# Patient Record
Sex: MALE | Race: BLACK OR AFRICAN AMERICAN
[De-identification: names, ages, dates, MRNs, and addresses within clinical notes are randomized per-mention and may not be internally consistent; named-entity substitution may affect disease eponyms.]

---

## 2021-07-10 ENCOUNTER — HOSPITAL ENCOUNTER (INPATIENT)
Dept: HOSPITAL 5 - ED | Age: 81
LOS: 10 days | Discharge: HOME | DRG: 673 | End: 2021-07-20
Attending: INTERNAL MEDICINE | Admitting: INTERNAL MEDICINE
Payer: MEDICARE

## 2021-07-10 DIAGNOSIS — E87.2: ICD-10-CM

## 2021-07-10 DIAGNOSIS — E87.5: ICD-10-CM

## 2021-07-10 DIAGNOSIS — Z79.01: ICD-10-CM

## 2021-07-10 DIAGNOSIS — S36.119A: ICD-10-CM

## 2021-07-10 DIAGNOSIS — Z79.82: ICD-10-CM

## 2021-07-10 DIAGNOSIS — E11.22: ICD-10-CM

## 2021-07-10 DIAGNOSIS — E43: ICD-10-CM

## 2021-07-10 DIAGNOSIS — D69.6: ICD-10-CM

## 2021-07-10 DIAGNOSIS — K92.2: ICD-10-CM

## 2021-07-10 DIAGNOSIS — Z87.891: ICD-10-CM

## 2021-07-10 DIAGNOSIS — J81.1: ICD-10-CM

## 2021-07-10 DIAGNOSIS — Z79.899: ICD-10-CM

## 2021-07-10 DIAGNOSIS — Z20.822: ICD-10-CM

## 2021-07-10 DIAGNOSIS — I71.6: ICD-10-CM

## 2021-07-10 DIAGNOSIS — G93.41: ICD-10-CM
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
## 2021-07-10 DIAGNOSIS — R74.8: ICD-10-CM

## 2021-07-10 DIAGNOSIS — N18.6: ICD-10-CM

## 2021-07-10 DIAGNOSIS — D50.9: ICD-10-CM

## 2021-07-10 DIAGNOSIS — R80.9: ICD-10-CM

## 2021-07-10 DIAGNOSIS — I50.9: ICD-10-CM

## 2021-07-10 DIAGNOSIS — I13.2: ICD-10-CM
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
## 2021-07-10 DIAGNOSIS — E87.6: ICD-10-CM

## 2021-07-10 DIAGNOSIS — E83.42: ICD-10-CM

## 2021-07-10 DIAGNOSIS — E11.9: ICD-10-CM

## 2021-07-10 DIAGNOSIS — N17.8: Primary | ICD-10-CM

## 2021-07-10 DIAGNOSIS — E87.70: ICD-10-CM

## 2021-07-10 DIAGNOSIS — Z79.891: ICD-10-CM

## 2021-07-10 DIAGNOSIS — R94.31: ICD-10-CM

## 2021-07-10 DIAGNOSIS — R74.01: ICD-10-CM
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
## 2021-07-10 DIAGNOSIS — I16.0: ICD-10-CM
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
## 2021-07-10 LAB
HCT VFR BLD CALC: 26.1 % (ref 35.5–45.6)
HGB BLD-MCNC: 8.5 GM/DL (ref 11.8–15.2)
MCHC RBC AUTO-ENTMCNC: 33 % (ref 32–34)
RBC # BLD AUTO: 4.01 M/MM3 (ref 3.65–5.03)

## 2021-07-10 PROCEDURE — 86850 RBC ANTIBODY SCREEN: CPT

## 2021-07-10 PROCEDURE — C9113 INJ PANTOPRAZOLE SODIUM, VIA: HCPCS

## 2021-07-10 PROCEDURE — C1769 GUIDE WIRE: HCPCS

## 2021-07-10 PROCEDURE — 74176 CT ABD & PELVIS W/O CONTRAST: CPT

## 2021-07-10 PROCEDURE — 36558 INSERT TUNNELED CV CATH: CPT

## 2021-07-10 PROCEDURE — 86900 BLOOD TYPING SEROLOGIC ABO: CPT

## 2021-07-10 PROCEDURE — 83735 ASSAY OF MAGNESIUM: CPT

## 2021-07-10 PROCEDURE — 85025 COMPLETE CBC W/AUTO DIFF WBC: CPT

## 2021-07-10 PROCEDURE — 71250 CT THORAX DX C-: CPT

## 2021-07-10 PROCEDURE — 94644 CONT INHLJ TX 1ST HOUR: CPT

## 2021-07-10 PROCEDURE — 93306 TTE W/DOPPLER COMPLETE: CPT

## 2021-07-10 PROCEDURE — 80053 COMPREHEN METABOLIC PANEL: CPT

## 2021-07-10 PROCEDURE — 84166 PROTEIN E-PHORESIS/URINE/CSF: CPT

## 2021-07-10 PROCEDURE — 85014 HEMATOCRIT: CPT

## 2021-07-10 PROCEDURE — 96365 THER/PROPH/DIAG IV INF INIT: CPT

## 2021-07-10 PROCEDURE — 86021 WBC ANTIBODY IDENTIFICATION: CPT

## 2021-07-10 PROCEDURE — 96375 TX/PRO/DX INJ NEW DRUG ADDON: CPT

## 2021-07-10 PROCEDURE — 93005 ELECTROCARDIOGRAM TRACING: CPT

## 2021-07-10 PROCEDURE — 86901 BLOOD TYPING SEROLOGIC RH(D): CPT

## 2021-07-10 PROCEDURE — 84156 ASSAY OF PROTEIN URINE: CPT

## 2021-07-10 PROCEDURE — 85027 COMPLETE CBC AUTOMATED: CPT

## 2021-07-10 PROCEDURE — 83520 IMMUNOASSAY QUANT NOS NONAB: CPT

## 2021-07-10 PROCEDURE — 94640 AIRWAY INHALATION TREATMENT: CPT

## 2021-07-10 PROCEDURE — U0003 INFECTIOUS AGENT DETECTION BY NUCLEIC ACID (DNA OR RNA); SEVERE ACUTE RESPIRATORY SYNDROME CORONAVIRUS 2 (SARS-COV-2) (CORONAVIRUS DISEASE [COVID-19]), AMPLIFIED PROBE TECHNIQUE, MAKING USE OF HIGH THROUGHPUT TECHNOLOGIES AS DESCRIBED BY CMS-2020-01-R: HCPCS

## 2021-07-10 PROCEDURE — 84300 ASSAY OF URINE SODIUM: CPT

## 2021-07-10 PROCEDURE — 84100 ASSAY OF PHOSPHORUS: CPT

## 2021-07-10 PROCEDURE — 85730 THROMBOPLASTIN TIME PARTIAL: CPT

## 2021-07-10 PROCEDURE — 83970 ASSAY OF PARATHORMONE: CPT

## 2021-07-10 PROCEDURE — 77001 FLUOROGUIDE FOR VEIN DEVICE: CPT

## 2021-07-10 PROCEDURE — A9502 TC99M TETROFOSMIN: HCPCS

## 2021-07-10 PROCEDURE — 76700 US EXAM ABDOM COMPLETE: CPT

## 2021-07-10 PROCEDURE — P9016 RBC LEUKOCYTES REDUCED: HCPCS

## 2021-07-10 PROCEDURE — 80048 BASIC METABOLIC PNL TOTAL CA: CPT

## 2021-07-10 PROCEDURE — 82570 ASSAY OF URINE CREATININE: CPT

## 2021-07-10 PROCEDURE — 78452 HT MUSCLE IMAGE SPECT MULT: CPT

## 2021-07-10 PROCEDURE — 83036 HEMOGLOBIN GLYCOSYLATED A1C: CPT

## 2021-07-10 PROCEDURE — 36600 WITHDRAWAL OF ARTERIAL BLOOD: CPT

## 2021-07-10 PROCEDURE — 71045 X-RAY EXAM CHEST 1 VIEW: CPT

## 2021-07-10 PROCEDURE — 36415 COLL VENOUS BLD VENIPUNCTURE: CPT

## 2021-07-10 PROCEDURE — 83690 ASSAY OF LIPASE: CPT

## 2021-07-10 PROCEDURE — 82803 BLOOD GASES ANY COMBINATION: CPT

## 2021-07-10 PROCEDURE — 86160 COMPLEMENT ANTIGEN: CPT

## 2021-07-10 PROCEDURE — 36430 TRANSFUSION BLD/BLD COMPNT: CPT

## 2021-07-10 PROCEDURE — 93017 CV STRESS TEST TRACING ONLY: CPT

## 2021-07-10 PROCEDURE — 85610 PROTHROMBIN TIME: CPT

## 2021-07-10 PROCEDURE — 81001 URINALYSIS AUTO W/SCOPE: CPT

## 2021-07-10 PROCEDURE — 86920 COMPATIBILITY TEST SPIN: CPT

## 2021-07-10 PROCEDURE — 85007 BL SMEAR W/DIFF WBC COUNT: CPT

## 2021-07-10 PROCEDURE — C1750 CATH, HEMODIALYSIS,LONG-TERM: HCPCS

## 2021-07-10 PROCEDURE — 84165 PROTEIN E-PHORESIS SERUM: CPT

## 2021-07-10 PROCEDURE — 85018 HEMOGLOBIN: CPT

## 2021-07-10 PROCEDURE — 82962 GLUCOSE BLOOD TEST: CPT

## 2021-07-10 PROCEDURE — 82270 OCCULT BLOOD FECES: CPT

## 2021-07-10 PROCEDURE — 84132 ASSAY OF SERUM POTASSIUM: CPT

## 2021-07-10 PROCEDURE — 86038 ANTINUCLEAR ANTIBODIES: CPT

## 2021-07-10 PROCEDURE — 80074 ACUTE HEPATITIS PANEL: CPT

## 2021-07-10 NOTE — EMERGENCY DEPARTMENT REPORT
ED Abdominal Pain HPI





- General


Chief Complaint: Abdominal Pain


Stated Complaint: STOMACH PAIN/DARK STOOL/NO APPETITE


Time Seen by Provider: 07/10/21 22:52


Source: patient, family


Mode of arrival: Ambulatory


Limitations: Language Barrier





- History of Present Illness


Initial Comments: 





Chief complaint: Abdominal pain, dark stools, nausea, decreased appetite x2 

weeks.





HPI: This is an 80-year-old male with history of diabetes mellitus, 

hypertension, aortic dissection status post surgical repair





Granddaughter at the bedside provided language interpretation.  Patient has had 

persistent abdominal pain.  He also has had significant weight loss.  He denies 

hematemesis.  Dark stools.  Poor appetite.  Last colonoscopy was over 15 years 

ago when he lived in [place].





Patient is followed by [facility].  Has been followed by [facility] 

since diagnosed with aortic disease.  PCP Dr. Harsha Griffiths.  Patient takes aspirin 

daily


MD Complaint: abdominal pain


-: Gradual, week(s) (2 weeks)


Location: diffuse


Radiation: none


Severity: moderate


Severity scale (0 -10): 7


Quality: cramping, aching, fullness


Consistency: constant


Improves With: nothing


Worsens With: nothing


Associated Symptoms: nausea, anorexia, other (Dark stools poor appetite weight 

loss)





- Related Data


                                Home Medications











 Medication  Instructions  Recorded  Confirmed  Last Taken


 


Amlodipine Besylate [Norvasc] 10 mg PO QDAY 07/10/21 07/10/21 Unknown


 


Aspirin EC [Halfprin EC] 81 mg PO QDAY 07/10/21 07/10/21 Unknown


 


Atorvastatin Calcium [Lipitor] 80 mg PO QDAY 07/10/21 07/10/21 Unknown


 


Lisinopril [Zestril] 10 mg PO QDAY 07/10/21 07/10/21 Unknown











                                    Allergies











Allergy/AdvReac Type Severity Reaction Status Date / Time


 


No Known Allergies Allergy   Unverified 01/17/15 01:13














ED Review of Systems


ROS: 


Stated complaint: STOMACH PAIN/DARK STOOL/NO APPETITE


Other details as noted in HPI





Comment: All other systems reviewed and negative


Constitutional: malaise.  denies: chills, fever


Respiratory: denies: cough, shortness of breath


Gastrointestinal: abdominal pain, nausea, other (Dark stools).  denies: 

vomiting, diarrhea, constipation


Skin: denies: rash, lesions





ED Past Medical Hx





- Past Medical History


Previous Medical History?: Yes


Hx Hypertension: Yes


Hx Diabetes: Yes


Additional medical history: Aortic dissection





- Surgical History


Past Surgical History?: Yes


Additional Surgical History: Aortic dissection repair





- Family History


Family history: other (Family history noncontributory at this time)





- Social History


Smoking Status: Former Smoker


Substance Use Type: None





- Medications


Home Medications: 


                                Home Medications











 Medication  Instructions  Recorded  Confirmed  Last Taken  Type


 


Amlodipine Besylate [Norvasc] 10 mg PO QDAY 07/10/21 07/10/21 Unknown History


 


Aspirin EC [Halfprin EC] 81 mg PO QDAY 07/10/21 07/10/21 Unknown History


 


Atorvastatin Calcium [Lipitor] 80 mg PO QDAY 07/10/21 07/10/21 Unknown History


 


Lisinopril [Zestril] 10 mg PO QDAY 07/10/21 07/10/21 Unknown History














ED Physical Exam





- General


Limitations: Language Barrier


General appearance: alert, in no apparent distress, other (very baggy clothing 

nontoxic but appears frail chronically ill)





- Head


Head exam: Present: atraumatic, normocephalic





- Eye


Eye exam: Present: normal appearance





- ENT


ENT exam: Present: mucous membranes moist





- Neck


Neck exam: Present: normal inspection, full ROM





- Respiratory


Respiratory exam: Present: normal lung sounds bilaterally.  Absent: respiratory 

distress, wheezes, rales, rhonchi





- Cardiovascular


Cardiovascular Exam: Present: normal rhythm, tachycardia, normal heart sounds.  

Absent: systolic murmur, diastolic murmur, rubs, gallop





- GI/Abdominal


GI/Abdominal exam: Present: soft, normal bowel sounds.  Absent: distended, 

tenderness, guarding, rebound





- Rectal


Rectal exam: Present: normal rectal tone, heme (-) stool, other (Brown stool 

Hemoccult negative no gross blood)





- Extremities Exam


Extremities exam: Present: normal inspection





- Neurological Exam


Neurological exam: Present: alert, oriented X3





- Psychiatric


Psychiatric exam: Present: normal affect, normal mood





- Skin


Skin exam: Present: warm, dry, intact, normal color.  Absent: rash





ED Course


                                   Vital Signs











  07/10/21 07/10/21 07/10/21





  22:22 23:22 23:23


 


Temperature 97.7 F  


 


Pulse Rate 109 H  103 H


 


Pulse Rate [   





Bilateral]   


 


Respiratory 16  40 H





Rate   


 


Respiratory   





Rate [Bilateral   





]   


 


Blood Pressure 198/137  


 


Blood Pressure   215/147





[Left]   


 


O2 Sat by Pulse 96 95 93





Oximetry   














  07/10/21 07/10/21 07/11/21





  23:30 23:45 00:11


 


Temperature   


 


Pulse Rate 104 H 103 H 74


 


Pulse Rate [   





Bilateral]   


 


Respiratory 32 H  31 H





Rate   


 


Respiratory   





Rate [Bilateral   





]   


 


Blood Pressure 215/147 207/143 


 


Blood Pressure   





[Left]   


 


O2 Sat by Pulse 98  97





Oximetry   














  21





  00:31 01:31 02:01


 


Temperature   


 


Pulse Rate 77 80 88


 


Pulse Rate [   





Bilateral]   


 


Respiratory 31 H 32 H 32 H





Rate   


 


Respiratory   





Rate [Bilateral   





]   


 


Blood Pressure 172/118 160/115 169/121


 


Blood Pressure   





[Left]   


 


O2 Sat by Pulse 99 99 100





Oximetry   














  21





  02:31 02:36 02:38


 


Temperature   


 


Pulse Rate 84  


 


Pulse Rate [   86





Bilateral]   


 


Respiratory 29 H  





Rate   


 


Respiratory   26 H





Rate [Bilateral   





]   


 


Blood Pressure 166/124  


 


Blood Pressure   





[Left]   


 


O2 Sat by Pulse 100 100 





Oximetry   














  21





  03:31 03:41 03:57


 


Temperature   


 


Pulse Rate 85 86 91 H


 


Pulse Rate [   





Bilateral]   


 


Respiratory 29 H 25 H 37 H





Rate   


 


Respiratory   





Rate [Bilateral   





]   


 


Blood Pressure 188/125 181/123 


 


Blood Pressure   





[Left]   


 


O2 Sat by Pulse 99 99 96





Oximetry   














ED Medical Decision Making





- Lab Data


Result diagrams: 


                                 21 08:23





                                 21 08:23





- Radiology Data


Radiology results: report reviewed





[place] [address]  


 


                                          Cat Scan Report   


                                         Signed with Harini  


 


Patient: LORETTA MOORE                                                            

    MR#: [id number]   


 


: 1940                                                                

Acct:[id number]      


 


Age/Sex: 80 / M                                                                

ADM Date: 07/10/21     


 


Loc: ED       


Attending Dr:   


 


 


Ordering Physician: Maggy Nick MD  


Date of Service: 21  


Procedure(s): CT abdomen pelvis wo con  


Accession Number(s): [id number]  


 


cc: Maggy Nick MD   


 


 


 


                                            **ADDENDUM**  


Addendum: Impression should also include "findings of CHF and pulmonary edema 

with moderate right 


and trace left pleural effusions."  


 


 Signer Name: Hardeep Wong MD   


 Signed: 2021 2:29 AM  


 Workstation Name: [employer]-   


 


 


Addendum Transcribed By: ELEANOR  


Addendum Dictated By: HARDEEP WONG MD                                    

                


Addendum Electronically Authenticated By: HARDEEP WONG MD  


Addendum Signed Date/Time: 21                                        

            


 


 


DD/DT:                                                 


TD/TT: /                                                            


CT ABDOMEN AND PELVIS WITHOUT CONTRAST  


 


 INDICATION / CLINICAL INFORMATION: Abdominal pain dark stools cachexia poor 

appetite.  


 


 TECHNIQUE: Axial CT images were obtained through the abdomen and pelvis without

 IV contrast.  All 


CT scans at this location are performed using CT dose reduction for ALARA by 

means of automated 


exposure control.   


 


 COMPARISON: CT from 2019.  


 


 FINDINGS:  


 


 Cardiomegaly with interlobular septal thickening in the lung bases, likely 

reflecting edema. 


Moderate right and trace left pleural effusions. 5 cm thoracoabdominal aneurysm 

with chronic disse


ction extending to the right iliac artery, unchanged in appearance from CT from 

2019.  


 


 Liver, gallbladder, pancreas, spleen, and adrenals demonstrate no acute ab

normality. There are 


bilateral renal cysts. Mild bilateral renal atrophy. No hydronephrosis. Bladder 

is decompressed with


though grossly unremarkable.  


 


 No evidence of bowel obstruction or inflammation. No free fluid or adenopathy. 

No acute osseous 


findings.  


 


 IMPRESSION:  


 


 1. Similar findings of chronic aortic dissection and prominent aneurysmal 

dilatation of the 


thoracic and abdominal aorta. No evidence of rupture.  


 2. No acute abnormality of the abdomen or pelvis. No bowel obstruction or 

inflammation.  


 3. Other stable chronic and incidental findings as above.  


 


 Signer Name: Hardeep Wong MD   


 Signed: 2021 1:29 AM  


 Workstation Name: [id number]   


 


 


Transcribed By: ELEANOR  


Dictated By: HARDEEP WONG MD  


Electronically Authenticated By: HARDEEP WONG MD    


Signed Date/Time: 21                                


 


 


 


DD/DT: 21                                                            

  


TD/TT:


--


[Addendum Report Added by HARDEEP WONG at 2021 02:35:26]





[facility]  


                                     [address] [place] [address]  


 


                                          Cat Scan Report   


                                               Signed  


 


Patient: LORETTA MOORE                                                            

   MR#: [id number]   


 


: 1940                                                                

Acct:[id number]      


 


Age/Sex: 80 / M                                                                

ADM Date: 07/10/21     


 


Loc: ED       


Attending Dr:   


 


 


Ordering Physician: Maggy Nick MD  


Date of Service: 21  


Procedure(s): CT abdomen pelvis wo con  


Accession Number(s): [id number]  


 


cc: Maggy Nick MD   


 


 


CT ABDOMEN AND PELVIS WITHOUT CONTRAST  


 


 INDICATION / CLINICAL INFORMATION: Abdominal pain dark stools cachexia poor 

appetite.  


 


 TECHNIQUE: Axial CT images were obtained through the abdomen and pelvis without

IV contrast.  All 


CT scans at this location are performed using CT dose reduction for ALARA by 

means of automated 


exposure control.   


 


 COMPARISON: CT from 2019.  


 


 FINDINGS:  


 


 Cardiomegaly with interlobular septal thickening in the lung bases, likely 

reflecting edema. 


Moderate right and trace left pleural effusions. 5 cm thoracoabdominal aneurysm 

with chronic disse


ction extending to the right iliac artery, unchanged in appearance from CT from 

2019.  


 


 Liver, gallbladder, pancreas, spleen, and adrenals demonstrate no acute 

abnormality. There are 


bilateral renal cysts. Mild bilateral renal atrophy. No hydronephrosis. Bladder 

is decompressed with


though grossly unremarkable.  


 


 No evidence of bowel obstruction or inflammation. No free fluid or adenopathy. 

No acute osseous 


findings.  


 


 IMPRESSION:  


 


 1. Similar findings of chronic aortic dissection and prominent aneurysmal 

dilatation of the 


thoracic and abdominal aorta. No evidence of rupture.  


 2. No acute abnormality of the abdomen or pelvis. No bowel obstruction or 

inflammation.  


 3. Other stable chronic and incidental findings as above.  


 


 Signer Name: Hardeep Wong MD   


 Signed: 2021 1:29 AM  


 Workstation Name: [id number]   


 


 


Transcribed By: JS  


Dictated By: HARDEEP WONG MD  


Electronically Authenticated By: HARDEEP WONG MD    


Signed Date/Time: 21                                


 


 


 


DD/DT: 21                                                            

 


TD/TT:
































- Medical Decision Making





1.  Acute kidney injury: Patient has history of chronic kidney disease, unclear 

if due to hypertension or vasomotor nephropathy due to poor p.o. intake.  No 

evidence of GUobstruction on CT scan.  Hyperkalemia and metabolic acidosis 

addressed with calcium gluconate, sodium bicarbonate insulin with dextrose.





2.  Severe anemia previous hemoglobin 12.4, hemoglobin today 7.5., possible 

anemia of chronic disease with progressive kidney disease.  Patient had normal 

hemoglobin hematocrit with microcytosis in .  Patient has not seen a PCP in 

over a year prior to pandemic.  Patient did report dark stools.  He does take 

aspirin.  Possibly subacute upper GI bleed.





3.  Hypertensive urgency treated with IV labetalol initially.





4. Abdominal pain: Differential diagnosis includes peptic ulcer disease, bowel 

obstruction, malignancy, biliary disease,





Patient is admitted to the hospital service in fair condition telemetry floor





[facility]  


                                     [address] [place] [address]  


 


                                          Cat Scan Report   


                                         Signed with Addenda  


 


Patient: LORETTA MOORE                                                            

   MR#: [id number]   


 


: 1940                                                                

Acct:[id number]      


 


Age/Sex: 80 / M                                                                

ADM Date: 07/10/21     


 


Loc: ED       


Attending Dr:   


 


 


Ordering Physician: Maggy Nick MD  


Date of Service: 21  


Procedure(s): CT abdomen pelvis wo con  


Accession Number(s): [id number]  


 


cc: Maggy Nick MD   


 


 


 


                                            **ADDENDUM**  


Addendum: Impression should also include "findings of CHF and pulmonary edema 

with moderate right 


and trace left pleural effusions."  


 


 Signer Name: Hardeep Wong MD   


 Signed: 2021 2:29 AM  


 Workstation Name: [id number]   


 


 


Addendum Transcribed By: ELEANOR  


Addendum Dictated By: HARDEEP WONG MD                                    

               


Addendum Electronically Authenticated By: HARDEEP WONG MD  


Addendum Signed Date/Time: 21                                        

           


 


 


DD/DT:                                                 


TD/TT: /                                                            


CT ABDOMEN AND PELVIS WITHOUT CONTRAST  


 


 INDICATION / CLINICAL INFORMATION: Abdominal pain dark stools cachexia poor 

appetite.  


 


 TECHNIQUE: Axial CT images were obtained through the abdomen and pelvis without

IV contrast.  All 


CT scans at this location are performed using CT dose reduction for [facility] by 

means of automated 


exposure control.   


 


 COMPARISON: CT from 2019.  


 


 FINDINGS:  


 


 Cardiomegaly with interlobular septal thickening in the lung bases, likely 

reflecting edema. 


Moderate right and trace left pleural effusions. 5 cm thoracoabdominal aneurysm 

with chronic disse


ction extending to the right iliac artery, unchanged in appearance from CT from 

2019.  


 


 Liver, gallbladder, pancreas, spleen, and adrenals demonstrate no acute 

abnormality. There are 


bilateral renal cysts. Mild bilateral renal atrophy. No hydronephrosis. Bladder 

is decompressed with


though grossly unremarkable.  


 


 No evidence of bowel obstruction or inflammation. No free fluid or adenopathy. 

No acute osseous 


findings.  


 


 IMPRESSION:  


 


 1. Similar findings of chronic aortic dissection and prominent aneurysmal 

dilatation of the 


thoracic and abdominal aorta. No evidence of rupture.  


 2. No acute abnormality of the abdomen or pelvis. No bowel obstruction or 

inflammation.  


 3. Other stable chronic and incidental findings as above.  


 


 Signer Name: Hardeep Wong MD   


 Signed: 2021 1:29 AM  


 Workstation Name: [employer]-   


 


 


Transcribed By: JS  


Dictated By: HARDEEP WONG MD  


Electronically Authenticated By: HARDEEP WONG MD    


Signed Date/Time: 21                                


 


 


 


DD/DT: 21                                                            

 


TD/TT:


--


[Addendum Report Added by HARDEEP WONG at 2021 02:35:26]





[place] [address]  


 


                                          Cat Scan Report   


                                               Signed  


 


Patient: LORETTA MOORE                                                            

   MR#: [id number]   


 


: 1940                                                                

Acct:[id number]      


 


Age/Sex: 80 / M                                                                

ADM Date: 07/10/21     


 


Loc: ED       


Attending Dr:   


 


 


Ordering Physician: Maggy Nick MD  


Date of Service: 21  


Procedure(s): CT abdomen pelvis wo con  


Accession Number(s): [id number]  


 


cc: Maggy Nick MD   


 


 


CT ABDOMEN AND PELVIS WITHOUT CONTRAST  


 


 INDICATION / CLINICAL INFORMATION: Abdominal pain dark stools cachexia poor 

appetite.  


 


 TECHNIQUE: Axial CT images were obtained through the abdomen and pelvis without

IV contrast.  All 


CT scans at this location are performed using CT dose reduction for ALARA by 

means of automated 


exposure control.   


 


 COMPARISON: CT from 2019.  


 


 FINDINGS:  


 


 Cardiomegaly with interlobular septal thickening in the lung bases, likely 

reflecting edema. 


Moderate right and trace left pleural effusions. 5 cm thoracoabdominal aneurysm 

with chronic disse


ction extending to the right iliac artery, unchanged in appearance from CT from 

2019.  


 


 Liver, gallbladder, pancreas, spleen, and adrenals demonstrate no acute 

abnormality. There are 


bilateral renal cysts. Mild bilateral renal atrophy. No hydronephrosis. Bladder 

is decompressed with


though grossly unremarkable.  


 


 No evidence of bowel obstruction or inflammation. No free fluid or adenopathy. 

No acute osseous 


findings.  


 


 IMPRESSION:  


 


 1. Similar findings of chronic aortic dissection and prominent aneurysmal 

dilatation of the 


thoracic and abdominal aorta. No evidence of rupture.  


 2. No acute abnormality of the abdomen or pelvis. No bowel obstruction or 

inflammation.  


 3. Other stable chronic and incidental findings as above.  


 


 Signer Name: Hardeep Wong MD   


 Signed: 2021 1:29 AM  


 Workstation Name: [email or web address]   


 


 


Transcribed By: JS  


Dictated By: HARDEEP WONG MD  


Electronically Authenticated By: HARDEEP WONG MD    


Signed Date/Time: 21                                


 


 


 


DD/DT: 21                                                            

 


TD/TT:





























Critical care attestation.: 


If time is entered above; I have spent that time in minutes in the direct care 

of this critically ill patient, excluding procedure time.








ED Disposition


Clinical Impression: 


 Acute kidney injury superimposed on chronic kidney disease, Hypertensive 

urgency, Anemia





Disposition:  OP ADMIT IP TO THIS HOSP


Is pt being admited?: Yes


Does the pt Need Aspirin: No


Condition: Stable

## 2021-07-11 LAB
ALBUMIN SERPL-MCNC: 3.8 G/DL (ref 3.9–5)
ALBUMIN SERPL-MCNC: 3.8 G/DL (ref 3.9–5)
ALT SERPL-CCNC: 448 UNITS/L (ref 7–56)
ALT SERPL-CCNC: 491 UNITS/L (ref 7–56)
ANISOCYTOSIS BLD QL SMEAR: (no result)
BAND NEUTROPHILS # (MANUAL): 0 K/MM3
BAND NEUTROPHILS # (MANUAL): 0.1 K/MM3
BILIRUB UR QL STRIP: (no result)
BLOOD UR QL VISUAL: (no result)
BUN SERPL-MCNC: 72 MG/DL (ref 9–20)
BUN SERPL-MCNC: 72 MG/DL (ref 9–20)
BUN SERPL-MCNC: 77 MG/DL (ref 9–20)
BUN/CREAT SERPL: 11 %
CALCIUM SERPL-MCNC: 8.6 MG/DL (ref 8.4–10.2)
CALCIUM SERPL-MCNC: 8.7 MG/DL (ref 8.4–10.2)
CALCIUM SERPL-MCNC: 9.2 MG/DL (ref 8.4–10.2)
CREATININE,URINE: 78.5 MG/DL (ref 0.1–20)
HCO3 BLDA-SCNC: 18.8 MMOL/L (ref 20–26)
HCT VFR BLD CALC: 24.9 % (ref 35.5–45.6)
HEMOLYSIS INDEX: 1
HEMOLYSIS INDEX: 11
HEMOLYSIS INDEX: 69
HGB BLD-MCNC: 7.8 GM/DL (ref 11.8–15.2)
MCHC RBC AUTO-ENTMCNC: 32 % (ref 32–34)
MCV RBC AUTO: 65 FL (ref 84–94)
MCV RBC AUTO: 66 FL (ref 84–94)
MUCOUS THREADS #/AREA URNS HPF: (no result) /HPF
MYELOCYTES # (MANUAL): 0 K/MM3
MYELOCYTES # (MANUAL): 0 K/MM3
PCO2 BLDA: 32.3 MM HG
PH BLDA: 7.38 PH UNITS (ref 7.35–7.45)
PH UR STRIP: 5 [PH] (ref 5–7)
PLATELET # BLD: 151 K/MM3 (ref 140–440)
PLATELET # BLD: 98 K/MM3 (ref 140–440)
PO2 BLDA: 107.8 MM HG (ref 80–90)
PROMYELOCYTES # (MANUAL): 0 K/MM3
PROMYELOCYTES # (MANUAL): 0 K/MM3
PROT UR STRIP-MCNC: >500 MG/DL
PROTEIN/CREATININE RATIO,URINE: 9.49
RBC # BLD AUTO: 3.8 M/MM3 (ref 3.65–5.03)
RBC #/AREA URNS HPF: 10 /HPF (ref 0–6)
TOTAL CELLS COUNTED BLD: 100
TOTAL CELLS COUNTED BLD: 100
UROBILINOGEN UR-MCNC: < 2 MG/DL (ref ?–2)
WBC #/AREA URNS HPF: 4 /HPF (ref 0–6)

## 2021-07-11 PROCEDURE — 4A033R1 MEASUREMENT OF ARTERIAL SATURATION, PERIPHERAL, PERCUTANEOUS APPROACH: ICD-10-PCS | Performed by: INTERNAL MEDICINE

## 2021-07-11 RX ADMIN — INSULIN HUMAN SCH UNITS: 100 INJECTION, SOLUTION PARENTERAL at 17:15

## 2021-07-11 RX ADMIN — IPRATROPIUM BROMIDE AND ALBUTEROL SULFATE SCH AMPUL: .5; 3 SOLUTION RESPIRATORY (INHALATION) at 19:07

## 2021-07-11 RX ADMIN — INSULIN HUMAN SCH: 100 INJECTION, SOLUTION PARENTERAL at 11:48

## 2021-07-11 RX ADMIN — Medication SCH ML: at 10:10

## 2021-07-11 RX ADMIN — Medication SCH ML: at 22:38

## 2021-07-11 RX ADMIN — IPRATROPIUM BROMIDE AND ALBUTEROL SULFATE SCH AMPUL: .5; 3 SOLUTION RESPIRATORY (INHALATION) at 13:21

## 2021-07-11 RX ADMIN — INSULIN HUMAN SCH: 100 INJECTION, SOLUTION PARENTERAL at 22:38

## 2021-07-11 RX ADMIN — IPRATROPIUM BROMIDE AND ALBUTEROL SULFATE SCH AMPUL: .5; 3 SOLUTION RESPIRATORY (INHALATION) at 08:11

## 2021-07-11 RX ADMIN — PANTOPRAZOLE SODIUM SCH MG: 40 INJECTION, POWDER, FOR SOLUTION INTRAVENOUS at 22:38

## 2021-07-11 RX ADMIN — INSULIN HUMAN SCH: 100 INJECTION, SOLUTION PARENTERAL at 09:10

## 2021-07-11 RX ADMIN — PANTOPRAZOLE SODIUM SCH MG: 40 INJECTION, POWDER, FOR SOLUTION INTRAVENOUS at 10:10

## 2021-07-11 NOTE — CONSULTATION
History of Present Illness





- Reason for Consult


Consult date: 07/11/21


acute renal failure, chronic renal failure, hyperkalemia, metabolic acidosis





- History of Present Illness


The patient is a 79 YO [place] male with known history of Hypertension, 

Diabetes mellitus type 2, chronic thoraco-abdominal aortic dissection and CKD 

who presented to [facility] emergency room 7/10 with complaining of nausea, decreased 

appetite, abdominal pain and dark stool which has been ongoing for the past 2 

weeks.  Most of the history was gotten from the granddaughter(POA) over the 

phone due to language barrier.  Per grand daughter patient's health has been 

declining for the past few months and he hasn't aaron any physician in more than

2 yrs.  Also report of significant weight loss, nausea and poor PO intake.  No 

h/o hematemesis, hematuria, dysuria, constipation. leg swelling, sob, cough, 

dizziness or syncope.  No h/o NSAID use apart from aspirin.  Work-up in the ED, 

Hb 8.5, hematocrit of 26.1, K 5.9, BUN 72, creatinine 6.8,  and . 

CT of the abdomen and pelvis reveals cardiomegaly with interlobular septal 

thickening in the lung bases likely reflecting edema, moderate right and trace 

left pleural effusions, 5 cm thoraco-abdominal aneurysm with chronic dissection 

extending to the right iliac artery unchanged from previous CT in 2019.  Patient

was admitted with EDE on CKD, hyperkalemia, abnormal liver enzymes, anemia and 

possible GI bleed.  Nephrology was consulted for further evaluation and 

treatment of EDE.





Past History


Past Medical History: diabetes, hypertension, hyperlipidemia, renal failure


Past Surgical History: Other (Aortic dissection repair, colonoscopy over 15 

years ago)


Social history: smoking (Former smoker).  denies: alcohol abuse


Family history: no significant family history





Medications and Allergies


                                    Allergies











Allergy/AdvReac Type Severity Reaction Status Date / Time


 


No Known Allergies Allergy   Unverified 01/17/15 01:13











                                Home Medications











 Medication  Instructions  Recorded  Confirmed  Last Taken  Type


 


Amlodipine Besylate [Norvasc] 10 mg PO QDAY 07/10/21 07/10/21 Unknown History


 


Aspirin EC [Halfprin EC] 81 mg PO QDAY 07/10/21 07/10/21 Unknown History


 


Atorvastatin Calcium [Lipitor] 80 mg PO QDAY 07/10/21 07/10/21 Unknown History


 


Lisinopril [Zestril] 10 mg PO QDAY 07/10/21 07/10/21 Unknown History











Active Meds: 


Active Medications





Acetaminophen (Acetaminophen 325 Mg Tab)  650 mg PO Q6H PRN


   PRN Reason: Pain MILD(1-3)/Fever >100.5/HA


Albuterol/Ipratropium (Ipratropium/Albuterol Sulfate 3 Ml Ampul.Neb)  1 ampul IH

 Q6HRT [facility]


   Last Admin: 07/11/21 08:11 Dose:  1 ampul


   Documented by: 


Dextrose (Dextrose 50% In Water (25gm) 50 Ml Syringe)  0 ml IV Q30MIN PRN; 

Protocol


   PRN Reason: Hypoglycemia


Hydralazine HCl (Hydralazine 20 Mg/1 Ml Inj)  10 mg IV Q4H PRN


   PRN Reason: Blood Pressure


   Last Admin: 07/11/21 10:10 Dose:  10 mg


   Documented by: 


Insulin Human Regular (Insulin Regular, Human 100 Units/1 Ml)  0 units SUB-Q AC

HS [facility]; Protocol


   Last Admin: 07/11/21 11:48 Dose:  Not Given


   Documented by: 


Magnesium Hydroxide (Magnesium Hydroxide (Mom) Oral Liqd Udc)  30 ml PO Q4H PRN


   PRN Reason: Constipation


Morphine Sulfate (Morphine 2 Mg/1 Ml Inj)  2 mg IV Q4H PRN


   PRN Reason: Pain, Moderate (4-6)


   Last Admin: 07/11/21 04:54 Dose:  2 mg


   Documented by: 


Morphine Sulfate (Morphine 4 Mg/1 Ml Inj)  4 mg IV Q4H PRN


   PRN Reason: Pain , Severe (7-10)


Ondansetron HCl (Ondansetron 4 Mg/2 Ml Inj)  4 mg IV Q8H PRN


   PRN Reason: Nausea And Vomiting


Pantoprazole Sodium (Pantoprazole 40 Mg Inj)  40 mg IV BID [facility]


   Last Admin: 07/11/21 10:10 Dose:  40 mg


   Documented by: 


Sodium Chloride (Sodium Chloride 0.9% 10 Ml Flush Syringe)  10 ml IV BID [facility]


   Last Admin: 07/11/21 10:10 Dose:  10 ml


   Documented by: 


Sodium Chloride (Sodium Chloride 0.9% 10 Ml Flush Syringe)  10 ml IV PRN PRN


   PRN Reason: LINE FLUSH











Review of Systems


ROS unobtainable: due to mental status (language barrier)





Exam





- Vital Signs


Vital signs: 


                                   Vital Signs











Temp Pulse Resp BP Pulse Ox


 


 97.7 F   109 H  16   198/137   96 


 


 07/10/21 22:22  07/10/21 22:22  07/10/21 22:22  07/10/21 22:22  07/10/21 22:22














Results





- Lab Results





                                 07/11/21 08:23





                                 07/11/21 08:23


                             Most recent lab results











Calcium  8.6 mg/dL (8.4-10.2)   07/11/21  08:23    














Assessment and Plan


1. Acute kidney injury superimposed on CKD:


EDE superimposed on CKD .


CT abdomen negative for hydro.


Urine studies ordered.


Monitor renal function.


Renal prognosis is guarded to poor.


Avoid nephrotoxic agents.


Meds dosage based on GFR.


Patient require hemodialysis due to significant decline in the GFR, associated 

hyperkalemia, metabolic acidosis and volume overload.


D/w his grand daughter over the phone, explained the indications, benefits, 

risks and alternatives involved in hemodialysis.


She is going to talk to the patient and other family members and let us know.


Also d/w  about hemodialysis catheter placement.





2. FEN:


Hyperkalemia, meds ordered, monitor.


Anion-gap Metabolic acidosis, IV Sod bicarb, monitor.


Volume overload, IV lasix.


Monitor lytes.





3. Decompensated CHF:


Echo EF 45-50%.


Strict I/O.


Started on Metoprolol.


Card consulted.





4. Elevated liver enzymes:


Hepatitis serologies negative.





5. GI bleed:


Per GI EGD in the next few days, after CHF/HTN/EDE has been assessed and 

improved.





6. Hypertensive urgency:


Started on Metoprolol and IV Lasix.


Monitor.





7. Hypochromic Anemia, POA:


?2/2 GI bleed.


Followed by GI.


Monitor.





8. Thrombocytopenia.





9. Chronic thoraco-abdominal aneurysm.











Subjective:


Patient was seen and examined at the bedside.











Examination:


General appearance: well-developed, thin built, appears stated age, not in 

distress


HEENT: ATNC, pupils equal


Neck: supple


Respiratory: bibasal rales heard


Cardiology: regular, S1S2, no murmur


Gastrointestinal: soft, bowel sounds heard, not tender


Integumentary: warm and dry, upper chest hypopigmented patches noted


Neurologic: alert, moving extremities


Ext: no edema

## 2021-07-11 NOTE — CONSULTATION
History of Present Illness


Consult date: 07/11/21


Requesting physician: WILMER VENTURA


Reason for consult: other (Critical care management)


History of present illness: 








80-year-old [place] male with known history of hypertension and diabetes 

mellitus, history of thoracic aortic dissection repair presents to the emergency

room today complaining of nausea, decreased appetite, abdominal pain and dark 

stool which has been ongoing for the past 2 weeks.


Most of the history was gotten from the granddaughter who was by the bedside 

because of the language barrier.





Abdominal pain is said to have been persistent over the past few weeks.  Patient

also reports significant weight loss.  He denies any hematemesis, no hematuria 

or dysuria, denies any constipation.


Patient denies any nonsteroidal anti-inflammatory medication use apart from 

aspirin.


Patient's last colonoscopy was over 15 years ago while living in [place].





Work-up in the emergency room today, significant findings were that of 

hemoglobin of 8.5 and hematocrit of 26.1, potassium was elevated at 5.9, BUN and

creatinine were elevated at 72 and 6.8, AST and ALT elevated at 517 and 448 

respectively.  Total bilirubin was 1.50





CT of the abdomen and pelvis reveals cardiomegaly with interlobular septal 

thickening in the lung bases likely reflecting edema.  Moderate right and trace 

left pleural effusions.  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 

2019.





Patient is being admitted with acute on chronic renal failure, hyperkalemia, 

abnormal liver enzymes, anemia possible GI bleed.





I have been consulted or critical care management.


Patient seen and examined.


Vitals, labs, medications, chart and imaging reviewed.








Past History


Past Medical History: diabetes, hypertension, hyperlipidemia


Past Surgical History: Other (Aortic dissection repair, colonoscopy over 15 

years ago)


Social history: smoking (Former smoker).  denies: alcohol abuse


Family history: no significant family history





Medications and Allergies


                                    Allergies











Allergy/AdvReac Type Severity Reaction Status Date / Time


 


No Known Allergies Allergy   Unverified 01/17/15 01:13











                                Home Medications











 Medication  Instructions  Recorded  Confirmed  Last Taken  Type


 


Amlodipine Besylate [Norvasc] 10 mg PO QDAY 07/10/21 07/10/21 Unknown History


 


Aspirin EC [Halfprin EC] 81 mg PO QDAY 07/10/21 07/10/21 Unknown History


 


Atorvastatin Calcium [Lipitor] 80 mg PO QDAY 07/10/21 07/10/21 Unknown History


 


Lisinopril [Zestril] 10 mg PO QDAY 07/10/21 07/10/21 Unknown History











Active Meds: 


Active Medications





Acetaminophen (Acetaminophen 325 Mg Tab)  650 mg PO Q6H PRN


   PRN Reason: Pain MILD(1-3)/Fever >100.5/HA


Albuterol/Ipratropium (Ipratropium/Albuterol Sulfate 3 Ml Ampul.Neb)  1 ampul IH

 Q6HRT [facility]


   Last Admin: 07/11/21 08:11 Dose:  1 ampul


   Documented by: 


Dextrose (Dextrose 50% In Water (25gm) 50 Ml Syringe)  0 ml IV Q30MIN PRN; 

Protocol


   PRN Reason: Hypoglycemia


Hydralazine HCl (Hydralazine 20 Mg/1 Ml Inj)  10 mg IV Q4H PRN


   PRN Reason: Blood Pressure


   Last Admin: 07/11/21 10:10 Dose:  10 mg


   Documented by: 


Insulin Human Regular (Insulin Regular, Human 100 Units/1 Ml)  0 units SUB-Q 

[facility]; Protocol


   Last Admin: 07/11/21 09:10 Dose:  Not Given


   Documented by: 


Magnesium Hydroxide (Magnesium Hydroxide (Mom) Oral Liqd Udc)  30 ml PO Q4H PRN


   PRN Reason: Constipation


Morphine Sulfate (Morphine 2 Mg/1 Ml Inj)  2 mg IV Q4H PRN


   PRN Reason: Pain, Moderate (4-6)


   Last Admin: 07/11/21 04:54 Dose:  2 mg


   Documented by: 


Morphine Sulfate (Morphine 4 Mg/1 Ml Inj)  4 mg IV Q4H PRN


   PRN Reason: Pain , Severe (7-10)


Ondansetron HCl (Ondansetron 4 Mg/2 Ml Inj)  4 mg IV Q8H PRN


   PRN Reason: Nausea And Vomiting


Pantoprazole Sodium (Pantoprazole 40 Mg Inj)  40 mg IV BID [facility]


   Last Admin: 07/11/21 10:10 Dose:  40 mg


   Documented by: 


Sodium Chloride (Sodium Chloride 0.9% 10 Ml Flush Syringe)  10 ml IV BID [facility]


   Last Admin: 07/11/21 10:10 Dose:  10 ml


   Documented by: 


Sodium Chloride (Sodium Chloride 0.9% 10 Ml Flush Syringe)  10 ml IV PRN PRN


   PRN Reason: LINE FLUSH











Review of Systems


ROS unobtainable: due to mental status (DUE to language barrier)





Physical Examination


Vital signs: 


                                   Vital Signs











Temp Pulse Resp BP Pulse Ox


 


 97.7 F   109 H  16   198/137   96 


 


 07/10/21 22:22  07/10/21 22:22  07/10/21 22:22  07/10/21 22:22  07/10/21 22:22








Vitals reviewed


General appearance: well-developed, thin built, appears stated age, not in 

distress


HEENT: ATNC, pupils equal


Neck: supple


Respiratory: bibasal rales heard


Cardiology: regular, S1S2, no murmur


Gastrointestinal: soft, bowel sounds heard, not tender


Integumentary: warm and dry, upper chest hypopigmented patches noted


Neurologic: alert, moving extremities


Ext: no edema





Results





- Laboratory Findings


CBC and BMP: 


                                 07/12/21 04:25





                                 07/12/21 04:25


Abnormal lab findings: 


                                  Abnormal Labs











  07/10/21 07/10/21 07/11/21





  22:43 22:43 04:28


 


Hgb  8.5 L  


 


Hct  26.1 L  


 


MCV  65 L  


 


MCH  21 L  


 


RDW  18.4 H  


 


Plt Count   


 


Seg Neuts % (Manual)  80.0 H  


 


Lymphocytes % (Manual)  13.0 L  


 


Lymphocytes # (Manual)  0.9 L  


 


Potassium   5.9 H  5.2 H


 


Carbon Dioxide   14 L  20 L


 


BUN   72 H  72 H


 


Creatinine   6.8 H  6.5 H


 


Glucose   152 H 


 


POC Glucose   


 


Total Bilirubin   1.50 H  1.40 H


 


AST   517 H  604 H


 


ALT   448 H  491 H


 


Total Protein    6.2 L


 


Albumin   3.8 L  3.8 L














  07/11/21 07/11/21 07/11/21





  04:32 08:23 08:23


 


Hgb   7.8 L 


 


Hct   24.9 L 


 


MCV   66 L 


 


MCH   21 L 


 


RDW   18.0 H 


 


Plt Count   98 L 


 


Seg Neuts % (Manual)   


 


Lymphocytes % (Manual)   


 


Lymphocytes # (Manual)   


 


Potassium    5.7 H


 


Carbon Dioxide    14 L


 


BUN    77 H


 


Creatinine    6.7 H


 


Glucose    126 H


 


POC Glucose  114 H  


 


Total Bilirubin   


 


AST   


 


ALT   


 


Total Protein   


 


Albumin   














  07/11/21





  08:58


 


Hgb 


 


Hct 


 


MCV 


 


MCH 


 


RDW 


 


Plt Count 


 


Seg Neuts % (Manual) 


 


Lymphocytes % (Manual) 


 


Lymphocytes # (Manual) 


 


Potassium 


 


Carbon Dioxide 


 


BUN 


 


Creatinine 


 


Glucose 


 


POC Glucose  139 H


 


Total Bilirubin 


 


AST 


 


ALT 


 


Total Protein 


 


Albumin 














Assessment and Plan


Pulmonary edema


Anemia-microcytic 


Acute kidney injury superimposed on chronic kidney disease


Hyperkalemia


Elevated liver enzymes


Hypertensive urgency


Protein calorie malnutrition











-Titrate supplemental oxygen to keep O2 sast 89-92%


-Medical management of hyperkalemia- will need HD. 


Await final recommendations from renal service, prior to placement of HD 

catheter


-Supportive transfusions as clinically indicated to keep HgB >7g/dL


-SCDs for VTE prophylaxis for now


-ABG to assess acid base balance and hypoxia


-Place Malave catheter


-IV prn antihypertensives for blood pressure management


-Nutrition consult


-Stop Lisinopril


-Avoid nephrotoxins and adjust all medications fro GFR/CrCL


-Mobility, off loading and frequent turning per facility protocol for  pressure 

ulcer prevention


-Hear failure measures, follow up echocardiogram


-Iron studies, Vit B12, folate, FOBT








CONDITION: CRITICAL


PROGNOSIS: FAIR


CODE STATUS: FULL CODE

## 2021-07-11 NOTE — HISTORY AND PHYSICAL REPORT
History of Present Illness


Date of examination: 07/11/21


Date of admission: 


07/11/2021


Chief complaint: 





Abdominal pain


Dark stool


Cough


Generalized weakness


History of present illness: 





80-year-old [place] male with known history of hypertension and diabetes 

mellitus, history of thoracic aortic dissection repair presents to the emergency

room today complaining of nausea, decreased appetite, abdominal pain and dark 

stool which has been ongoing for the past 2 weeks.


Most of the history was gotten from the granddaughter who was by the bedside 

because of the language barrier.





Abdominal pain is said to have been persistent over the past few weeks.  Patient

also reports significant weight loss.  He denies any hematemesis, no hematuria 

or dysuria, denies any constipation.


Patient denies any nonsteroidal anti-inflammatory medication use apart from 

aspirin.


Patient's last colonoscopy was over 15 years ago while living in [place].





Work-up in the emergency room today, significant findings were that of 

hemoglobin of 8.5 and hematocrit of 26.1, potassium was elevated at 5.9, BUN and

creatinine were elevated at 72 and 6.8, AST and ALT elevated at 517 and 448 r

espectively.  Total bilirubin was 1.50





CT of the abdomen and pelvis reveals cardiomegaly with interlobular septal 

thickening in the lung bases likely reflecting edema.  Moderate right and trace 

left pleural effusions.  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 

2019.





Patient is being admitted with acute on chronic renal failure, hyperkalemia, 

abnormal liver enzymes, anemia possible GI bleed.





Past History


Past Medical History: diabetes, hypertension, hyperlipidemia


Past Surgical History: Other (Aortic dissection repair, colonoscopy over 15 

years ago)


Social history: smoking (Former smoker)


Family history: no significant family history





Medications and Allergies


                                    Allergies











Allergy/AdvReac Type Severity Reaction Status Date / Time


 


No Known Allergies Allergy   Unverified 01/17/15 01:13











                                Home Medications











 Medication  Instructions  Recorded  Confirmed  Last Taken  Type


 


Amlodipine Besylate [Norvasc] 10 mg PO QDAY 07/10/21 07/10/21 Unknown History


 


Aspirin EC [Halfprin EC] 81 mg PO QDAY 07/10/21 07/10/21 Unknown History


 


Atorvastatin Calcium [Lipitor] 80 mg PO QDAY 07/10/21 07/10/21 Unknown History


 


Lisinopril [Zestril] 10 mg PO QDAY 07/10/21 07/10/21 Unknown History














Review of Systems


Constitutional: no fever, no chills


Ears, nose, mouth and throat: no nasal congestion, no sore throat


Cardiovascular: no chest pain, no palpitations


Respiratory: cough, shortness of breath, no cough with sputum


Gastrointestinal: abdominal pain, loss of appetite, other (Dark stool), no 

nausea, no vomiting, no diarrhea


Genitourinary Male: no dysuria, no hematuria, no flank pain


Musculoskeletal: no neck pain, no low back pain


Integumentary: no rash, no pruritis


Neurological: no headaches, no confusion


Psychiatric: no anxiety, no depression


Endocrine: no polyphagia, no polydipsia, no polyuria, no nocturia





Exam





- Constitutional


Vitals: 


                                        











Temp Pulse Resp BP Pulse Ox


 


 97.7 F   80   32 H  160/115   99 


 


 07/10/21 22:22  07/11/21 01:31  07/11/21 01:31  07/11/21 01:31  07/11/21 01:31











General appearance: Present: mild distress, well-nourished, other (Moderate 

pallor)





- EENT


Eyes: Present: PERRL, EOM intact.  Absent: scleral icterus


ENT: hearing intact, clear oral mucosa, dentition normal





- Neck


Neck: Present: supple, normal ROM





- Respiratory


Respiratory effort: normal


Respiratory: bilateral: rales, wheezing (Few scattered wheezes bilaterally)





- Cardiovascular


Rhythm: regular


Heart Sounds: Present: S1 & S2.  Absent: gallop, systolic murmur, diastolic 

murmur, rub, click





- Extremities


Extremities: no ischemia, pulses intact, pulses symmetrical, No edema, normal 

temperature


Peripheral Pulses: within normal limits





- Abdominal


General gastrointestinal: Present: soft, tender (Mild epigastric tenderness, no 

rebound tenderness, no guarding), non-distended, normal bowel sounds.  Absent: 

mass





- Integumentary


Integumentary: Present: clear, warm, dry.  Absent: rash





- Musculoskeletal


Musculoskeletal: strength equal bilaterally





- Psychiatric


Psychiatric: appropriate mood/affect, intact judgment & insight, memory intact, 

cooperative





- Neurologic


Neurologic: CNII-XII intact, no focal deficits, moves all extremities





Results





- Labs


CBC & Chem 7: 


                                 07/10/21 22:43





                                 07/10/21 22:43


Labs: 


                              Abnormal lab results











  07/10/21 07/10/21 Range/Units





  22:43 22:43 


 


Hgb  8.5 L   (11.8-15.2)  gm/dl


 


Hct  26.1 L   (35.5-45.6)  %


 


MCV  65 L   (84-94)  fl


 


MCH  21 L   (28-32)  pg


 


RDW  18.4 H   (13.2-15.2)  %


 


Seg Neuts % (Manual)  80.0 H   (40.0-70.0)  %


 


Lymphocytes % (Manual)  13.0 L   (13.4-35.0)  %


 


Lymphocytes # (Manual)  0.9 L   (1.2-5.4)  K/mm3


 


Potassium   5.9 H  (3.6-5.0)  mmol/L


 


Carbon Dioxide   14 L  (22-30)  mmol/L


 


BUN   72 H  (9-20)  mg/dL


 


Creatinine   6.8 H  (0.8-1.3)  mg/dL


 


Glucose   152 H  ()  mg/dL


 


Total Bilirubin   1.50 H  (0.1-1.2)  mg/dL


 


AST   517 H  (5-40)  units/L


 


ALT   448 H  (7-56)  units/L


 


Albumin   3.8 L  (3.9-5)  g/dL














Assessment and Plan





- Patient Problems


(1) Anemia


Current Visit: Yes   Status: Acute   


Plan to address problem: 


Possibly from GI bleed.


We will monitor CBC.


Stool Hemoccult done in the ER was negative


Consult placed to gastroenterology for evaluation and recommendation.


Meanwhile patient will be placed on proton pump inhibitor.








(2) Acute kidney injury superimposed on chronic kidney disease


Current Visit: Yes   Status: Acute   


Plan to address problem: 


Patient has known history of for chronic kidney disease.  However, baseline 

creatinine has been about 1.7.


We will place consult to nephrology for evaluation and recommendation.


We will continue to monitor BUN and creatinine.








(3) Hyperkalemia


Current Visit: Yes   Status: Acute   


Plan to address problem: 


Patient is received insulin and glucose, calcium gluconate sodium bicarb while 

in the  emergency room


We will monitor potassium levels and also monitor EKG.








(4) Elevated liver enzymes


Current Visit: Yes   Status: Acute   


Plan to address problem: 


Etiology unclear.  CT of the abdomen and pelvis did not reveal any acute 

abnormality.


Consult placed to gastroenterology for evaluation and recommendation.


Will monitor liver enzymes.








(5) GI bleed


Current Visit: Yes   Status: Acute   


Plan to address problem: 


We will check stool Hemoccult


We await further evaluation by gastroenterology.


Last colonoscopy was over 15 years ago.








(6) Hypertensive urgency


Current Visit: Yes   Status: Acute   


Plan to address problem: 


We will resume routine home medications and monitor vital signs closely.








(7) Pulmonary edema


Current Visit: Yes   Status: Acute   


Plan to address problem: 


Pulmonary edema shown on CT scan of the abdomen and pelvis.


Patient had a dose of IV Lasix.


Will check echocardiogram.


We will place a consult to cardiology for evaluation.








(8) DVT prophylaxis


Current Visit: Yes   Status: Acute   


Plan to address problem: 


Patient placed on sequential compression device.








(9) Full code status


Current Visit: Yes   Status: Acute   


Plan to address problem: 


Patient is full code.

## 2021-07-11 NOTE — CAT SCAN REPORT
CT ABDOMEN AND PELVIS WITHOUT CONTRAST



INDICATION / CLINICAL INFORMATION: Abdominal pain dark stools cachexia poor appetite.



TECHNIQUE: Axial CT images were obtained through the abdomen and pelvis without IV contrast.  All CT 
scans at this location are performed using CT dose reduction for ALARA by means of automated exposure
 control. 



COMPARISON: CT from 8/1/2019.



FINDINGS:



Cardiomegaly with interlobular septal thickening in the lung bases, likely reflecting edema. Moderate
 right and trace left pleural effusions. 5 cm thoracoabdominal aneurysm with chronic dissection exten
ding to the right iliac artery, unchanged in appearance from CT from 2019.



Liver, gallbladder, pancreas, spleen, and adrenals demonstrate no acute abnormality. There are bilate
ral renal cysts. Mild bilateral renal atrophy. No hydronephrosis. Bladder is decompressed with though
 grossly unremarkable.



No evidence of bowel obstruction or inflammation. No free fluid or adenopathy. No acute osseous findi
ngs.



IMPRESSION:



1. Similar findings of chronic aortic dissection and prominent aneurysmal dilatation of the thoracic 
and abdominal aorta. No evidence of rupture.

2. No acute abnormality of the abdomen or pelvis. No bowel obstruction or inflammation.

3. Other stable chronic and incidental findings as above.



Signer Name: Bang Wong MD 

Signed: 7/11/2021 1:29 AM

Workstation Name: [id number]

## 2021-07-11 NOTE — GASTROENTEROLOGY CONSULTATION
History of Present Illness





- Reason for Consult


Consult date: 07/11/21


Anemia, Abnormal LFTs


Requesting physician: TRENA RIVERO





- History of Present Illness





The patient was brought to the ER by his family for weakness and weight loss. He

was found to have signficant CHF, EDE, abnormal LFTs, and anemia on admit.  He 

had dark stools at home, x 2 weeks, per the granddaughter, but in the ICU has 

had no melena or hematochezia.  He had a colonoscopy 15 years ago but she does 

not recall a prior upper endoscopy, nor a hx of PUD.  He has been complaining of

diffuse abdominal pain with weight loss/loss of appetite for about a month.  He 

has not had any vomiting, but refuses to eat most food at home.  She denies 

excess NSAIDs use.  As regards the liver, there is no hx of abnormal liver 

disease, and a CT (noncontrast) was negative.  He is not a drinker, and there 

was no sign of ESLD on the CT.  Of note, she thinks he may have started 

atorvastatin in the last 6 months.  There are no other new medication.  The 

abdominal pain is not focal to the RUQ.





Past History


Past Medical History: diabetes, hypertension, hyperlipidemia


Past Surgical History: Other (Aortic dissection repair, colonoscopy over 15 

years ago)


Social history: smoking (Former smoker).  denies: alcohol abuse


Family history: no significant family history





Medications and Allergies


                                    Allergies











Allergy/AdvReac Type Severity Reaction Status Date / Time


 


No Known Allergies Allergy   Unverified 01/17/15 01:13











                                Home Medications











 Medication  Instructions  Recorded  Confirmed  Last Taken  Type


 


Amlodipine Besylate [Norvasc] 10 mg PO QDAY 07/10/21 07/10/21 Unknown History


 


Aspirin EC [Halfprin EC] 81 mg PO QDAY 07/10/21 07/10/21 Unknown History


 


Atorvastatin Calcium [Lipitor] 80 mg PO QDAY 07/10/21 07/10/21 Unknown History


 


Lisinopril [Zestril] 10 mg PO QDAY 07/10/21 07/10/21 Unknown History











Active Meds: 


Active Medications





Acetaminophen (Acetaminophen 325 Mg Tab)  650 mg PO Q6H PRN


   PRN Reason: Pain MILD(1-3)/Fever >100.5/HA


Albuterol/Ipratropium (Ipratropium/Albuterol Sulfate 3 Ml Ampul.Neb)  1 ampul IH

 Q6HRT [facility]


   Last Admin: 07/11/21 08:11 Dose:  1 ampul


   Documented by: 


Dextrose (Dextrose 50% In Water (25gm) 50 Ml Syringe)  0 ml IV Q30MIN PRN; 

Protocol


   PRN Reason: Hypoglycemia


Hydralazine HCl (Hydralazine 20 Mg/1 Ml Inj)  10 mg IV Q4H PRN


   PRN Reason: Blood Pressure


Insulin Human Regular (Insulin Regular, Human 100 Units/1 Ml)  0 units SUB-Q 

ACHS [facility]; Protocol


   Last Admin: 07/11/21 09:10 Dose:  Not Given


   Documented by: 


Magnesium Hydroxide (Magnesium Hydroxide (Mom) Oral Liqd Udc)  30 ml PO Q4H PRN


   PRN Reason: Constipation


Morphine Sulfate (Morphine 2 Mg/1 Ml Inj)  2 mg IV Q4H PRN


   PRN Reason: Pain, Moderate (4-6)


   Last Admin: 07/11/21 04:54 Dose:  2 mg


   Documented by: 


Morphine Sulfate (Morphine 4 Mg/1 Ml Inj)  4 mg IV Q4H PRN


   PRN Reason: Pain , Severe (7-10)


Ondansetron HCl (Ondansetron 4 Mg/2 Ml Inj)  4 mg IV Q8H PRN


   PRN Reason: Nausea And Vomiting


Pantoprazole Sodium (Pantoprazole 40 Mg Inj)  40 mg IV BID SOTERO


Sodium Chloride (Sodium Chloride 0.9% 10 Ml Flush Syringe)  10 ml IV BID SOTERO


Sodium Chloride (Sodium Chloride 0.9% 10 Ml Flush Syringe)  10 ml IV PRN PRN


   PRN Reason: LINE FLUSH





I HAVE REVIEWED/RECONCILED MEDICATIONS





Review of Systems





- Review of Systems


All systems: negative (as noted in the HPI (per the granddaughter translating))





Exam





- Constitutional


Vital Signs: 


                                        











Temp Pulse Resp BP Pulse Ox


 


 97.4 F L  95 H  18   181/110   96 


 


 07/11/21 07:32  07/11/21 09:00  07/11/21 09:00  07/11/21 09:00  07/11/21 09:00











General appearance: mild distress, disheveled





- EENT


Eyes: PERRL, EOM intact, scleral icterus


ENT: hearing intact, clear oral mucosa, poor dentition





- Neck


Neck: supple, normal ROM





- Respiratory


Respiratory effort: labored


Respiratory: bilateral: diminished





- Cardiovascular


Rhythm: regular


Heart Sounds: Present: S1 & S2, systolic murmur





- Gastrointestinal


General gastrointestinal: Present: soft, tender (Diffuse mild tenderness, no 

guarding), non-distended





- Integumentary


Integumentary: Present: clear, warm, dry





- Neurologic


Neurological: oriented to person, strength equal bilaterally





- Labs


CBC & Chem 7: 


                                 07/11/21 08:23





                                 07/11/21 08:23


Lab Results: 


                         Laboratory Results - last 24 hr











  07/10/21 07/10/21 07/10/21





  22:43 22:43 Unknown


 


WBC  7.2  


 


RBC  4.01  


 


Hgb  8.5 L  


 


Hct  26.1 L  


 


MCV  65 L  


 


MCH  21 L  


 


MCHC  33  


 


RDW  18.4 H  


 


Plt Count  151  


 


Add Manual Diff  Complete  


 


Total Counted  100  


 


Seg Neuts % (Manual)  80.0 H  


 


Band Neutrophils %  1.0  


 


Lymphocytes % (Manual)  13.0 L  


 


Monocytes % (Manual)  6.0  


 


Nucleated RBC %  Not Reportable  


 


Seg Neutrophils # Man  5.8  


 


Band Neutrophils #  0.1  


 


Lymphocytes # (Manual)  0.9 L  


 


Abs React Lymphs (Man)  0.0  


 


Monocytes # (Manual)  0.4  


 


Eosinophils # (Manual)  0.0  


 


Basophils # (Manual)  0.0  


 


Metamyelocytes #  0.0  


 


Myelocytes #  0.0  


 


Promyelocytes #  0.0  


 


Blast Cells #  0.0  


 


WBC Morphology  Not Reportable  


 


Hypersegmented Neuts  Not Reportable  


 


Hyposegmented Neuts  Not Reportable  


 


Hypogranular Neuts  Not Reportable  


 


Smudge Cells  Not Reportable  


 


Toxic Granulation  Not Reportable  


 


Toxic Vacuolation  Not Reportable  


 


Dohle Bodies  Not Reportable  


 


Pelger-Huet Anomaly  Not Reportable  


 


Curt Rods  Not Reportable  


 


Platelet Estimate  Consistent w auto  


 


Clumped Platelets  Not Reportable  


 


Plt Clumps, EDTA  Not Reportable  


 


Large Platelets  Not Reportable  


 


Giant Platelets  Not Reportable  


 


Platelet Satelliting  Not Reportable  


 


Plt Morphology Comment  Not Reportable  


 


RBC Morphology  Not Reportable  


 


Dimorphic RBCs  Not Reportable  


 


Polychromasia  Not Reportable  


 


Hypochromasia  Not Reportable  


 


Poikilocytosis  Not Reportable  


 


Anisocytosis  1+  


 


Microcytosis  Not Reportable  


 


Macrocytosis  Not Reportable  


 


Spherocytes  Not Reportable  


 


Pappenheimer Bodies  Not Reportable  


 


Sickle Cells  Not Reportable  


 


Target Cells  Not Reportable  


 


Tear Drop Cells  Not Reportable  


 


Ovalocytes  Not Reportable  


 


Helmet Cells  Not Reportable  


 


Negrete-Colerain Bodies  Not Reportable  


 


Cabot Rings  Not Reportable  


 


Michelet Cells  Not Reportable  


 


Bite Cells  Not Reportable  


 


Crenated Cell  Not Reportable  


 


Elliptocytes  Not Reportable  


 


Acanthocytes (Spur)  Not Reportable  


 


Rouleaux  Not Reportable  


 


Hemoglobin C Crystals  Not Reportable  


 


Schistocytes  Not Reportable  


 


Malaria parasites  Not Reportable  


 


Bryan Bodies  Not Reportable  


 


Hem Pathologist Commnt  No  


 


Sodium   137 


 


Potassium   5.9 H 


 


Chloride   101.0 


 


Carbon Dioxide   14 L 


 


Anion Gap   28 


 


BUN   72 H 


 


Creatinine   6.8 H 


 


Estimated GFR   8 


 


BUN/Creatinine Ratio   11 


 


Glucose   152 H 


 


POC Glucose   


 


Hemoglobin A1c   


 


Calcium   8.7 


 


Total Bilirubin   1.50 H 


 


AST   517 H 


 


ALT   448 H 


 


Alkaline Phosphatase   122 


 


Total Protein   6.7 


 


Albumin   3.8 L 


 


Albumin/Globulin Ratio   1.3 


 


Lipase   51 


 


Urine Color    Yellow


 


Urine Turbidity    Cloudy


 


Urine pH    5.0


 


Ur Specific Gravity    1.018


 


Urine Protein    >500


 


Urine Glucose (UA)    50


 


Urine Ketones    Neg


 


Urine Blood    Mod


 


Urine Nitrite    Neg


 


Urine Bilirubin    Neg


 


Urine Urobilinogen    < 2.0


 


Ur Leukocyte Esterase    Neg


 


Urine WBC (Auto)    4.0


 


Urine RBC (Auto)    10.0


 


U Epithel Cells (Auto)    1.0


 


Urine Mucus    Few


 


Hepatitis A IgM Ab   


 


Hep Bs Antigen   


 


Hep B Core IgM Ab   


 


Hepatitis C Antibody   














  07/11/21 07/11/21 07/11/21





  00:00 02:00 04:28


 


WBC   


 


RBC   


 


Hgb   


 


Hct   


 


MCV   


 


MCH   


 


MCHC   


 


RDW   


 


Plt Count   


 


Add Manual Diff   


 


Total Counted   


 


Seg Neuts % (Manual)   


 


Band Neutrophils %   


 


Lymphocytes % (Manual)   


 


Monocytes % (Manual)   


 


Nucleated RBC %   


 


Seg Neutrophils # Man   


 


Band Neutrophils #   


 


Lymphocytes # (Manual)   


 


Abs React Lymphs (Man)   


 


Monocytes # (Manual)   


 


Eosinophils # (Manual)   


 


Basophils # (Manual)   


 


Metamyelocytes #   


 


Myelocytes #   


 


Promyelocytes #   


 


Blast Cells #   


 


WBC Morphology   


 


Hypersegmented Neuts   


 


Hyposegmented Neuts   


 


Hypogranular Neuts   


 


Smudge Cells   


 


Toxic Granulation   


 


Toxic Vacuolation   


 


Dohle Bodies   


 


Pelger-Huet Anomaly   


 


Curt Rods   


 


Platelet Estimate   


 


Clumped Platelets   


 


Plt Clumps, EDTA   


 


Large Platelets   


 


Giant Platelets   


 


Platelet Satelliting   


 


Plt Morphology Comment   


 


RBC Morphology   


 


Dimorphic RBCs   


 


Polychromasia   


 


Hypochromasia   


 


Poikilocytosis   


 


Anisocytosis   


 


Microcytosis   


 


Macrocytosis   


 


Spherocytes   


 


Pappenheimer Bodies   


 


Sickle Cells   


 


Target Cells   


 


Tear Drop Cells   


 


Ovalocytes   


 


Helmet Cells   


 


Negrete-Colerain Bodies   


 


Cabot Rings   


 


Michelet Cells   


 


Bite Cells   


 


Crenated Cell   


 


Elliptocytes   


 


Acanthocytes (Spur)   


 


Rouleaux   


 


Hemoglobin C Crystals   


 


Schistocytes   


 


Malaria parasites   


 


Bryan Bodies   


 


Hem Pathologist Commnt   


 


Sodium    141


 


Potassium    5.2 H


 


Chloride    102.9


 


Carbon Dioxide    20 L


 


Anion Gap    23


 


BUN    72 H


 


Creatinine    6.5 H


 


Estimated GFR    8


 


BUN/Creatinine Ratio    11


 


Glucose    93


 


POC Glucose   


 


Hemoglobin A1c   5.3 


 


Calcium    9.2


 


Total Bilirubin    1.40 H


 


AST    604 H


 


ALT    491 H


 


Alkaline Phosphatase    108


 


Total Protein    6.2 L


 


Albumin    3.8 L


 


Albumin/Globulin Ratio    1.6


 


Lipase   


 


Urine Color   


 


Urine Turbidity   


 


Urine pH   


 


Ur Specific Gravity   


 


Urine Protein   


 


Urine Glucose (UA)   


 


Urine Ketones   


 


Urine Blood   


 


Urine Nitrite   


 


Urine Bilirubin   


 


Urine Urobilinogen   


 


Ur Leukocyte Esterase   


 


Urine WBC (Auto)   


 


Urine RBC (Auto)   


 


U Epithel Cells (Auto)   


 


Urine Mucus   


 


Hepatitis A IgM Ab  Non-reactive  


 


Hep Bs Antigen  Non-reactive  


 


Hep B Core IgM Ab  Non-reactive  


 


Hepatitis C Antibody  Non-reactive  














  07/11/21 07/11/21 07/11/21





  04:32 08:23 08:23


 


WBC   7.4 


 


RBC   3.80 


 


Hgb   7.8 L 


 


Hct   24.9 L 


 


MCV   66 L 


 


MCH   21 L 


 


MCHC   32 


 


RDW   18.0 H 


 


Plt Count   98 L 


 


Add Manual Diff   


 


Total Counted   


 


Seg Neuts % (Manual)   


 


Band Neutrophils %   


 


Lymphocytes % (Manual)   


 


Monocytes % (Manual)   


 


Nucleated RBC %   


 


Seg Neutrophils # Man   


 


Band Neutrophils #   


 


Lymphocytes # (Manual)   


 


Abs React Lymphs (Man)   


 


Monocytes # (Manual)   


 


Eosinophils # (Manual)   


 


Basophils # (Manual)   


 


Metamyelocytes #   


 


Myelocytes #   


 


Promyelocytes #   


 


Blast Cells #   


 


WBC Morphology   


 


Hypersegmented Neuts   


 


Hyposegmented Neuts   


 


Hypogranular Neuts   


 


Smudge Cells   


 


Toxic Granulation   


 


Toxic Vacuolation   


 


Dohle Bodies   


 


Pelger-Huet Anomaly   


 


Curt Rods   


 


Platelet Estimate   


 


Clumped Platelets   


 


Plt Clumps, EDTA   


 


Large Platelets   


 


Giant Platelets   


 


Platelet Satelliting   


 


Plt Morphology Comment   


 


RBC Morphology   


 


Dimorphic RBCs   


 


Polychromasia   


 


Hypochromasia   


 


Poikilocytosis   


 


Anisocytosis   


 


Microcytosis   


 


Macrocytosis   


 


Spherocytes   


 


Pappenheimer Bodies   


 


Sickle Cells   


 


Target Cells   


 


Tear Drop Cells   


 


Ovalocytes   


 


Helmet Cells   


 


Negrete-Colerain Bodies   


 


Cabot Rings   


 


Michelet Cells   


 


Bite Cells   


 


Crenated Cell   


 


Elliptocytes   


 


Acanthocytes (Spur)   


 


Rouleaux   


 


Hemoglobin C Crystals   


 


Schistocytes   


 


Malaria parasites   


 


Bryan Bodies   


 


Hem Pathologist Commnt   


 


Sodium    137


 


Potassium    5.7 H


 


Chloride    103.7


 


Carbon Dioxide    14 L


 


Anion Gap    25


 


BUN    77 H


 


Creatinine    6.7 H


 


Estimated GFR    8


 


BUN/Creatinine Ratio    11


 


Glucose    126 H


 


POC Glucose  114 H  


 


Hemoglobin A1c   


 


Calcium    8.6


 


Total Bilirubin   


 


AST   


 


ALT   


 


Alkaline Phosphatase   


 


Total Protein   


 


Albumin   


 


Albumin/Globulin Ratio   


 


Lipase   


 


Urine Color   


 


Urine Turbidity   


 


Urine pH   


 


Ur Specific Gravity   


 


Urine Protein   


 


Urine Glucose (UA)   


 


Urine Ketones   


 


Urine Blood   


 


Urine Nitrite   


 


Urine Bilirubin   


 


Urine Urobilinogen   


 


Ur Leukocyte Esterase   


 


Urine WBC (Auto)   


 


Urine RBC (Auto)   


 


U Epithel Cells (Auto)   


 


Urine Mucus   


 


Hepatitis A IgM Ab   


 


Hep Bs Antigen   


 


Hep B Core IgM Ab   


 


Hepatitis C Antibody   














  07/11/21





  08:58


 


WBC 


 


RBC 


 


Hgb 


 


Hct 


 


MCV 


 


MCH 


 


MCHC 


 


RDW 


 


Plt Count 


 


Add Manual Diff 


 


Total Counted 


 


Seg Neuts % (Manual) 


 


Band Neutrophils % 


 


Lymphocytes % (Manual) 


 


Monocytes % (Manual) 


 


Nucleated RBC % 


 


Seg Neutrophils # Man 


 


Band Neutrophils # 


 


Lymphocytes # (Manual) 


 


Abs React Lymphs (Man) 


 


Monocytes # (Manual) 


 


Eosinophils # (Manual) 


 


Basophils # (Manual) 


 


Metamyelocytes # 


 


Myelocytes # 


 


Promyelocytes # 


 


Blast Cells # 


 


WBC Morphology 


 


Hypersegmented Neuts 


 


Hyposegmented Neuts 


 


Hypogranular Neuts 


 


Smudge Cells 


 


Toxic Granulation 


 


Toxic Vacuolation 


 


Dohle Bodies 


 


Pelger-Huet Anomaly 


 


Curt Rods 


 


Platelet Estimate 


 


Clumped Platelets 


 


Plt Clumps, EDTA 


 


Large Platelets 


 


Giant Platelets 


 


Platelet Satelliting 


 


Plt Morphology Comment 


 


RBC Morphology 


 


Dimorphic RBCs 


 


Polychromasia 


 


Hypochromasia 


 


Poikilocytosis 


 


Anisocytosis 


 


Microcytosis 


 


Macrocytosis 


 


Spherocytes 


 


Pappenheimer Bodies 


 


Sickle Cells 


 


Target Cells 


 


Tear Drop Cells 


 


Ovalocytes 


 


Helmet Cells 


 


Negrete-Colerain Bodies 


 


Cabot Rings 


 


Michelet Cells 


 


Bite Cells 


 


Crenated Cell 


 


Elliptocytes 


 


Acanthocytes (Spur) 


 


Rouleaux 


 


Hemoglobin C Crystals 


 


Schistocytes 


 


Malaria parasites 


 


Bryan Bodies 


 


Hem Pathologist Commnt 


 


Sodium 


 


Potassium 


 


Chloride 


 


Carbon Dioxide 


 


Anion Gap 


 


BUN 


 


Creatinine 


 


Estimated GFR 


 


BUN/Creatinine Ratio 


 


Glucose 


 


POC Glucose  139 H


 


Hemoglobin A1c 


 


Calcium 


 


Total Bilirubin 


 


AST 


 


ALT 


 


Alkaline Phosphatase 


 


Total Protein 


 


Albumin 


 


Albumin/Globulin Ratio 


 


Lipase 


 


Urine Color 


 


Urine Turbidity 


 


Urine pH 


 


Ur Specific Gravity 


 


Urine Protein 


 


Urine Glucose (UA) 


 


Urine Ketones 


 


Urine Blood 


 


Urine Nitrite 


 


Urine Bilirubin 


 


Urine Urobilinogen 


 


Ur Leukocyte Esterase 


 


Urine WBC (Auto) 


 


Urine RBC (Auto) 


 


U Epithel Cells (Auto) 


 


Urine Mucus 


 


Hepatitis A IgM Ab 


 


Hep Bs Antigen 


 


Hep B Core IgM Ab 


 


Hepatitis C Antibody 














Assessment and Plan





- Patient Problems


(1) CHF (congestive heart failure), NYHA class III


Current Visit: Yes   Status: Acute   


Plan to address problem: 


- Will get Cards on consult given pleural effusions, severe HTN, and poorly 

controlled CHF.








(2) Acute kidney injury superimposed on chronic kidney disease


Current Visit: Yes   Status: Acute   





(3) Elevated liver enzymes


Current Visit: Yes   Status: Acute   


Plan to address problem: 


- Hepatitis serologies negative.


- No gross mass on CT, but this would be highest on differential.  With EDE, and

 (?) new atorvastatin, would consider statin liver injury as well.


- Will get a RUQ US; if not diagnostic, would consider non-contrast MRI.








(4) GI bleed


Current Visit: Yes   Status: Acute   


Plan to address problem: 


- Continue current protonix.


- Will need EGD (and possibly colonoscopy) in the next few days, after 

CHF/HTN/EDE has been assessed and improved.


- OK to continue cardiac ASA, but would avoid other blood thinners at present.








(5) Hypertensive urgency


Current Visit: Yes   Status: Acute

## 2021-07-11 NOTE — XRAY REPORT
XR chest 1V ap



INDICATION / CLINICAL INFORMATION: SOB.



COMPARISON: 1/17/2015



FINDINGS:



SUPPORT DEVICES: None.

HEART /PULMONARY VASCULATURE: Cardiomegaly with pulmonary vasculature congestion. Tortuous and aneury
smal thoracic aorta noted.

LUNGS / PLEURA: Layering bilateral pleural effusions are better seen on concurrent CT. Patchy bibasil
ar airspace opacities likely reflect atelectasis. No pneumothorax.



ADDITIONAL FINDINGS: No significant additional findings.



IMPRESSION:

CHF/volume overload with bilateral pleural effusions and adjacent volume loss.



Signer Name: Bang Wong MD 

Signed: 7/11/2021 2:27 AM

Workstation Name: [id number]

## 2021-07-12 LAB
%HYPO/RBC NFR BLD AUTO: (no result) %
ALBUMIN SERPL-MCNC: 3.2 G/DL (ref 3.9–5)
ALT SERPL-CCNC: 636 UNITS/L (ref 7–56)
ANISOCYTOSIS BLD QL SMEAR: (no result)
BAND NEUTROPHILS # (MANUAL): 0.1 K/MM3
BUN SERPL-MCNC: 89 MG/DL (ref 9–20)
BUN/CREAT SERPL: 12 %
CALCIUM SERPL-MCNC: 8.6 MG/DL (ref 8.4–10.2)
HCT VFR BLD CALC: 22.8 % (ref 35.5–45.6)
HEMOLYSIS INDEX: 26
HGB BLD-MCNC: 7.3 GM/DL (ref 11.8–15.2)
MCHC RBC AUTO-ENTMCNC: 32 % (ref 32–34)
MCV RBC AUTO: 65 FL (ref 84–94)
MYELOCYTES # (MANUAL): 0 K/MM3
PLATELET # BLD: 88 K/MM3 (ref 140–440)
PROMYELOCYTES # (MANUAL): 0 K/MM3
RBC # BLD AUTO: 3.5 M/MM3 (ref 3.65–5.03)
SCHISTOCYTES BLD QL SMEAR: (no result)
TOTAL CELLS COUNTED BLD: 100

## 2021-07-12 PROCEDURE — 05HN33Z INSERTION OF INFUSION DEVICE INTO LEFT INTERNAL JUGULAR VEIN, PERCUTANEOUS APPROACH: ICD-10-PCS | Performed by: INTERNAL MEDICINE

## 2021-07-12 PROCEDURE — 5A1D70Z PERFORMANCE OF URINARY FILTRATION, INTERMITTENT, LESS THAN 6 HOURS PER DAY: ICD-10-PCS | Performed by: INTERNAL MEDICINE

## 2021-07-12 PROCEDURE — 30230N1 TRANSFUSION OF NONAUTOLOGOUS RED BLOOD CELLS INTO PERIPHERAL VEIN, OPEN APPROACH: ICD-10-PCS | Performed by: INTERNAL MEDICINE

## 2021-07-12 PROCEDURE — B514YZA FLUOROSCOPY OF LEFT JUGULAR VEINS USING OTHER CONTRAST, GUIDANCE: ICD-10-PCS | Performed by: INTERNAL MEDICINE

## 2021-07-12 RX ADMIN — IPRATROPIUM BROMIDE AND ALBUTEROL SULFATE SCH AMPUL: .5; 3 SOLUTION RESPIRATORY (INHALATION) at 03:46

## 2021-07-12 RX ADMIN — FUROSEMIDE SCH MG: 10 INJECTION, SOLUTION INTRAVENOUS at 16:45

## 2021-07-12 RX ADMIN — INSULIN HUMAN SCH: 100 INJECTION, SOLUTION PARENTERAL at 09:22

## 2021-07-12 RX ADMIN — IPRATROPIUM BROMIDE AND ALBUTEROL SULFATE SCH AMPUL: .5; 3 SOLUTION RESPIRATORY (INHALATION) at 07:52

## 2021-07-12 RX ADMIN — PANTOPRAZOLE SODIUM SCH MG: 40 INJECTION, POWDER, FOR SOLUTION INTRAVENOUS at 11:49

## 2021-07-12 RX ADMIN — PANTOPRAZOLE SODIUM SCH MG: 40 INJECTION, POWDER, FOR SOLUTION INTRAVENOUS at 21:54

## 2021-07-12 RX ADMIN — INSULIN HUMAN SCH: 100 INJECTION, SOLUTION PARENTERAL at 17:38

## 2021-07-12 RX ADMIN — ASPIRIN SCH MG: 81 TABLET, COATED ORAL at 11:49

## 2021-07-12 RX ADMIN — Medication SCH ML: at 11:49

## 2021-07-12 RX ADMIN — INSULIN HUMAN SCH: 100 INJECTION, SOLUTION PARENTERAL at 11:56

## 2021-07-12 RX ADMIN — Medication SCH ML: at 21:55

## 2021-07-12 RX ADMIN — INSULIN HUMAN SCH UNITS: 100 INJECTION, SOLUTION PARENTERAL at 21:54

## 2021-07-12 NOTE — PROGRESS NOTE
Assessment and Plan


Assessment and plan: 





80-year-old Yi male with known history of hypertension and diabetes 

mellitus, history of thoracic aortic dissection repair presents to the emergency

room today complaining of nausea, decreased appetite, abdominal pain and dark 

stool which has been ongoing for the past 2 weeks.


Most of the history was gotten from the granddaughter who was by the bedside 

because of the language barrier.





Abdominal pain is said to have been persistent over the past few weeks.  Patient

also reports significant weight loss.  He denies any hematemesis, no hematuria 

or dysuria, denies any constipation.


Patient denies any nonsteroidal anti-inflammatory medication use apart from 

aspirin.


Patient's last colonoscopy was over 15 years ago while living in [place].





Work-up in the emergency room today, significant findings were that of 

hemoglobin of 8.5 and hematocrit of 26.1, potassium was elevated at 5.9, BUN and

creatinine were elevated at 72 and 6.8, AST and ALT elevated at 517 and 448 

respectively.  Total bilirubin was 1.50





CT of the abdomen and pelvis reveals cardiomegaly with interlobular septal 

thickening in the lung bases likely reflecting edema.  Moderate right and trace 

left pleural effusions.  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 

2019.





Patient admitted with acute on chronic renal failure, hyperkalemia, abnormal 

liver enzymes, anemia possible GI bleed on 7/11/21





No acute events overnight 











NEURO-language barrier


Non english speaking - language line at bedside


PRN tylenol for pain


ALSTON; no focal deficit


family at bedside





CV: hx HTN; hx aortic dissection with repair


coreg, norvasc, hydral scheduled


daily NTG per cards


PRN hydral for HTN 


pulm critical care following


SR


Decompensated CHF


Echo EF 45-50%.


Chronic thoraco-abdominal aneurysm.


asa 81 mg OK with GI 





Resp-nap


NC PRN for Sat > 92


nebs PRN


pulmonary hygiene 





GI-GIB; transaminitis 


GI following


possible EGD/liver biopsy Wed 


FLD ok per GI


   NPO p MN 


PPI BID


trend LFT- tbili .7


hepatitis neg panel





- 


Acute kidney injury superimposed on CKD


EDE superimposed on CKD


CT abdomen negative for hydro.


Urine studies ordered by renal 


Renal prognosis is guarded to poor per renal 


Avoid nephrotoxic agents.


Meds dosage based on GFR.


Patient require hemodialysis due to significant decline in the GFR, associated 

hyperkalemia, metabolic acidosis and volume overload.


   HD today for 2L- tolerated well 


Hyperkalemia, improved, monitor.


Anion-gap Metabolic acidosis, HD today, monitor.


Volume overload, UF with HD, resume IV lasix.


trend BUN, Cr, K and phos-----4.9---8.2 this AM


lasix 80 mg IV today per nephrology 


   minimal response


voiding to urinal 


strict I/O


   neg 100 ml over the last 24 hours


trend electrolytes 





Heme- blood loss anemia; thrombocytopenia 


1 RBC today with HD - hbg 7.3


trend hgb


no bleeding on exam today


no bloody emesis or stool 





ID-nap 


trend fever and WBC curve


afebrile


WBC 8





Endo: hx DM


SSI PRN 


avoid hypoglycemia 











Disposition Plan: tbd


Total Time Spent with Patient (Minutes): 60





History


Interval history: 





no acute events overnight 





Hospitalist Physical





- Constitutional


Vitals: 


                                        











Temp Pulse Resp BP Pulse Ox


 


 98.7 F   68   15   155/85   85 


 


 07/12/21 13:50  07/12/21 14:00  07/12/21 13:50  07/12/21 14:00  07/12/21 13:00











General appearance: Present: cachectic, other (Patient is lethargic, frail and 

chronically ill-appearing)





- EENT


Eyes: Present: PERRL, EOM intact


ENT: hearing intact, clear oral mucosa





- Neck


Neck: Present: supple, normal ROM





- Respiratory


Respiratory effort: normal





- Cardiovascular


Rhythm: regular


Heart Sounds: Present: S1 & S2





- Extremities


Extremities: no ischemia


Extremity abnormal: edema


Peripheral Pulses: within normal limits





- Abdominal


General gastrointestinal: soft, non-tender





- Integumentary


Integumentary: Present: clear, warm, dry





- Psychiatric


Psychiatric: appropriate mood/affect, other





- Allied Health


Allied health notes reviewed: nursing, social work, case management





Results





- Labs


CBC & Chem 7: 


                                 07/12/21 04:25





                                 07/12/21 04:25


Labs: 


                             Laboratory Last Values











WBC  8.1 K/mm3 (4.5-11.0)   07/12/21  04:25    


 


RBC  3.50 M/mm3 (3.65-5.03)  L  07/12/21  04:25    


 


Hgb  7.3 gm/dl (11.8-15.2)  L  07/12/21  04:25    


 


Hct  22.8 % (35.5-45.6)  L  07/12/21  04:25    


 


MCV  65 fl (84-94)  L  07/12/21  04:25    


 


MCH  21 pg (28-32)  L  07/12/21  04:25    


 


MCHC  32 % (32-34)   07/12/21  04:25    


 


RDW  17.9 % (13.2-15.2)  H  07/12/21  04:25    


 


Plt Count  88 K/mm3 (140-440)  L  07/12/21  04:25    


 


Add Manual Diff  Complete   07/12/21  04:25    


 


Total Counted  100   07/12/21  04:25    


 


Seg Neuts % (Manual)  87.0 % (40.0-70.0)  H  07/12/21  04:25    


 


Band Neutrophils %  1.0 %  07/12/21  04:25    


 


Lymphocytes % (Manual)  5.0 % (13.4-35.0)  L  07/12/21  04:25    


 


Monocytes % (Manual)  7.0 % (0.0-7.3)   07/12/21  04:25    


 


Nucleated RBC %  1.0 % (0.0-0.9)  H  07/12/21  04:25    


 


Seg Neutrophils # Man  7.0 K/mm3 (1.8-7.7)   07/12/21  04:25    


 


Band Neutrophils #  0.1 K/mm3  07/12/21  04:25    


 


Lymphocytes # (Manual)  0.4 K/mm3 (1.2-5.4)  L  07/12/21  04:25    


 


Abs React Lymphs (Man)  0.0 K/mm3  07/12/21  04:25    


 


Monocytes # (Manual)  0.6 K/mm3 (0.0-0.8)   07/12/21  04:25    


 


Eosinophils # (Manual)  0.0 K/mm3 (0.0-0.4)   07/12/21  04:25    


 


Basophils # (Manual)  0.0 K/mm3 (0.0-0.1)   07/12/21  04:25    


 


Metamyelocytes #  0.0 K/mm3  07/12/21  04:25    


 


Myelocytes #  0.0 K/mm3  07/12/21  04:25    


 


Promyelocytes #  0.0 K/mm3  07/12/21  04:25    


 


Blast Cells #  0.0 K/mm3  07/12/21  04:25    


 


WBC Morphology  Not Reportable   07/12/21  04:25    


 


Hypersegmented Neuts  Not Reportable   07/12/21  04:25    


 


Hyposegmented Neuts  Not Reportable   07/12/21  04:25    


 


Hypogranular Neuts  Not Reportable   07/12/21  04:25    


 


Smudge Cells  Not Reportable   07/12/21  04:25    


 


Toxic Granulation  Not Reportable   07/12/21  04:25    


 


Toxic Vacuolation  Not Reportable   07/12/21  04:25    


 


Dohle Bodies  Not Reportable   07/12/21  04:25    


 


Pelger-Huet Anomaly  Not Reportable   07/12/21  04:25    


 


Curt Rods  Not Reportable   07/12/21  04:25    


 


Platelet Estimate  Consistent w auto   07/12/21  04:25    


 


Clumped Platelets  Not Reportable   07/12/21  04:25    


 


Plt Clumps, EDTA  Not Reportable   07/12/21  04:25    


 


Large Platelets  Not Reportable   07/12/21  04:25    


 


Giant Platelets  Not Reportable   07/12/21  04:25    


 


Platelet Satelliting  Not Reportable   07/12/21  04:25    


 


Plt Morphology Comment  Not Reportable   07/12/21  04:25    


 


RBC Morphology  Not Reportable   07/12/21  04:25    


 


Dimorphic RBCs  Not Reportable   07/12/21  04:25    


 


Polychromasia  Few   07/12/21  04:25    


 


Hypochromasia  2+   07/12/21  04:25    


 


Poikilocytosis  Not Reportable   07/12/21  04:25    


 


Anisocytosis  2+   07/12/21  04:25    


 


Microcytosis  Not Reportable   07/12/21  04:25    


 


Macrocytosis  Not Reportable   07/12/21  04:25    


 


Spherocytes  Not Reportable   07/12/21  04:25    


 


Pappenheimer Bodies  Not Reportable   07/12/21  04:25    


 


Sickle Cells  Not Reportable   07/12/21  04:25    


 


Target Cells  Not Reportable   07/12/21  04:25    


 


Tear Drop Cells  Not Reportable   07/12/21  04:25    


 


Ovalocytes  Not Reportable   07/12/21  04:25    


 


Helmet Cells  Not Reportable   07/12/21  04:25    


 


[place] Bodies  Not Reportable   07/12/21  04:25    


 


C[place] Rings  Not Reportable   07/12/21  04:25    


 


[place] Cells  Not Reportable   07/12/21  04:25    


 


Bite Cells  Not Reportable   07/12/21  04:25    


 


Crenated Cell  Not Reportable   07/12/21  04:25    


 


Elliptocytes  Not Reportable   07/12/21  04:25    


 


Acanthocytes (Spur)  Not Reportable   07/12/21  04:25    


 


Rouleaux  Not Reportable   07/12/21  04:25    


 


Hemoglobin C Crystals  Not Reportable   07/12/21  04:25    


 


Schistocytes  1+   07/12/21  04:25    


 


Malaria parasites  Not Reportable   07/12/21  04:25    


 


Bryan Bodies  Not Reportable   07/12/21  04:25    


 


Hem Pathologist Commnt  No   07/12/21  04:25    


 


ABG pH  7.384 pH Units (7.350-7.450)   07/11/21  14:40    


 


ABG pCO2  32.3 mm Hg  07/11/21  14:40    


 


ABG pO2  107.8 mm Hg (80.0-90.0)  H  07/11/21  14:40    


 


ABG HCO3  18.8 mmol/L (20.0-26.0)  L  07/11/21  14:40    


 


ABG O2 Saturation  97.9 % (95.0-99.0)   07/11/21  14:40    


 


ABG O2 Content  10.3  (0.0-44)   07/11/21  14:40    


 


ABG Base Excess  -5.6 mmol/L (-2.0-3.0)  L  07/11/21  14:40    


 


ABG Hemoglobin  7.4 gm/dl (14.0-18.0)  L  07/11/21  14:40    


 


ABG Carboxyhemoglobin  1.5 % (0.0-5.0)   07/11/21  14:40    


 


ABG Methemoglobin  0.5 % (0.0-1.5)   07/11/21  14:40    


 


Oxyhemoglobin  95.9 % (95.0-99.0)   07/11/21  14:40    


 


FiO2  28 %  07/11/21  14:40    


 


Sodium  141 mmol/L (137-145)   07/12/21  04:25    


 


Potassium  4.9 mmol/L (3.6-5.0)   07/12/21  04:25    


 


Chloride  103.1 mmol/L ()   07/12/21  04:25    


 


Carbon Dioxide  20 mmol/L (22-30)  L  07/12/21  04:25    


 


Anion Gap  23 mmol/L  07/12/21  04:25    


 


BUN  89 mg/dL (9-20)  H  07/12/21  04:25    


 


Creatinine  7.6 mg/dL (0.8-1.3)  H  07/12/21  04:25    


 


Estimated GFR  7 ml/min  07/12/21  04:25    


 


BUN/Creatinine Ratio  12 %  07/12/21  04:25    


 


Glucose  129 mg/dL ()  H  07/12/21  04:25    


 


POC Glucose  120 mg/dL ()  H  07/12/21  11:11    


 


Hemoglobin A1c  5.3 % (4-6)   07/11/21  02:00    


 


Calcium  8.6 mg/dL (8.4-10.2)   07/12/21  04:25    


 


Phosphorus  8.20 mg/dL (2.5-4.5)  H  07/12/21  04:25    


 


Total Bilirubin  0.70 mg/dL (0.1-1.2)   07/12/21  04:25    


 


AST  502 units/L (5-40)  H  07/12/21  04:25    


 


ALT  636 units/L (7-56)  H  07/12/21  04:25    


 


Alkaline Phosphatase  101 units/L ()   07/12/21  04:25    


 


Total Protein  5.7 g/dL (6.3-8.2)  L  07/12/21  04:25    


 


Albumin  3.2 g/dL (3.9-5)  L  07/12/21  04:25    


 


Albumin/Globulin Ratio  1.3 %  07/12/21  04:25    


 


Lipase  51 units/L (13-60)   07/10/21  22:43    


 


PTH Intact  176.3 pg/mL (15-65)  H  07/12/21  04:25    


 


Urine Color  Yellow  (Yellow)   07/10/21  Unknown


 


Urine Turbidity  Cloudy  (Clear)   07/10/21  Unknown


 


Urine pH  5.0  (5.0-7.0)   07/10/21  Unknown


 


Ur Specific Gravity  1.018  (1.003-1.030)   07/10/21  Unknown


 


Urine Protein  >500 mg/dL (Negative)   07/10/21  Unknown


 


Urine Glucose (UA)  50 mg/dL (Negative)   07/10/21  Unknown


 


Urine Ketones  Neg mg/dL (Negative)   07/10/21  Unknown


 


Urine Blood  Mod  (Negative)   07/10/21  Unknown


 


Urine Nitrite  Neg  (Negative)   07/10/21  Unknown


 


Urine Bilirubin  Neg  (Negative)   07/10/21  Unknown


 


Urine Urobilinogen  < 2.0 mg/dL (<2.0)   07/10/21  Unknown


 


Ur Leukocyte Esterase  Neg  (Negative)   07/10/21  Unknown


 


Urine WBC (Auto)  4.0 /HPF (0.0-6.0)   07/10/21  Unknown


 


Urine RBC (Auto)  10.0 /HPF (0.0-6.0)   07/10/21  Unknown


 


U Epithel Cells (Auto)  1.0 /HPF (0-13.0)   07/10/21  Unknown


 


Urine Mucus  Few /HPF  07/10/21  Unknown


 


Urine Creatinine  78.5 mg/dL (0.1-20.0)  H  07/11/21  15:40    


 


Protein/Creatinin Ratio  9.49   07/11/21  15:40    


 


Urine Sodium  76 mmol/L  07/11/21  15:40    


 


Urine Total Protein  745 mg/dL (5-11.8)  H  07/11/21  15:40    


 


Hepatitis A IgM Ab  Non-reactive  (NonReactive)   07/11/21  00:00    


 


Hep Bs Antigen  Non-reactive  (Negative)   07/11/21  00:00    


 


Hep B Core IgM Ab  Non-reactive  (NonReactive)   07/11/21  00:00    


 


Hepatitis C Antibody  Non-reactive  (NonReactive)   07/11/21  00:00    


 


Blood Type  O POSITIVE   07/12/21  10:53    


 


Antibody Screen  Negative   07/12/21  10:53    


 


Crossmatch  See Detail   07/12/21  10:53    











Microbiology: 


Microbiology





07/11/21 20:55   Stool   Stool Occult Blood (JENNIFER) - Final








Malave/IV: 


                                        





Voiding Method                   Urinal











Active Medications





- Current Medications


Current Medications: 














Generic Name Dose Route Start Last Admin





  Trade Name Freq  PRN Reason Stop Dose Admin


 


Acetaminophen  650 mg  07/11/21 02:17 





  Acetaminophen 325 Mg Tab  PO  





  Q6H PRN  





  Pain MILD(1-3)/Fever >100.5/HA  


 


Amlodipine Besylate  10 mg  07/12/21 13:00 





  Amlodipine 10 Mg Tab  PO  





  QDAY SOTERO  


 


Aspirin  81 mg  07/12/21 10:00  07/12/21 11:49





  Aspirin Ec 81 Mg Tab  PO   81 mg





  QDAY SOTERO   Administration


 


Carvedilol  12.5 mg  07/11/21 22:00  07/12/21 11:49





  Carvedilol 12.5 Mg Tab  PO   12.5 mg





  BID SOTERO   Administration


 


Dextrose  0 ml  07/11/21 02:17 





  Dextrose 50% In Water (25gm) 50 Ml Syringe  IV  





  Q30MIN PRN  





  Hypoglycemia  





  Protocol  


 


Furosemide  80 mg  07/12/21 13:00 





  Furosemide 40 Mg/4 Ml Inj  IV  





  DAILY SOTERO  


 


Hydralazine HCl  10 mg  07/11/21 06:13  07/11/21 10:10





  Hydralazine 20 Mg/1 Ml Inj  IV   10 mg





  Q4H PRN   Administration





  Blood Pressure  


 


Hydralazine HCl  50 mg  07/11/21 18:00  07/12/21 06:04





  Hydralazine 25 Mg Tab  PO   Not Given





  Q8HR SOTERO  


 


Sodium Chloride  100 mls @ 999 mls/hr  07/12/21 10:00 





  Nacl 0.9%  IV  





  LIBIA PRN  





  Hypotension  


 


Sodium Chloride  500 mls @ 0 mls/hr  07/12/21 10:16 





  Nacl 0.9% 500 Ml  IV  07/12/21 23:59 





  ONCE NR  





  As Directed  


 


Insulin Human Regular  0 units  07/11/21 07:30  07/12/21 11:56





  Insulin Regular, Human 100 Units/1 Ml  SUB-Q   Not Given





  [facility]  





  Protocol  


 


Magnesium Hydroxide  30 ml  07/11/21 02:17 





  Magnesium Hydroxide (Mom) Oral Liqd Udc  PO  





  Q4H PRN  





  Constipation  


 


Nitroglycerin  0.4 mg  07/13/21 06:00 





  Nitroglycerin 0.4 Mg Patch 24hr  TD  





  QDAY@0600 SOTERO  


 


Ondansetron HCl  4 mg  07/11/21 02:17 





  Ondansetron 4 Mg/2 Ml Inj  IV  





  Q8H PRN  





  Nausea And Vomiting  


 


Pantoprazole Sodium  40 mg  07/11/21 10:00  07/12/21 11:49





  Pantoprazole 40 Mg Inj  IV   40 mg





  BID SOTERO   Administration


 


Sodium Chloride  10 ml  07/11/21 10:00  07/12/21 11:49





  Sodium Chloride 0.9% 10 Ml Flush Syringe  IV   10 ml





  BID SOTERO   Administration


 


Sodium Chloride  10 ml  07/11/21 02:17 





  Sodium Chloride 0.9% 10 Ml Flush Syringe  IV  





  PRN PRN  





  LINE FLUSH  














Nutrition/Malnutrition Assess





- Dietary Evaluation


Nutrition/Malnutrition Findings: 


                                        





Nutrition Notes                                            Start:  07/11/21 

12:02


Freq:                                                      Status: Active       




Protocol:                                                                       




 Document     07/12/21 12:01  CATALINA  (Rec: 07/12/21 12:04  CATALINA  [id number])


 Nutrition Notes


     Need for Assessment generated from:         RN Referral


     Initial or Follow up                        Reassessment


     Current Diagnosis                           Acute Kidney Injury,Diabetes,


                                                 Hypertension,Heart Failure,


                                                 Hyperlipidemia


     Other Pertinent Diagnosis                   elevated LFTs, GIB, anemia


     Current Diet                                Cardiac, Consistent CHO


     Labs/Tests                                  BUN 89


                                                 Cr 7.6


     Pertinent Medications                       Reviewed


     Height                                      5 ft 5 in


     Weight                                      51.3 kg


     Ideal Body Weight (kg)                      61.81


     BMI                                         18.8


     Weight Status                               Underweight


     Subjective/Other Information                RN screen for MST. Already


                                                 following pt. He is more


                                                 confused today. He ate 50% of


                                                 breakfast and sips of ONS.


     Percent of energy/protein needs met:        71%/69%


     Burn                                        Absent


     Trauma                                      Absent


     GI Symptoms                                 None


     Current % PO                                Poor (25-49%)


     Minimum of two criteria                     Yes


     Energy Intake (non-severe)                  <75% Estimated Energy


                                                 Requirement >7 days


     Muscle Mass                                 Mild Depletion (non-severe)


     #1


      Nutrition Diagnosis                        Malnutrition


      As Evidenced by Signs and Symptoms         pt ate 50% of breakfast


      Diagnosis Progress(for reassessment        Improved


       documentation)                            


     Is patient on ventilator?                   No


     Is Patient Ambulatory and/or Out of Bed     No


     REE-([facility]-confined to bed)      [phone]


     Calculation Used for Recommendations        [facility]


     Additional Notes                            Protein: (1.2-1.5g/kg) 62-77g


                                                 Fluid: 1 ml/kcal or per MD


 Nutrition Intervention


     Change Diet Order:                          Continue


     Add Supplement/Snack (indicate name/kcal    Nepro BID


      /protein )                                 


     Provides kCal:                              850


     Provides Protein (gm)                       38


     Goal #1                                     Meet at least 80% of protein


                                                 and energy needs via PO and


                                                 ONS intakes


     Goal #2                                     Weight gain/maintenance


     Anticipated Discharge Needs:                Cardiac, Consistent CHO


     Follow-Up By:                               07/14/21


     Additional Comments                         FU for intakes, ONS tolerance











- Attestation Statement


I have reviewed and agreed w/ Malnutrition eval & tx plan: Yes

## 2021-07-12 NOTE — PROGRESS NOTE
Assessment and Plan


1. Acute kidney injury superimposed on CKD:


EDE superimposed on CKD .


CT abdomen negative for hydro.


Urine studies ordered.


Monitor renal function.


Renal prognosis is guarded to poor.


Avoid nephrotoxic agents.


Meds dosage based on GFR.


Patient require hemodialysis due to significant decline in the GFR, associated 

hyperkalemia, metabolic acidosis and volume overload.


D/w his grand daughter again today over the phone, explained the indications, 

benefits, risks and alternatives involved in hemodialysis.


She voiced understanding and gave verbal consent for hemodialysis.


Hemodialysis orders placed.





2. FEN:


Hyperkalemia, improved, monitor.


Anion-gap Metabolic acidosis, HD today, monitor.


Volume overload, UF with HD, resume IV lasix.


Monitor lytes.





3. Decompensated CHF:


Echo EF 45-50%.


Strict I/O.


On Carvedilol.


Card consulted.





4. Elevated liver enzymes:


Hepatitis serologies negative.





5. GI bleed:


Per GI EGD in the next few days, after CHF/HTN/EDE has been assessed and 

improved.





6. Hypertensive urgency:


On Carvedilol.


Resume IV Lasix.


UF with HD as tolerated.


Monitor.





7. Hypochromic Anemia, POA:


?2/2 GI bleed.


Followed by GI.


Monitor.





8. Thrombocytopenia.





9. Chronic thoraco-abdominal aneurysm.











Subjective:


Patient was seen and examined at the bedside.











Examination:


General appearance: well-developed, thin built, appears stated age, not in 

distress


HEENT: ATNC, pupils equal


Neck: supple


Respiratory: bibasal rales heard


Cardiology: regular, S1S2, no murmur


Gastrointestinal: soft, bowel sounds heard, not tender


Integumentary: warm and dry, upper chest hypopigmented patches noted


Neurologic: alert, moving extremities


Ext: no edema


Hemodialysis access: R IJ temp catheter








Subjective


Date of service: 07/12/21


Principal diagnosis: Anemia, Abnormal LFTs





Objective





- Vital Signs


Vital signs: 


                               Vital Signs - 12hr











  07/12/21 07/12/21 07/12/21





  00:30 00:40 00:50


 


Temperature   


 


Pulse Rate 70 66 68


 


Pulse Rate [   





Anterior   





Bilateral   





Throughout]   


 


Pulse Rate [   





From Monitor]   


 


Respiratory 13 13 11 L





Rate   


 


Respiratory   





Rate [Anterior   





Bilateral   





Throughout]   


 


Blood Pressure 136/80 136/80 132/79


 


O2 Sat by Pulse 98 99 100





Oximetry   














  07/12/21 07/12/21 07/12/21





  01:00 01:10 01:20


 


Temperature   


 


Pulse Rate 69 70 70


 


Pulse Rate [   





Anterior   





Bilateral   





Throughout]   


 


Pulse Rate [   





From Monitor]   


 


Respiratory 15 16 13





Rate   


 


Respiratory   





Rate [Anterior   





Bilateral   





Throughout]   


 


Blood Pressure 138/83 138/83 125/78


 


O2 Sat by Pulse 98 99 99





Oximetry   














  07/12/21 07/12/21 07/12/21





  01:30 01:40 01:50


 


Temperature   


 


Pulse Rate 64 70 69


 


Pulse Rate [   





Anterior   





Bilateral   





Throughout]   


 


Pulse Rate [   





From Monitor]   


 


Respiratory   16





Rate   


 


Respiratory   





Rate [Anterior   





Bilateral   





Throughout]   


 


Blood Pressure 120/70 120/70 137/85


 


O2 Sat by Pulse 98 97 99





Oximetry   














  07/12/21 07/12/21 07/12/21





  02:00 02:10 02:20


 


Temperature   


 


Pulse Rate 68 67 69


 


Pulse Rate [   





Anterior   





Bilateral   





Throughout]   


 


Pulse Rate [   





From Monitor]   


 


Respiratory 14 14 17





Rate   


 


Respiratory   





Rate [Anterior   





Bilateral   





Throughout]   


 


Blood Pressure 141/84 141/84 149/92


 


O2 Sat by Pulse 97 99 98





Oximetry   














  07/12/21 07/12/21 07/12/21





  02:30 02:40 02:50


 


Temperature   


 


Pulse Rate 68 67 65


 


Pulse Rate [   





Anterior   





Bilateral   





Throughout]   


 


Pulse Rate [   





From Monitor]   


 


Respiratory 15 15 14





Rate   


 


Respiratory   





Rate [Anterior   





Bilateral   





Throughout]   


 


Blood Pressure 153/93 153/93 153/95


 


O2 Sat by Pulse 97 99 98





Oximetry   














  07/12/21 07/12/21 07/12/21





  03:00 03:10 03:20


 


Temperature   


 


Pulse Rate 64 68 63


 


Pulse Rate [   





Anterior   





Bilateral   





Throughout]   


 


Pulse Rate [   





From Monitor]   


 


Respiratory 15 12 14





Rate   


 


Respiratory   





Rate [Anterior   





Bilateral   





Throughout]   


 


Blood Pressure 135/88 135/88 138/81


 


O2 Sat by Pulse 97 100 100





Oximetry   














  07/12/21 07/12/21 07/12/21





  03:25 03:30 03:40


 


Temperature 98.8 F  


 


Pulse Rate  65 62


 


Pulse Rate [   





Anterior   





Bilateral   





Throughout]   


 


Pulse Rate [   





From Monitor]   


 


Respiratory  14 14





Rate   


 


Respiratory   





Rate [Anterior   





Bilateral   





Throughout]   


 


Blood Pressure  125/77 125/77


 


O2 Sat by Pulse  93 99





Oximetry   














  07/12/21 07/12/21 07/12/21





  03:47 03:50 04:00


 


Temperature   


 


Pulse Rate   70


 


Pulse Rate [ 66  





Anterior   





Bilateral   





Throughout]   


 


Pulse Rate [   70





From Monitor]   


 


Respiratory   16





Rate   


 


Respiratory 15  





Rate [Anterior   





Bilateral   





Throughout]   


 


Blood Pressure  115/71 138/81


 


O2 Sat by Pulse  97 97





Oximetry   














  07/12/21 07/12/21 07/12/21





  04:10 04:20 04:30


 


Temperature   


 


Pulse Rate 66 70 69


 


Pulse Rate [   





Anterior   





Bilateral   





Throughout]   


 


Pulse Rate [   





From Monitor]   


 


Respiratory 14 19 17





Rate   


 


Respiratory   





Rate [Anterior   





Bilateral   





Throughout]   


 


Blood Pressure 141/88 162/94 163/98


 


O2 Sat by Pulse 99 99 98





Oximetry   














  07/12/21 07/12/21 07/12/21





  04:40 04:50 05:00


 


Temperature   


 


Pulse Rate 62 61 63


 


Pulse Rate [   





Anterior   





Bilateral   





Throughout]   


 


Pulse Rate [   





From Monitor]   


 


Respiratory 17 13 14





Rate   


 


Respiratory   





Rate [Anterior   





Bilateral   





Throughout]   


 


Blood Pressure 163/98 151/76 140/74


 


O2 Sat by Pulse 99 98 98





Oximetry   














  07/12/21 07/12/21 07/12/21





  05:10 05:20 05:30


 


Temperature   


 


Pulse Rate 66 61 61


 


Pulse Rate [   





Anterior   





Bilateral   





Throughout]   


 


Pulse Rate [   





From Monitor]   


 


Respiratory 12 15 13





Rate   


 


Respiratory   





Rate [Anterior   





Bilateral   





Throughout]   


 


Blood Pressure 140/74 135/73 123/63


 


O2 Sat by Pulse 99 99 98





Oximetry   














  07/12/21 07/12/21 07/12/21





  05:40 05:50 06:00


 


Temperature   


 


Pulse Rate 60 59 L 61


 


Pulse Rate [   





Anterior   





Bilateral   





Throughout]   


 


Pulse Rate [   





From Monitor]   


 


Respiratory 14 14 14





Rate   


 


Respiratory   





Rate [Anterior   





Bilateral   





Throughout]   


 


Blood Pressure 123/63 125/62 129/69


 


O2 Sat by Pulse 99 99 





Oximetry   














  07/12/21 07/12/21 07/12/21





  06:04 06:10 06:20


 


Temperature   


 


Pulse Rate 59 L 64 63


 


Pulse Rate [   





Anterior   





Bilateral   





Throughout]   


 


Pulse Rate [   





From Monitor]   


 


Respiratory  13 17





Rate   


 


Respiratory   





Rate [Anterior   





Bilateral   





Throughout]   


 


Blood Pressure 129/69 129/69 136/83


 


O2 Sat by Pulse  100 99





Oximetry   














  07/12/21 07/12/21 07/12/21





  06:30 06:40 06:50


 


Temperature   


 


Pulse Rate 61 70 64


 


Pulse Rate [   





Anterior   





Bilateral   





Throughout]   


 


Pulse Rate [   





From Monitor]   


 


Respiratory 14 15 12





Rate   


 


Respiratory   





Rate [Anterior   





Bilateral   





Throughout]   


 


Blood Pressure 145/80 145/80 141/90


 


O2 Sat by Pulse 98 97 98





Oximetry   














  07/12/21 07/12/21 07/12/21





  07:00 07:10 07:20


 


Temperature 97.5 F L  


 


Pulse Rate 63 63 63


 


Pulse Rate [   





Anterior   





Bilateral   





Throughout]   


 


Pulse Rate [   





From Monitor]   


 


Respiratory 12 13 12





Rate   


 


Respiratory   





Rate [Anterior   





Bilateral   





Throughout]   


 


Blood Pressure 144/80 144/80 143/85


 


O2 Sat by Pulse 97 99 99





Oximetry   














  07/12/21 07/12/21 07/12/21





  07:30 07:40 07:50


 


Temperature   


 


Pulse Rate 63 67 69


 


Pulse Rate [   





Anterior   





Bilateral   





Throughout]   


 


Pulse Rate [   





From Monitor]   


 


Respiratory 12 17 16





Rate   


 


Respiratory   





Rate [Anterior   





Bilateral   





Throughout]   


 


Blood Pressure 151/85 151/85 184/103


 


O2 Sat by Pulse 98 97 96





Oximetry   














  07/12/21 07/12/21 07/12/21





  07:52 08:00 08:10


 


Temperature   


 


Pulse Rate  69 65


 


Pulse Rate [ 69  





Anterior   





Bilateral   





Throughout]   


 


Pulse Rate [  74 





From Monitor]   


 


Respiratory  13 15





Rate   


 


Respiratory 20  





Rate [Anterior   





Bilateral   





Throughout]   


 


Blood Pressure  184/103 152/79


 


O2 Sat by Pulse 95 97 98





Oximetry   














  07/12/21 07/12/21 07/12/21





  08:20 08:30 08:40


 


Temperature   


 


Pulse Rate 64 64 68


 


Pulse Rate [   





Anterior   





Bilateral   





Throughout]   


 


Pulse Rate [   





From Monitor]   


 


Respiratory 15 13 15





Rate   


 


Respiratory   





Rate [Anterior   





Bilateral   





Throughout]   


 


Blood Pressure 160/92 152/88 175/100


 


O2 Sat by Pulse 98 99 94





Oximetry   














  07/12/21 07/12/21 07/12/21





  08:50 09:00 09:10


 


Temperature   


 


Pulse Rate 70 68 72


 


Pulse Rate [   





Anterior   





Bilateral   





Throughout]   


 


Pulse Rate [   





From Monitor]   


 


Respiratory 14 17 17





Rate   


 


Respiratory   





Rate [Anterior   





Bilateral   





Throughout]   


 


Blood Pressure 171/98 188/107 175/113


 


O2 Sat by Pulse 99 99 95





Oximetry   














  07/12/21 07/12/21 07/12/21





  09:20 09:30 09:40


 


Temperature   


 


Pulse Rate 72 68 70


 


Pulse Rate [   





Anterior   





Bilateral   





Throughout]   


 


Pulse Rate [   





From Monitor]   


 


Respiratory   





Rate   


 


Respiratory   





Rate [Anterior   





Bilateral   





Throughout]   


 


Blood Pressure 176/107 168/95 163/91


 


O2 Sat by Pulse 93 93 91





Oximetry   














  07/12/21 07/12/21 07/12/21





  09:50 10:00 10:10


 


Temperature   


 


Pulse Rate 69 70 69


 


Pulse Rate [   





Anterior   





Bilateral   





Throughout]   


 


Pulse Rate [   





From Monitor]   


 


Respiratory   





Rate   


 


Respiratory   





Rate [Anterior   





Bilateral   





Throughout]   


 


Blood Pressure 150/87 135/83 148/78


 


O2 Sat by Pulse 92 88 90





Oximetry   














  07/12/21 07/12/21 07/12/21





  10:20 10:30 10:40


 


Temperature   


 


Pulse Rate 69 67 68


 


Pulse Rate [   





Anterior   





Bilateral   





Throughout]   


 


Pulse Rate [   





From Monitor]   


 


Respiratory  14 14





Rate   


 


Respiratory   





Rate [Anterior   





Bilateral   





Throughout]   


 


Blood Pressure 128/87 147/90 159/88


 


O2 Sat by Pulse 93 96 92





Oximetry   














  07/12/21 07/12/21 07/12/21





  10:50 11:00 11:10


 


Temperature   


 


Pulse Rate 68 70 69


 


Pulse Rate [   





Anterior   





Bilateral   





Throughout]   


 


Pulse Rate [   





From Monitor]   


 


Respiratory 14 16 17





Rate   


 


Respiratory   





Rate [Anterior   





Bilateral   





Throughout]   


 


Blood Pressure 156/94 173/101 143/96


 


O2 Sat by Pulse  93 94





Oximetry   














  07/12/21 07/12/21 07/12/21





  11:20 11:30 11:40


 


Temperature  97.7 F 


 


Pulse Rate 69 68 69


 


Pulse Rate [   





Anterior   





Bilateral   





Throughout]   


 


Pulse Rate [   





From Monitor]   


 


Respiratory 12 12 13





Rate   


 


Respiratory   





Rate [Anterior   





Bilateral   





Throughout]   


 


Blood Pressure 167/103 170/98 164/94


 


O2 Sat by Pulse 95 96 96





Oximetry   














- Lab





                                 07/12/21 04:25





                                 07/12/21 04:25


                             Most recent lab results











ABG pH  7.384 pH Units (7.350-7.450)   07/11/21  14:40    


 


ABG pCO2  32.3 mm Hg  07/11/21  14:40    


 


ABG pO2  107.8 mm Hg (80.0-90.0)  H  07/11/21  14:40    


 


ABG HCO3  18.8 mmol/L (20.0-26.0)  L  07/11/21  14:40    


 


ABG O2 Saturation  97.9 % (95.0-99.0)   07/11/21  14:40    


 


Calcium  8.6 mg/dL (8.4-10.2)   07/12/21  04:25    


 


Phosphorus  8.20 mg/dL (2.5-4.5)  H  07/12/21  04:25    


 


Urine Creatinine  78.5 mg/dL (0.1-20.0)  H  07/11/21  15:40    


 


Urine Sodium  76 mmol/L  07/11/21  15:40    


 


Urine Total Protein  745 mg/dL (5-11.8)  H  07/11/21  15:40    














Medications & Allergies





- Medications


Allergies/Adverse Reactions: 


                                    Allergies





No Known Allergies Allergy (Unverified 01/17/15 01:13)


   








Home Medications: 


                                Home Medications











 Medication  Instructions  Recorded  Confirmed  Last Taken  Type


 


Amlodipine Besylate [Norvasc] 10 mg PO QDAY 07/10/21 07/10/21 Unknown History


 


Aspirin EC [Halfprin EC] 81 mg PO QDAY 07/10/21 07/10/21 Unknown History


 


Atorvastatin Calcium [Lipitor] 80 mg PO QDAY 07/10/21 07/10/21 Unknown History


 


Lisinopril [Zestril] 10 mg PO QDAY 07/10/21 07/10/21 Unknown History











Active Medications: 














Generic Name Dose Route Start Last Admin





  Trade Name Freq  PRN Reason Stop Dose Admin


 


Acetaminophen  650 mg  07/11/21 02:17 





  Acetaminophen 325 Mg Tab  PO  





  Q6H PRN  





  Pain MILD(1-3)/Fever >100.5/HA  


 


Aspirin  81 mg  07/12/21 10:00  07/12/21 11:49





  Aspirin Ec 81 Mg Tab  PO   81 mg





  QDAY SOTERO   Administration


 


Carvedilol  12.5 mg  07/11/21 22:00  07/12/21 11:49





  Carvedilol 12.5 Mg Tab  PO   12.5 mg





  BID SOTERO   Administration


 


Dextrose  0 ml  07/11/21 02:17 





  Dextrose 50% In Water (25gm) 50 Ml Syringe  IV  





  Q30MIN PRN  





  Hypoglycemia  





  Protocol  


 


Hydralazine HCl  10 mg  07/11/21 06:13  07/11/21 10:10





  Hydralazine 20 Mg/1 Ml Inj  IV   10 mg





  Q4H PRN   Administration





  Blood Pressure  


 


Hydralazine HCl  50 mg  07/11/21 18:00  07/12/21 06:04





  Hydralazine 25 Mg Tab  PO   Not Given





  Q8HR [facility]  


 


Sodium Chloride  100 mls @ 999 mls/hr  07/12/21 10:00 





  Nacl 0.9%  IV  





  LIBIA PRN  





  Hypotension  


 


Sodium Chloride  500 mls @ 0 mls/hr  07/12/21 10:16 





  Nacl 0.9% 500 Ml  IV  07/12/21 23:59 





  ONCE NR  





  As Directed  


 


Insulin Human Regular  0 units  07/11/21 07:30  07/12/21 11:56





  Insulin Regular, Human 100 Units/1 Ml  SUB-Q   Not Given





  [facility]  





  Protocol  


 


Magnesium Hydroxide  30 ml  07/11/21 02:17 





  Magnesium Hydroxide (Mom) Oral Liqd Udc  PO  





  Q4H PRN  





  Constipation  


 


Ondansetron HCl  4 mg  07/11/21 02:17 





  Ondansetron 4 Mg/2 Ml Inj  IV  





  Q8H PRN  





  Nausea And Vomiting  


 


Pantoprazole Sodium  40 mg  07/11/21 10:00  07/12/21 11:49





  Pantoprazole 40 Mg Inj  IV   40 mg





  BID SOTERO   Administration


 


Sodium Chloride  10 ml  07/11/21 10:00  07/12/21 11:49





  Sodium Chloride 0.9% 10 Ml Flush Syringe  IV   10 ml





  BID SOTERO   Administration


 


Sodium Chloride  10 ml  07/11/21 02:17 





  Sodium Chloride 0.9% 10 Ml Flush Syringe  IV  





  PRN PRN  





  LINE FLUSH

## 2021-07-12 NOTE — CONSULTATION
History of Present Illness


Consult date: 07/12/21


Consult reason: pre op evaluation


History of present illness: 





The patient is an 80-year-old man who presented to the hospital 2 days ago with 

constitutional symptoms of weight loss, weakness and failure to thrive.  On 

presentation there were multiple, severe laboratory abnormalities.  There was 

end-stage renal failure with a creatinine of 7, increased from a baseline of 

1.72 years ago.  There was severe anemia with a hematocrit in the hospital that 

has ranged from 22-26.  The patient had GI evaluation, and is planned for GI en

doscopy.  A cardiac consultation is requested for preoperative assessment.





The patient currently is in the ICU, breathing comfortably on room air but 

appears frail, lethargic and chronically ill.  Patient is unable to give a 

history.  History obtained from the chart suggest a history of coronary artery 

disease.  On the chest x-ray there are sternal wires that might suggest a prior 

coronary artery bypass surgery.  A history of aortic dissection repair has also 

been listed in the chart.  Comorbidities include chronic hypertension.





A 12-lead EKG has not been obtained, but on the telemetry monitor patient has 

been in a stable sinus rhythm.  The chest x-ray shows evidence of COPD and 

possible, mild interstitial edema.  Echocardiogram done on this presentation 

shows very mild left ventricular dysfunction, ejection fraction 45 to 50%.





Past History


Past Medical History: diabetes, hypertension, hyperlipidemia


Past Surgical History: Other (Aortic dissection repair, colonoscopy over 15 

years ago)


Social history: smoking (Former smoker).  denies: alcohol abuse


Family history: no significant family history





Medications and Allergies


                                    Allergies











Allergy/AdvReac Type Severity Reaction Status Date / Time


 


No Known Allergies Allergy   Unverified 01/17/15 01:13











                                Home Medications











 Medication  Instructions  Recorded  Confirmed  Last Taken  Type


 


Amlodipine Besylate [Norvasc] 10 mg PO QDAY 07/10/21 07/10/21 Unknown History


 


Aspirin EC [Halfprin EC] 81 mg PO QDAY 07/10/21 07/10/21 Unknown History


 


Atorvastatin Calcium [Lipitor] 80 mg PO QDAY 07/10/21 07/10/21 Unknown History


 


Lisinopril [Zestril] 10 mg PO QDAY 07/10/21 07/10/21 Unknown History











Active Meds: 


Active Medications





Acetaminophen (Acetaminophen 325 Mg Tab)  650 mg PO Q6H PRN


   PRN Reason: Pain MILD(1-3)/Fever >100.5/HA


Aspirin (Aspirin Ec 81 Mg Tab)  81 mg PO QDAY [facility]


   Last Admin: 07/12/21 11:49 Dose:  81 mg


   Documented by: 


Carvedilol (Carvedilol 12.5 Mg Tab)  12.5 mg PO BID [facility]


   Last Admin: 07/12/21 11:49 Dose:  12.5 mg


   Documented by: 


Dextrose (Dextrose 50% In Water (25gm) 50 Ml Syringe)  0 ml IV Q30MIN PRN; 

Protocol


   PRN Reason: Hypoglycemia


Hydralazine HCl (Hydralazine 20 Mg/1 Ml Inj)  10 mg IV Q4H PRN


   PRN Reason: Blood Pressure


   Last Admin: 07/11/21 10:10 Dose:  10 mg


   Documented by: 


Hydralazine HCl (Hydralazine 25 Mg Tab)  50 mg PO Q8HR [facility]


   Last Admin: 07/12/21 06:04 Dose:  Not Given


   Documented by: 


Sodium Chloride (Nacl 0.9%)  100 mls @ 999 mls/hr IV LIBIA PRN


   PRN Reason: Hypotension


Sodium Chloride (Nacl 0.9% 500 Ml)  500 mls @ 0 mls/hr IV ONCE NR


   Stop: 07/12/21 23:59


Insulin Human Regular (Insulin Regular, Human 100 Units/1 Ml)  0 units SUB-Q 

[facility]; Protocol


   Last Admin: 07/12/21 11:56 Dose:  Not Given


   Documented by: 


Magnesium Hydroxide (Magnesium Hydroxide (Mom) Oral Liqd Udc)  30 ml PO Q4H PRN


   PRN Reason: Constipation


Ondansetron HCl (Ondansetron 4 Mg/2 Ml Inj)  4 mg IV Q8H PRN


   PRN Reason: Nausea And Vomiting


Pantoprazole Sodium (Pantoprazole 40 Mg Inj)  40 mg IV BID [facility]


   Last Admin: 07/12/21 11:49 Dose:  40 mg


   Documented by: 


Sodium Chloride (Sodium Chloride 0.9% 10 Ml Flush Syringe)  10 ml IV BID [facility]


   Last Admin: 07/12/21 11:49 Dose:  10 ml


   Documented by: 


Sodium Chloride (Sodium Chloride 0.9% 10 Ml Flush Syringe)  10 ml IV PRN PRN


   PRN Reason: LINE FLUSH











Review of Systems


ROS unobtainable: due to mental status





Physical Examination


                                   Vital Signs











Temp Pulse Resp BP Pulse Ox


 


 97.7 F   109 H  16   198/137   96 


 


 07/10/21 22:22  07/10/21 22:22  07/10/21 22:22  07/10/21 22:22  07/10/21 22:22











General appearance: cachectic, other (Patient is lethargic, frail and 

chronically ill-appearing)


HEENT: Positive: PERRL


Neck: Positive: neck supple


Cardiac: Positive: Reg Rate and Rhythm


Lungs: Positive: Decreased Breath Sounds


Neuro: Positive: Weakness (Generalized weakness, frail and chronically ill appea

ring)


Abdomen: Positive: Soft


Male genitourinary: Positive: deferred


Skin: Positive: Clear


Extremities: Absent: normal





Results





                                 07/12/21 04:25





                                 07/12/21 04:25


                                 Cardiac Enzymes











  07/12/21 Range/Units





  04:25 


 


AST  502 H  (5-40)  units/L








                                       CBC











  07/12/21 Range/Units





  04:25 


 


WBC  8.1  (4.5-11.0)  K/mm3


 


RBC  3.50 L  (3.65-5.03)  M/mm3


 


Hgb  7.3 L  (11.8-15.2)  gm/dl


 


Hct  22.8 L  (35.5-45.6)  %








                          Comprehensive Metabolic Panel











  07/11/21 07/12/21 Range/Units





  21:48 04:25 


 


Sodium   141  (137-145)  mmol/L


 


Potassium  4.8  4.9  (3.6-5.0)  mmol/L


 


Chloride   103.1  ()  mmol/L


 


Carbon Dioxide   20 L  (22-30)  mmol/L


 


BUN   89 H  (9-20)  mg/dL


 


Creatinine   7.6 H  (0.8-1.3)  mg/dL


 


Glucose   129 H  ()  mg/dL


 


Calcium   8.6  (8.4-10.2)  mg/dL


 


AST   502 H  (5-40)  units/L


 


ALT   636 H  (7-56)  units/L


 


Alkaline Phosphatase   101  ()  units/L


 


Total Protein   5.7 L  (6.3-8.2)  g/dL


 


Albumin   3.2 L  (3.9-5)  g/dL














EKG interpretations





- Telemetry


EKG Rhythm: Sinus Rhythm (Twelve-lead EKG is pending, but patient is in a stable

sinus rhythm on telemetry monitor)





Assessment and Plan





- Patient Problems


(1) Preoperative cardiovascular examination


Current Visit: Yes   Status: Acute   


Plan to address problem: 


Frail, elderly, 80-year-old man who presents with severe anemia and end-stage 

renal failure.  No cardiac complaints, but he apparently has an extensive 

cardiac history and possible prior coronary artery bypass.  Chest x-ray on 

presentation showed evidence of COPD and possible mild interstitial edema.  Left

ventricular ejection fraction is relatively well preserved at 45 to 50%.





The patient is small to moderate perioperative risk for myocardial infarction 

and heart failure exacerbation.  Due to his multiple severe comorbidities, he is

not a candidate for aggressive and invasive cardiac evaluation and management.  

We will pursue a conservative cardiac management approach.





His cardiac risk for noncardiac surgery is moderate, you may proceed with GI 

endoscopy.  We will optimize cardiac management as tolerated with long-acting 

nitrates, beta-blockers and diuretics.  Patient is currently a poor candidate 

for antiplatelet or anticoagulant therapy due to underlying severe anemia.

## 2021-07-12 NOTE — XRAY REPORT
CHEST 1 VIEW 7/12/2021 8:33 AM



INDICATION / CLINICAL INFORMATION: right ij vas cath placement.



COMPARISON: 7/11/2021



FINDINGS:



SUPPORT DEVICES: Right IJ central venous catheter has been placed with the tip projecting over the morin
perior cavoatrial junction.



HEART / MEDIASTINUM: Stable. 



LUNGS / PLEURA: Stable small bilateral pleural effusions and patchy bilateral pulmonary opacities. No
 pneumothorax. 



ADDITIONAL FINDINGS: No significant additional findings.



IMPRESSION:

1. Right IJ central venous catheter appears appropriately positioned.



Signer Name: Srinivasan Rodriguez MD 

Signed: 7/12/2021 8:44 AM

Workstation Name: [id number]

## 2021-07-12 NOTE — GASTROENTEROLOGY PROGRESS NOTE
Assessment and Plan





- Patient Problems


(1) CHF (congestive heart failure), NYHA class III


Current Visit: Yes   Status: Acute   


Plan to address problem: 


- Cards eval/TTE results noted.  Moderate disease, and hopefully will improve 

after fluid removal with HD.








(2) Acute kidney injury superimposed on chronic kidney disease


Current Visit: Yes   Status: Acute   





(3) Elevated liver enzymes


Current Visit: Yes   Status: Acute   


Plan to address problem: 


- Hepatitis serologies negative.


- No gross mass on CT, but this would be highest on differential.  With EDE, and

(?) new atorvastatin, would consider statin liver injury as well.


- Will get a RUQ US; if not diagnostic, would consider non-contrast MRI.








(4) GI bleed


Current Visit: Yes   Status: Acute   


Plan to address problem: 


- Continue current protonix.


- Will need EGD (and possibly colonoscopy); assuming HD goes well today, will 

plan EGD tomorrow.


- OK to continue cardiac ASA, but would avoid other blood thinners at present.








(5) Hypertensive urgency


Current Visit: Yes   Status: Acute   





Subjective


Date of service: 07/12/21


Principal diagnosis: Anemia, Abnormal LFTs


Interval history: 





The patient has been stable overnight, and has had no melena or hematemesis.  He

has rec'd a Vascath, and HD will start today.  He appears to have abdominal pain

on palpation, but is in no distress.  No fevers overnight.





Objective





- Constitutional


Vitals: 


                                        











Temp Pulse Resp BP Pulse Ox


 


 97.5 F L  72   17   175/113   95 


 


 07/12/21 07:00  07/12/21 09:10  07/12/21 09:10  07/12/21 09:10  07/12/21 09:10











General appearance: mild distress





- Respiratory


Respiratory effort: labored


Respiratory: bilateral: CTA





- Cardiovascular


Rhythm: regular


Heart Sounds: Present: S1 & S2, systolic murmur





- Gastrointestinal


General gastrointestinal: Present: soft, tender (Mild diffuse tenderness without

guarding), non-distended





- Labs


CBC & Chem 7: 


                                 07/12/21 04:25





                                 07/12/21 04:25


Labs: 


                         Laboratory Results - last 24 hr











  07/11/21 07/11/21 07/11/21





  08:23 08:23 11:33


 


WBC   


 


RBC   


 


Hgb   


 


Hct   


 


MCV   


 


MCH   


 


MCHC   


 


RDW   


 


Add Manual Diff  Complete  


 


Total Counted  100  


 


Lymphocytes % (Manual)  1.0 L  


 


Monocytes % (Manual)  2.0  


 


Nucleated RBC %  Not Reportable  


 


Seg Neutrophils # Man  7.2  


 


Band Neutrophils #  0.0  


 


Lymphocytes # (Manual)  0.1 L  


 


Abs React Lymphs (Man)  0.0  


 


Monocytes # (Manual)  0.1  


 


Eosinophils # (Manual)  0.0  


 


Basophils # (Manual)  0.0  


 


Metamyelocytes #  0.0  


 


Myelocytes #  0.0  


 


Promyelocytes #  0.0  


 


Blast Cells #  0.0  


 


WBC Morphology  Not Reportable  TNR 


 


Hypersegmented Neuts  Not Reportable  


 


Hyposegmented Neuts  Not Reportable  


 


Hypogranular Neuts  Not Reportable  


 


Smudge Cells  Not Reportable  


 


Toxic Granulation  Not Reportable  


 


Toxic Vacuolation  Not Reportable  


 


Dohle Bodies  Not Reportable  


 


Pelger-Huet Anomaly  Not Reportable  


 


Curt Rods  Not Reportable  


 


Platelet Estimate  Consistent w auto  


 


Clumped Platelets  Not Reportable  


 


Plt Clumps, EDTA  Not Reportable  


 


Large Platelets  Not Reportable  


 


Giant Platelets  Not Reportable  


 


Platelet Satelliting  Not Reportable  


 


Plt Morphology Comment  Not Reportable  


 


RBC Morphology  Not Reportable  


 


Dimorphic RBCs  Not Reportable  


 


Polychromasia  Not Reportable  


 


Hypochromasia  Few  


 


Poikilocytosis  Not Reportable  


 


Anisocytosis  Not Reportable  


 


Microcytosis  Not Reportable  


 


Macrocytosis  Not Reportable  


 


Spherocytes  Not Reportable  


 


Pappenheimer Bodies  Not Reportable  


 


Sickle Cells  Not Reportable  


 


Target Cells  Not Reportable  


 


Tear Drop Cells  Not Reportable  


 


Ovalocytes  Not Reportable  


 


Helmet Cells  Not Reportable  


 


Negrete-[place] Bodies  Not Reportable  


 


Cabot Rings  Not Reportable  


 


Michelet Cells  Few  


 


Bite Cells  Not Reportable  


 


Crenated Cell  Not Reportable  


 


Elliptocytes  Few  


 


Acanthocytes (Spur)  Not Reportable  


 


Rouleaux  Not Reportable  


 


Hemoglobin C Crystals  Not Reportable  


 


Schistocytes  Rare  


 


Malaria parasites  Not Reportable  


 


Bryan Bodies  Not Reportable  


 


Hem Pathologist Commnt  No  


 


ABG pH   


 


ABG pCO2   


 


ABG pO2   


 


ABG HCO3   


 


ABG O2 Saturation   


 


ABG O2 Content   


 


ABG Base Excess   


 


ABG Hemoglobin   


 


ABG Carboxyhemoglobin   


 


ABG Methemoglobin   


 


Oxyhemoglobin   


 


FiO2   


 


Sodium   


 


Potassium   


 


Chloride   


 


Carbon Dioxide   


 


Anion Gap   


 


BUN   


 


Creatinine   


 


Estimated GFR   


 


BUN/Creatinine Ratio   


 


Glucose   


 


POC Glucose    155 H


 


Calcium   


 


Phosphorus   


 


Total Bilirubin   


 


AST   


 


ALT   


 


Alkaline Phosphatase   


 


Total Protein   


 


Albumin   


 


Albumin/Globulin Ratio   


 


PTH Intact   


 


Urine Creatinine   


 


Protein/Creatinin Ratio   


 


Urine Sodium   


 


Urine Total Protein   














  07/11/21 07/11/21 07/11/21





  14:40 15:40 16:46


 


WBC   


 


RBC   


 


Hgb   


 


Hct   


 


MCV   


 


MCH   


 


MCHC   


 


RDW   


 


Add Manual Diff   


 


Total Counted   


 


Lymphocytes % (Manual)   


 


Monocytes % (Manual)   


 


Nucleated RBC %   


 


Seg Neutrophils # Man   


 


Band Neutrophils #   


 


Lymphocytes # (Manual)   


 


Abs React Lymphs (Man)   


 


Monocytes # (Manual)   


 


Eosinophils # (Manual)   


 


Basophils # (Manual)   


 


Metamyelocytes #   


 


Myelocytes #   


 


Promyelocytes #   


 


Blast Cells #   


 


WBC Morphology   


 


Hypersegmented Neuts   


 


Hyposegmented Neuts   


 


Hypogranular Neuts   


 


Smudge Cells   


 


Toxic Granulation   


 


Toxic Vacuolation   


 


Dohle Bodies   


 


Pelger-Huet Anomaly   


 


Curt Rods   


 


Platelet Estimate   


 


Clumped Platelets   


 


Plt Clumps, EDTA   


 


Large Platelets   


 


Giant Platelets   


 


Platelet Satelliting   


 


Plt Morphology Comment   


 


RBC Morphology   


 


Dimorphic RBCs   


 


Polychromasia   


 


Hypochromasia   


 


Poikilocytosis   


 


Anisocytosis   


 


Microcytosis   


 


Macrocytosis   


 


Spherocytes   


 


Pappenheimer Bodies   


 


Sickle Cells   


 


Target Cells   


 


Tear Drop Cells   


 


Ovalocytes   


 


Helmet Cells   


 


Negrete-Northvale Bodies   


 


Cabot Rings   


 


Solon Cells   


 


Bite Cells   


 


Crenated Cell   


 


Elliptocytes   


 


Acanthocytes (Spur)   


 


Rouleaux   


 


Hemoglobin C Crystals   


 


Schistocytes   


 


Malaria parasites   


 


Bryan Bodies   


 


Hem Pathologist Commnt   


 


ABG pH  7.384  


 


ABG pCO2  32.3  


 


ABG pO2  107.8 H  


 


ABG HCO3  18.8 L  


 


ABG O2 Saturation  97.9  


 


ABG O2 Content  10.3  


 


ABG Base Excess  -5.6 L  


 


ABG Hemoglobin  7.4 L  


 


ABG Carboxyhemoglobin  1.5  


 


ABG Methemoglobin  0.5  


 


Oxyhemoglobin  95.9  


 


FiO2  28  


 


Sodium   


 


Potassium   


 


Chloride   


 


Carbon Dioxide   


 


Anion Gap   


 


BUN   


 


Creatinine   


 


Estimated GFR   


 


BUN/Creatinine Ratio   


 


Glucose   


 


POC Glucose    158 H


 


Calcium   


 


Phosphorus   


 


Total Bilirubin   


 


AST   


 


ALT   


 


Alkaline Phosphatase   


 


Total Protein   


 


Albumin   


 


Albumin/Globulin Ratio   


 


PTH Intact   


 


Urine Creatinine   78.5 H 


 


Protein/Creatinin Ratio   9.49 


 


Urine Sodium   76 


 


Urine Total Protein   745 H 














  07/11/21 07/11/21 07/12/21





  21:29 21:48 04:25


 


WBC    8.1


 


RBC    3.50 L


 


Hgb    7.3 L


 


Hct    22.8 L


 


MCV    65 L


 


MCH    21 L


 


MCHC    32


 


RDW    17.9 H


 


Add Manual Diff   


 


Total Counted   


 


Lymphocytes % (Manual)   


 


Monocytes % (Manual)   


 


Nucleated RBC %   


 


Seg Neutrophils # Man   


 


Band Neutrophils #   


 


Lymphocytes # (Manual)   


 


Abs React Lymphs (Man)   


 


Monocytes # (Manual)   


 


Eosinophils # (Manual)   


 


Basophils # (Manual)   


 


Metamyelocytes #   


 


Myelocytes #   


 


Promyelocytes #   


 


Blast Cells #   


 


WBC Morphology   


 


Hypersegmented Neuts   


 


Hyposegmented Neuts   


 


Hypogranular Neuts   


 


Smudge Cells   


 


Toxic Granulation   


 


Toxic Vacuolation   


 


Dohle Bodies   


 


Pelger-Huet Anomaly   


 


Curt Rods   


 


Platelet Estimate   


 


Clumped Platelets   


 


Plt Clumps, EDTA   


 


Large Platelets   


 


Giant Platelets   


 


Platelet Satelliting   


 


Plt Morphology Comment   


 


RBC Morphology   


 


Dimorphic RBCs   


 


Polychromasia   


 


Hypochromasia   


 


Poikilocytosis   


 


Anisocytosis   


 


Microcytosis   


 


Macrocytosis   


 


Spherocytes   


 


Pappenheimer Bodies   


 


Sickle Cells   


 


Target Cells   


 


Tear Drop Cells   


 


Ovalocytes   


 


Helmet Cells   


 


Negrete-Northvale Bodies   


 


Cabot Rings   


 


Michelet Cells   


 


Bite Cells   


 


Crenated Cell   


 


Elliptocytes   


 


Acanthocytes (Spur)   


 


Rouleaux   


 


Hemoglobin C Crystals   


 


Schistocytes   


 


Malaria parasites   


 


Bryan Bodies   


 


Hem Pathologist Commnt   


 


ABG pH   


 


ABG pCO2   


 


ABG pO2   


 


ABG HCO3   


 


ABG O2 Saturation   


 


ABG O2 Content   


 


ABG Base Excess   


 


ABG Hemoglobin   


 


ABG Carboxyhemoglobin   


 


ABG Methemoglobin   


 


Oxyhemoglobin   


 


FiO2   


 


Sodium   


 


Potassium   4.8 


 


Chloride   


 


Carbon Dioxide   


 


Anion Gap   


 


BUN   


 


Creatinine   


 


Estimated GFR   


 


BUN/Creatinine Ratio   


 


Glucose   


 


POC Glucose  138 H  


 


Calcium   


 


Phosphorus   


 


Total Bilirubin   


 


AST   


 


ALT   


 


Alkaline Phosphatase   


 


Total Protein   


 


Albumin   


 


Albumin/Globulin Ratio   


 


PTH Intact   


 


Urine Creatinine   


 


Protein/Creatinin Ratio   


 


Urine Sodium   


 


Urine Total Protein   














  07/12/21 07/12/21 07/12/21





  04:25 04:25 07:21


 


WBC   


 


RBC   


 


Hgb   


 


Hct   


 


MCV   


 


MCH   


 


MCHC   


 


RDW   


 


Add Manual Diff   


 


Total Counted   


 


Lymphocytes % (Manual)   


 


Monocytes % (Manual)   


 


Nucleated RBC %   


 


Seg Neutrophils # Man   


 


Band Neutrophils #   


 


Lymphocytes # (Manual)   


 


Abs React Lymphs (Man)   


 


Monocytes # (Manual)   


 


Eosinophils # (Manual)   


 


Basophils # (Manual)   


 


Metamyelocytes #   


 


Myelocytes #   


 


Promyelocytes #   


 


Blast Cells #   


 


WBC Morphology   


 


Hypersegmented Neuts   


 


Hyposegmented Neuts   


 


Hypogranular Neuts   


 


Smudge Cells   


 


Toxic Granulation   


 


Toxic Vacuolation   


 


Dohle Bodies   


 


Pelger-Huet Anomaly   


 


Curt Rods   


 


Platelet Estimate   


 


Clumped Platelets   


 


Plt Clumps, EDTA   


 


Large Platelets   


 


Giant Platelets   


 


Platelet Satelliting   


 


Plt Morphology Comment   


 


RBC Morphology   


 


Dimorphic RBCs   


 


Polychromasia   


 


Hypochromasia   


 


Poikilocytosis   


 


Anisocytosis   


 


Microcytosis   


 


Macrocytosis   


 


Spherocytes   


 


Pappenheimer Bodies   


 


Sickle Cells   


 


Target Cells   


 


Tear Drop Cells   


 


Ovalocytes   


 


Helmet Cells   


 


Negrete-Northvale Bodies   


 


Cabot Rings   


 


Michelet Cells   


 


Bite Cells   


 


Crenated Cell   


 


Elliptocytes   


 


Acanthocytes (Spur)   


 


Rouleaux   


 


Hemoglobin C Crystals   


 


Schistocytes   


 


Malaria parasites   


 


Bryan Bodies   


 


Hem Pathologist Commnt   


 


ABG pH   


 


ABG pCO2   


 


ABG pO2   


 


ABG HCO3   


 


ABG O2 Saturation   


 


ABG O2 Content   


 


ABG Base Excess   


 


ABG Hemoglobin   


 


ABG Carboxyhemoglobin   


 


ABG Methemoglobin   


 


Oxyhemoglobin   


 


FiO2   


 


Sodium  141  


 


Potassium  4.9  


 


Chloride  103.1  


 


Carbon Dioxide  20 L  


 


Anion Gap  23  


 


BUN  89 H  


 


Creatinine  7.6 H  


 


Estimated GFR  7  


 


BUN/Creatinine Ratio  12  


 


Glucose  129 H  


 


POC Glucose    120 H


 


Calcium  8.6  


 


Phosphorus  8.20 H  


 


Total Bilirubin  0.70  


 


AST  502 H  


 


ALT  636 H  


 


Alkaline Phosphatase  101  


 


Total Protein  5.7 L  


 


Albumin  3.2 L  


 


Albumin/Globulin Ratio  1.3  


 


PTH Intact   176.3 H 


 


Urine Creatinine   


 


Protein/Creatinin Ratio   


 


Urine Sodium   


 


Urine Total Protein

## 2021-07-12 NOTE — ULTRASOUND REPORT
ULTRASOUND ABDOMEN, COMPLETE



INDICATION / CLINICAL INFORMATION: Elevated liver enzymes.



COMPARISON: CT abdomen/pelvis without contrast 7/11/2021.



FINDINGS:

PANCREAS: Not well visualized.

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
m, measuring up to 3.3 cm.

IVC: No significant abnormality.

LIVER: The liver measures small at 9.8 cm. Generalized mildly increased hepatic echotexture is noted.
 No focal hepatic lesion is identified. Normal hepatopedal blood flow within the main portal vein.

GALLBLADDER: No significant abnormality. 

BILE DUCTS: No significant abnormality. Common bile duct measures 2 mm. 

KIDNEYS: Right: The right kidney measures 8.1 cm. Increased cortical echogenicity.  Left: The left ki
dney measures 9.8 cm. Increased cortical echogenicity. There is a simple appearing cyst measuring 1.7
 cm within the midpole.

SPLEEN: The spleen measures 7.8 cm. No significant abnormality.



FREE FLUID: None.

ADDITIONAL FINDINGS: None.



IMPRESSION:

1. Mildly increased hepatic echotexture, most commonly seen in the setting of steatosis.  

2. Increased renal cortical echogenicity bilaterally, ultimately nonspecific but can be seen with med
ical renal disease.

3. Additional findings as above.



Scribed by: Shawnee Contreras RDMS, LYN 

Scribed: 7/12/2021 1:41 PM 



 I have reviewed the images, agree with this report, and edited this report as needed.



Signer Name: Michael Flower MD 

Signed: 7/12/2021 4:28 PM

Workstation Name: [id number]

## 2021-07-12 NOTE — PROCEDURE NOTE
Date of procedure: 07/12/21


Pre-op diagnosis: Acute renal failure requiring HD; Acute metabolic 

encephalopathy


Post-op diagnosis: same


Procedure: 





RIJ Trialysis catheter under ultrasound guidance





-Consent was obtained from the granddaughter and also discussed with the patient

using a translation line.


-Universal precautions addressed.


-Time out done.


-Full sterile barrier technique- cap, gown, mask adn full body drape.


-IV Midazolam 1mg given.





-Patietn was positioned.


-Cleaned and draped in sterile fashion. The RIJ was identified using the USS, 

and local lidocaine infiltrated.


The RIJ was cannulated, on the first attempt. dark blood aspirated.


A guidewire was placed, and position of the guidewire  position within the vein 

confirmed with ultrasound.


A stab wound was created, the vein dilated.


A triple lumen temporary HD catheter was placed using Seldinger technique.


Guidewire was removed. All three port were aspirating dark blood without 

difficulty.


Catheter was sutured in place and a sterile dressing placed.


Patient tolerated the procedure well.


no immediate complications.


Post procedure CXR was ordered.





Anesthesia: other (Midazolam 1mg IV, Local lidocaine injection)


Surgeon: KAITY KHOURY


Estimated blood loss: none


Pathology: none


Condition: critical


Disposition: ICU

## 2021-07-12 NOTE — PROGRESS NOTE
Assessment and Plan


Pulmonary edema


Anemia-microcytic 


Acute kidney injury superimposed on chronic kidney disease


Hyperkalemia


Elevated liver enzymes


Hypertensive urgency


Protein calorie malnutrition


Acute metabolic encephalopathy











-Titrate supplemental oxygen to keep O2 sast 89-92%


-Temporary HD catheter placed to initiate HD- see procedure note


-Supportive transfusions as clinically indicated to keep HgB >7g/dL


-SCDs for VTE prophylaxis for now


-IV prn antihypertensives for blood pressure management


-Continue to avoid nephrotoxins and adjust all medications fro GFR/CrCL


-Mobility, off loading and frequent turning per facility protocol for  pressure 

ulcer prevention


-Iron studies, Vit B12, folate, FOBT-pending





Echocardiogram shows LVEF 45-50%, RVSP 54.


Descending aortic dissection is present and a CTA is recommended. Notified 

primary service





CONDITION: CRITICAL


PROGNOSIS: FAIR


CODE STATUS: FULL CODE





Subjective


Date of service: 07/12/21


Principal diagnosis: Anemia, Abnormal LFTs


Interval history: 


Follow up fro anemia; EDE on CKD requiring HD: protein calorie malnutrition; 

pulmonary edema





Seen and examined. Vitals, labs, medications, chart and imaging reviewed.


Had a quite night per RN. Discussed in MDR.


Some worsening confusion, on language line- patient states we are trying to shot

him with a gun.


No reported or documented fevers, no diarrhea, no vomiting.


No shortness of breath, he is on 2L NC





Objective





- Exam


Narrative Exam: 





Vitals reviewed


General appearance: well-developed, thin built, appears stated age, not in 

distress


HEENT: ATNC, pupils equal


Neck: supple


Respiratory: bibasal rales heard


Cardiology: regular, S1S2, no murmur


Gastrointestinal: soft, bowel sounds heard, not tender


Integumentary: warm and dry, upper chest hypopigmented patches noted


Neurologic: alert, moving extremities


Ext: no edema


                               Vital Signs - 12hr











  07/11/21 07/11/21 07/11/21





  22:37 22:40 22:50


 


Temperature   


 


Pulse Rate 79 79 79


 


Pulse Rate [   





Anterior   





Bilateral   





Throughout]   


 


Pulse Rate [   





From Monitor]   


 


Respiratory  15 15





Rate   


 


Respiratory   





Rate [Anterior   





Bilateral   





Throughout]   


 


Blood Pressure 147/91 147/91 154/99


 


O2 Sat by Pulse  97 99





Oximetry   














  07/11/21 07/11/21 07/11/21





  23:00 23:10 23:20


 


Temperature   


 


Pulse Rate 79 78 79


 


Pulse Rate [   





Anterior   





Bilateral   





Throughout]   


 


Pulse Rate [   





From Monitor]   


 


Respiratory 18 16 13





Rate   


 


Respiratory   





Rate [Anterior   





Bilateral   





Throughout]   


 


Blood Pressure 158/100 158/100 154/93


 


O2 Sat by Pulse 99 99 98





Oximetry   














  07/11/21 07/11/21 07/11/21





  23:30 23:40 23:50


 


Temperature   


 


Pulse Rate 74 73 71


 


Pulse Rate [   





Anterior   





Bilateral   





Throughout]   


 


Pulse Rate [   





From Monitor]   


 


Respiratory 15 14 12





Rate   


 


Respiratory   





Rate [Anterior   





Bilateral   





Throughout]   


 


Blood Pressure 151/95 151/95 147/80


 


O2 Sat by Pulse 99 99 99





Oximetry   














  07/11/21 07/12/21 07/12/21





  23:59 00:00 00:10


 


Temperature 98.7 F  


 


Pulse Rate  71 72


 


Pulse Rate [   





Anterior   





Bilateral   





Throughout]   


 


Pulse Rate [   





From Monitor]   


 


Respiratory  13 14





Rate   


 


Respiratory   





Rate [Anterior   





Bilateral   





Throughout]   


 


Blood Pressure  137/86 137/86


 


O2 Sat by Pulse  98 99





Oximetry   














  07/12/21 07/12/21 07/12/21





  00:20 00:30 00:40


 


Temperature   


 


Pulse Rate 70 70 66


 


Pulse Rate [   





Anterior   





Bilateral   





Throughout]   


 


Pulse Rate [   





From Monitor]   


 


Respiratory 15 13 13





Rate   


 


Respiratory   





Rate [Anterior   





Bilateral   





Throughout]   


 


Blood Pressure 137/80 136/80 136/80


 


O2 Sat by Pulse 100 98 99





Oximetry   














  07/12/21 07/12/21 07/12/21





  00:50 01:00 01:10


 


Temperature   


 


Pulse Rate 68 69 70


 


Pulse Rate [   





Anterior   





Bilateral   





Throughout]   


 


Pulse Rate [   





From Monitor]   


 


Respiratory 11 L 15 16





Rate   


 


Respiratory   





Rate [Anterior   





Bilateral   





Throughout]   


 


Blood Pressure 132/79 138/83 138/83


 


O2 Sat by Pulse 100 98 99





Oximetry   














  07/12/21 07/12/21 07/12/21





  01:20 01:30 01:40


 


Temperature   


 


Pulse Rate 70 64 70


 


Pulse Rate [   





Anterior   





Bilateral   





Throughout]   


 


Pulse Rate [   





From Monitor]   


 


Respiratory 13  





Rate   


 


Respiratory   





Rate [Anterior   





Bilateral   





Throughout]   


 


Blood Pressure 125/78 120/70 120/70


 


O2 Sat by Pulse 99 98 97





Oximetry   














  07/12/21 07/12/21 07/12/21





  01:50 02:00 02:10


 


Temperature   


 


Pulse Rate 69 68 67


 


Pulse Rate [   





Anterior   





Bilateral   





Throughout]   


 


Pulse Rate [   





From Monitor]   


 


Respiratory 16 14 14





Rate   


 


Respiratory   





Rate [Anterior   





Bilateral   





Throughout]   


 


Blood Pressure 137/85 141/84 141/84


 


O2 Sat by Pulse 99 97 99





Oximetry   














  07/12/21 07/12/21 07/12/21





  02:20 02:30 02:40


 


Temperature   


 


Pulse Rate 69 68 67


 


Pulse Rate [   





Anterior   





Bilateral   





Throughout]   


 


Pulse Rate [   





From Monitor]   


 


Respiratory 17 15 15





Rate   


 


Respiratory   





Rate [Anterior   





Bilateral   





Throughout]   


 


Blood Pressure 149/92 153/93 153/93


 


O2 Sat by Pulse 98 97 99





Oximetry   














  07/12/21 07/12/21 07/12/21





  02:50 03:00 03:10


 


Temperature   


 


Pulse Rate 65 64 68


 


Pulse Rate [   





Anterior   





Bilateral   





Throughout]   


 


Pulse Rate [   





From Monitor]   


 


Respiratory 14 15 12





Rate   


 


Respiratory   





Rate [Anterior   





Bilateral   





Throughout]   


 


Blood Pressure 153/95 135/88 135/88


 


O2 Sat by Pulse 98 97 100





Oximetry   














  07/12/21 07/12/21 07/12/21





  03:20 03:25 03:30


 


Temperature  98.8 F 


 


Pulse Rate 63  65


 


Pulse Rate [   





Anterior   





Bilateral   





Throughout]   


 


Pulse Rate [   





From Monitor]   


 


Respiratory 14  14





Rate   


 


Respiratory   





Rate [Anterior   





Bilateral   





Throughout]   


 


Blood Pressure 138/81  125/77


 


O2 Sat by Pulse 100  93





Oximetry   














  07/12/21 07/12/21 07/12/21





  03:40 03:47 03:50


 


Temperature   


 


Pulse Rate 62  


 


Pulse Rate [  66 





Anterior   





Bilateral   





Throughout]   


 


Pulse Rate [   





From Monitor]   


 


Respiratory 14  





Rate   


 


Respiratory  15 





Rate [Anterior   





Bilateral   





Throughout]   


 


Blood Pressure 125/77  115/71


 


O2 Sat by Pulse 99  97





Oximetry   














  07/12/21 07/12/21 07/12/21





  04:00 04:10 04:20


 


Temperature   


 


Pulse Rate 70 66 70


 


Pulse Rate [   





Anterior   





Bilateral   





Throughout]   


 


Pulse Rate [ 70  





From Monitor]   


 


Respiratory 16 14 19





Rate   


 


Respiratory   





Rate [Anterior   





Bilateral   





Throughout]   


 


Blood Pressure 138/81 141/88 162/94


 


O2 Sat by Pulse 97 99 99





Oximetry   














  07/12/21 07/12/21 07/12/21





  04:30 04:40 04:50


 


Temperature   


 


Pulse Rate 69 62 61


 


Pulse Rate [   





Anterior   





Bilateral   





Throughout]   


 


Pulse Rate [   





From Monitor]   


 


Respiratory 17 17 13





Rate   


 


Respiratory   





Rate [Anterior   





Bilateral   





Throughout]   


 


Blood Pressure 163/98 163/98 151/76


 


O2 Sat by Pulse 98 99 98





Oximetry   














  07/12/21 07/12/21 07/12/21





  05:00 05:10 05:20


 


Temperature   


 


Pulse Rate 63 66 61


 


Pulse Rate [   





Anterior   





Bilateral   





Throughout]   


 


Pulse Rate [   





From Monitor]   


 


Respiratory 14 12 15





Rate   


 


Respiratory   





Rate [Anterior   





Bilateral   





Throughout]   


 


Blood Pressure 140/74 140/74 135/73


 


O2 Sat by Pulse 98 99 99





Oximetry   














  07/12/21 07/12/21 07/12/21





  05:30 05:40 05:50


 


Temperature   


 


Pulse Rate 61 60 59 L


 


Pulse Rate [   





Anterior   





Bilateral   





Throughout]   


 


Pulse Rate [   





From Monitor]   


 


Respiratory 13 14 14





Rate   


 


Respiratory   





Rate [Anterior   





Bilateral   





Throughout]   


 


Blood Pressure 123/63 123/63 125/62


 


O2 Sat by Pulse 98 99 99





Oximetry   














  07/12/21 07/12/21 07/12/21





  06:00 06:04 06:10


 


Temperature   


 


Pulse Rate 61 59 L 64


 


Pulse Rate [   





Anterior   





Bilateral   





Throughout]   


 


Pulse Rate [   





From Monitor]   


 


Respiratory 14  13





Rate   


 


Respiratory   





Rate [Anterior   





Bilateral   





Throughout]   


 


Blood Pressure 129/69 129/69 129/69


 


O2 Sat by Pulse   100





Oximetry   














  07/12/21 07/12/21 07/12/21





  06:20 06:30 06:40


 


Temperature   


 


Pulse Rate 63 61 70


 


Pulse Rate [   





Anterior   





Bilateral   





Throughout]   


 


Pulse Rate [   





From Monitor]   


 


Respiratory 17 14 15





Rate   


 


Respiratory   





Rate [Anterior   





Bilateral   





Throughout]   


 


Blood Pressure 136/83 145/80 145/80


 


O2 Sat by Pulse 99 98 97





Oximetry   














  07/12/21 07/12/21 07/12/21





  06:50 07:00 07:10


 


Temperature  97.5 F L 


 


Pulse Rate 64 63 63


 


Pulse Rate [   





Anterior   





Bilateral   





Throughout]   


 


Pulse Rate [   





From Monitor]   


 


Respiratory 12 12 13





Rate   


 


Respiratory   





Rate [Anterior   





Bilateral   





Throughout]   


 


Blood Pressure 141/90 144/80 144/80


 


O2 Sat by Pulse 98 97 99





Oximetry   














  07/12/21 07/12/21 07/12/21





  07:20 07:30 07:40


 


Temperature   


 


Pulse Rate 63 63 67


 


Pulse Rate [   





Anterior   





Bilateral   





Throughout]   


 


Pulse Rate [   





From Monitor]   


 


Respiratory 12 12 17





Rate   


 


Respiratory   





Rate [Anterior   





Bilateral   





Throughout]   


 


Blood Pressure 143/85 151/85 151/85


 


O2 Sat by Pulse 99 98 97





Oximetry   














  07/12/21 07/12/21 07/12/21





  07:50 07:52 08:00


 


Temperature   


 


Pulse Rate 69  69


 


Pulse Rate [  69 





Anterior   





Bilateral   





Throughout]   


 


Pulse Rate [   74





From Monitor]   


 


Respiratory 16  13





Rate   


 


Respiratory  20 





Rate [Anterior   





Bilateral   





Throughout]   


 


Blood Pressure 184/103  184/103


 


O2 Sat by Pulse 96 95 97





Oximetry   














  07/12/21 07/12/21 07/12/21





  08:10 08:20 08:30


 


Temperature   


 


Pulse Rate 65 64 64


 


Pulse Rate [   





Anterior   





Bilateral   





Throughout]   


 


Pulse Rate [   





From Monitor]   


 


Respiratory 15 15 13





Rate   


 


Respiratory   





Rate [Anterior   





Bilateral   





Throughout]   


 


Blood Pressure 152/79 160/92 152/88


 


O2 Sat by Pulse 98 98 99





Oximetry   














  07/12/21 07/12/21 07/12/21





  08:40 08:50 09:00


 


Temperature   


 


Pulse Rate 68 70 68


 


Pulse Rate [   





Anterior   





Bilateral   





Throughout]   


 


Pulse Rate [   





From Monitor]   


 


Respiratory 15 14 17





Rate   


 


Respiratory   





Rate [Anterior   





Bilateral   





Throughout]   


 


Blood Pressure 175/100 171/98 188/107


 


O2 Sat by Pulse 94 99 99





Oximetry   














  07/12/21 07/12/21 07/12/21





  09:10 09:20 09:30


 


Temperature   


 


Pulse Rate 72 72 68


 


Pulse Rate [   





Anterior   





Bilateral   





Throughout]   


 


Pulse Rate [   





From Monitor]   


 


Respiratory 17  





Rate   


 


Respiratory   





Rate [Anterior   





Bilateral   





Throughout]   


 


Blood Pressure 175/113 176/107 168/95


 


O2 Sat by Pulse 95 93 93





Oximetry   














  07/12/21 07/12/21 07/12/21





  09:40 09:50 10:00


 


Temperature   


 


Pulse Rate 70 69 70


 


Pulse Rate [   





Anterior   





Bilateral   





Throughout]   


 


Pulse Rate [   





From Monitor]   


 


Respiratory   





Rate   


 


Respiratory   





Rate [Anterior   





Bilateral   





Throughout]   


 


Blood Pressure 163/91 150/87 135/83


 


O2 Sat by Pulse 91 92 88





Oximetry   














  07/12/21 07/12/21 07/12/21





  10:10 10:20 10:30


 


Temperature   


 


Pulse Rate 69 69 67


 


Pulse Rate [   





Anterior   





Bilateral   





Throughout]   


 


Pulse Rate [   





From Monitor]   


 


Respiratory   14





Rate   


 


Respiratory   





Rate [Anterior   





Bilateral   





Throughout]   


 


Blood Pressure 148/78 128/87 147/90


 


O2 Sat by Pulse 90 93 96





Oximetry   











CBC and BMP: 


                                 07/12/21 04:25





                                 07/12/21 04:25


ABG, PT/INR, D-dimer: 


ABG











ABG pH  7.384 pH Units (7.350-7.450)   07/11/21  14:40    


 


ABG pCO2  32.3 mm Hg  07/11/21  14:40    


 


ABG pO2  107.8 mm Hg (80.0-90.0)  H  07/11/21  14:40    


 


ABG O2 Saturation  97.9 % (95.0-99.0)   07/11/21  14:40    








Abnormal lab findings: 


                                  Abnormal Labs











  07/10/21 07/10/21 07/11/21





  22:43 22:43 04:28


 


RBC   


 


Hgb  8.5 L  


 


Hct  26.1 L  


 


MCV  65 L  


 


MCH  21 L  


 


RDW  18.4 H  


 


Plt Count   


 


Seg Neuts % (Manual)  80.0 H  


 


Lymphocytes % (Manual)  13.0 L  


 


Lymphocytes # (Manual)  0.9 L  


 


ABG pO2   


 


ABG HCO3   


 


ABG Base Excess   


 


ABG Hemoglobin   


 


Potassium   5.9 H  5.2 H


 


Carbon Dioxide   14 L  20 L


 


BUN   72 H  72 H


 


Creatinine   6.8 H  6.5 H


 


Glucose   152 H 


 


POC Glucose   


 


Phosphorus   


 


Total Bilirubin   1.50 H  1.40 H


 


AST   517 H  604 H


 


ALT   448 H  491 H


 


Total Protein    6.2 L


 


Albumin   3.8 L  3.8 L


 


PTH Intact   


 


Urine Creatinine   


 


Urine Total Protein   














  07/11/21 07/11/21 07/11/21





  04:32 08:23 08:23


 


RBC   


 


Hgb   7.8 L 


 


Hct   24.9 L 


 


MCV   66 L 


 


MCH   21 L 


 


RDW   18.0 H 


 


Plt Count   98 L 


 


Seg Neuts % (Manual)   


 


Lymphocytes % (Manual)   1.0 L 


 


Lymphocytes # (Manual)   0.1 L 


 


ABG pO2   


 


ABG HCO3   


 


ABG Base Excess   


 


ABG Hemoglobin   


 


Potassium    5.7 H


 


Carbon Dioxide    14 L


 


BUN    77 H


 


Creatinine    6.7 H


 


Glucose    126 H


 


POC Glucose  114 H  


 


Phosphorus   


 


Total Bilirubin   


 


AST   


 


ALT   


 


Total Protein   


 


Albumin   


 


PTH Intact   


 


Urine Creatinine   


 


Urine Total Protein   














  07/11/21 07/11/21 07/11/21





  08:58 11:33 14:40


 


RBC   


 


Hgb   


 


Hct   


 


MCV   


 


MCH   


 


RDW   


 


Plt Count   


 


Seg Neuts % (Manual)   


 


Lymphocytes % (Manual)   


 


Lymphocytes # (Manual)   


 


ABG pO2    107.8 H


 


ABG HCO3    18.8 L


 


ABG Base Excess    -5.6 L


 


ABG Hemoglobin    7.4 L


 


Potassium   


 


Carbon Dioxide   


 


BUN   


 


Creatinine   


 


Glucose   


 


POC Glucose  139 H  155 H 


 


Phosphorus   


 


Total Bilirubin   


 


AST   


 


ALT   


 


Total Protein   


 


Albumin   


 


PTH Intact   


 


Urine Creatinine   


 


Urine Total Protein   














  07/11/21 07/11/21 07/11/21





  15:40 16:46 21:29


 


RBC   


 


Hgb   


 


Hct   


 


MCV   


 


MCH   


 


RDW   


 


Plt Count   


 


Seg Neuts % (Manual)   


 


Lymphocytes % (Manual)   


 


Lymphocytes # (Manual)   


 


ABG pO2   


 


ABG HCO3   


 


ABG Base Excess   


 


ABG Hemoglobin   


 


Potassium   


 


Carbon Dioxide   


 


BUN   


 


Creatinine   


 


Glucose   


 


POC Glucose   158 H  138 H


 


Phosphorus   


 


Total Bilirubin   


 


AST   


 


ALT   


 


Total Protein   


 


Albumin   


 


PTH Intact   


 


Urine Creatinine  78.5 H  


 


Urine Total Protein  745 H  














  07/12/21 07/12/21 07/12/21





  04:25 04:25 04:25


 


RBC  3.50 L  


 


Hgb  7.3 L  


 


Hct  22.8 L  


 


MCV  65 L  


 


MCH  21 L  


 


RDW  17.9 H  


 


Plt Count   


 


Seg Neuts % (Manual)   


 


Lymphocytes % (Manual)   


 


Lymphocytes # (Manual)   


 


ABG pO2   


 


ABG HCO3   


 


ABG Base Excess   


 


ABG Hemoglobin   


 


Potassium   


 


Carbon Dioxide   20 L 


 


BUN   89 H 


 


Creatinine   7.6 H 


 


Glucose   129 H 


 


POC Glucose   


 


Phosphorus   8.20 H 


 


Total Bilirubin   


 


AST   502 H 


 


ALT   636 H 


 


Total Protein   5.7 L 


 


Albumin   3.2 L 


 


PTH Intact    176.3 H


 


Urine Creatinine   


 


Urine Total Protein   














  07/12/21





  07:21


 


RBC 


 


Hgb 


 


Hct 


 


MCV 


 


MCH 


 


RDW 


 


Plt Count 


 


Seg Neuts % (Manual) 


 


Lymphocytes % (Manual) 


 


Lymphocytes # (Manual) 


 


ABG pO2 


 


ABG HCO3 


 


ABG Base Excess 


 


ABG Hemoglobin 


 


Potassium 


 


Carbon Dioxide 


 


BUN 


 


Creatinine 


 


Glucose 


 


POC Glucose  120 H


 


Phosphorus 


 


Total Bilirubin 


 


AST 


 


ALT 


 


Total Protein 


 


Albumin 


 


PTH Intact 


 


Urine Creatinine 


 


Urine Total Protein 











Chest x-ray: image reviewed (Alveolar edema with small pleural effusions)


Allied health notes reviewed: nursing

## 2021-07-13 LAB
ALBUMIN SERPL-MCNC: 3 G/DL (ref 3.9–5)
ALT SERPL-CCNC: 402 UNITS/L (ref 7–56)
APTT BLD: 33.8 SEC. (ref 24.2–36.6)
BUN SERPL-MCNC: 67 MG/DL (ref 9–20)
BUN/CREAT SERPL: 12 %
CALCIUM SERPL-MCNC: 8.3 MG/DL (ref 8.4–10.2)
HCT VFR BLD CALC: 23.8 % (ref 35.5–45.6)
HEMOLYSIS INDEX: 2
HGB BLD-MCNC: 7.9 GM/DL (ref 11.8–15.2)
INR PPP: 1.48 (ref 0.87–1.13)
MCHC RBC AUTO-ENTMCNC: 33 % (ref 32–34)
MCV RBC AUTO: 65 FL (ref 84–94)
PLATELET # BLD: 94 K/MM3 (ref 140–440)
RBC # BLD AUTO: 3.65 M/MM3 (ref 3.65–5.03)

## 2021-07-13 PROCEDURE — 0JH63XZ INSERTION OF TUNNELED VASCULAR ACCESS DEVICE INTO CHEST SUBCUTANEOUS TISSUE AND FASCIA, PERCUTANEOUS APPROACH: ICD-10-PCS | Performed by: RADIOLOGY

## 2021-07-13 PROCEDURE — 5A1D70Z PERFORMANCE OF URINARY FILTRATION, INTERMITTENT, LESS THAN 6 HOURS PER DAY: ICD-10-PCS | Performed by: INTERNAL MEDICINE

## 2021-07-13 PROCEDURE — B548ZZA ULTRASONOGRAPHY OF SUPERIOR VENA CAVA, GUIDANCE: ICD-10-PCS | Performed by: RADIOLOGY

## 2021-07-13 PROCEDURE — 02H633Z INSERTION OF INFUSION DEVICE INTO RIGHT ATRIUM, PERCUTANEOUS APPROACH: ICD-10-PCS | Performed by: RADIOLOGY

## 2021-07-13 RX ADMIN — LIDOCAINE HYDROCHLORIDE,EPINEPHRINE BITARTRATE ONE ML: 10; .01 INJECTION, SOLUTION INFILTRATION; PERINEURAL at 14:55

## 2021-07-13 RX ADMIN — FUROSEMIDE SCH: 10 INJECTION, SOLUTION INTRAVENOUS at 12:42

## 2021-07-13 RX ADMIN — INSULIN HUMAN SCH: 100 INJECTION, SOLUTION PARENTERAL at 17:28

## 2021-07-13 RX ADMIN — INSULIN HUMAN SCH UNITS: 100 INJECTION, SOLUTION PARENTERAL at 21:53

## 2021-07-13 RX ADMIN — CALCIUM ACETATE SCH: 667 CAPSULE ORAL at 12:32

## 2021-07-13 RX ADMIN — NITROGLYCERIN TRANSDERMAL SYSTEM SCH MG: 0.4 PATCH, EXTENDED RELEASE TRANSDERMAL at 05:35

## 2021-07-13 RX ADMIN — INSULIN HUMAN SCH: 100 INJECTION, SOLUTION PARENTERAL at 12:32

## 2021-07-13 RX ADMIN — INSULIN HUMAN SCH: 100 INJECTION, SOLUTION PARENTERAL at 07:46

## 2021-07-13 RX ADMIN — Medication SCH ML: at 12:38

## 2021-07-13 RX ADMIN — FENTANYL CITRATE ONE MCG: 50 INJECTION, SOLUTION INTRAMUSCULAR; INTRAVENOUS at 14:53

## 2021-07-13 RX ADMIN — EPOETIN ALFA-EPBX PRN UNIT: 20000 INJECTION, SOLUTION INTRAVENOUS; SUBCUTANEOUS at 11:35

## 2021-07-13 RX ADMIN — CALCIUM ACETATE SCH MG: 667 CAPSULE ORAL at 17:43

## 2021-07-13 RX ADMIN — ASPIRIN SCH MG: 81 TABLET, COATED ORAL at 12:37

## 2021-07-13 RX ADMIN — PANTOPRAZOLE SODIUM SCH MG: 40 INJECTION, POWDER, FOR SOLUTION INTRAVENOUS at 21:53

## 2021-07-13 RX ADMIN — HEPARIN SODIUM ONE UNIT: 1000 INJECTION, SOLUTION INTRAVENOUS; SUBCUTANEOUS at 15:44

## 2021-07-13 RX ADMIN — PANTOPRAZOLE SODIUM SCH MG: 40 INJECTION, POWDER, FOR SOLUTION INTRAVENOUS at 12:37

## 2021-07-13 RX ADMIN — FENTANYL CITRATE ONE MCG: 50 INJECTION, SOLUTION INTRAMUSCULAR; INTRAVENOUS at 15:28

## 2021-07-13 RX ADMIN — HEPARIN SODIUM ONE UNIT: 1000 INJECTION, SOLUTION INTRAVENOUS; SUBCUTANEOUS at 15:45

## 2021-07-13 RX ADMIN — FENTANYL CITRATE ONE MCG: 50 INJECTION, SOLUTION INTRAMUSCULAR; INTRAVENOUS at 15:40

## 2021-07-13 RX ADMIN — LIDOCAINE HYDROCHLORIDE,EPINEPHRINE BITARTRATE ONE ML: 10; .01 INJECTION, SOLUTION INFILTRATION; PERINEURAL at 14:34

## 2021-07-13 RX ADMIN — Medication SCH ML: at 21:54

## 2021-07-13 NOTE — CAT SCAN REPORT
CT CHEST WITHOUT CONTRAST



INDICATION / CLINICAL INFORMATION:

right vascath bleeding, course of catheter.



TECHNIQUE:

Axial CT images were obtained through the chest without contrast. All CT scans at this location are p
erformed using CT dose reduction for ALARA by means of automated exposure control. 



COMPARISON:

None available.



FINDINGS:

There is marked cardiomegaly. There is a noncontrast examination which limits evaluation. There is di
ffuse dilatation of the thoracic aorta with dilatation extending to the brachiocephalic and descendin
g thoracic aorta. Findings appear similar to the prior examination. Atherosclerotic constipation thro
ughout the aorta persist. A few mildly prominent paratracheal mediastinal nodes appears similar to pr
ior exam. There is increased opacity within the lungs most significant in the right upper lung. Bilat
eral pleural effusions.



There is a rounded collection of the right neck just deep to the catheter tip and adjacent to the thy
roid measuring 3 cm. This has a Hounsfield unit 39 which could represent hematoma. The catheter exten
ds into the right jugular. Degenerative change throughout spine. Bilateral renal cysts. Abdominal aor
tic aneurysm measures 5.7 cm





IMPRESSION:

1. Rounded density/fluid collection adjacent to the Vas-Cath in the right neck just deep to it sugges
ting possible hematoma and cause of bleed.

2. Increased opacity throughout the lungs most significant in right upper lung could represent atypic
al infiltrate.

3. Thoracic aortic aneurysm and abdominal aortic aneurysm appears similar to prior exam. This is a no
ncontrast examination which is limited.

4. Pleural effusions ============================================





CRITICAL RESULT:

Time of Discovery (CST/CDT): 538

Time of Communication (CST/CDT): 538





============================================



Signer Name: Juarez Boyd MD 

Signed: 7/13/2021 6:40 PM

Work[facility] Name: [id number]

## 2021-07-13 NOTE — PROGRESS NOTE
Assessment and Plan





Perioperative risk


Severe anemia


Elevated liver enzymes


Renal failure


   initiated on hemodialysis 


Possible prior coronary artery bypass


   Left ventricular ejection fraction is 45-50% by echo this presentation.





Recommendations:


His cardiac risk for noncardiac surgery is moderate, you may proceed with GI 

endoscopy.  


Continue medical therapy for underlying coronary artery disease. Patient is 

currently a poor candidate for antiplatelet or anticoagulant therapy due to 

underlying severe anemia.





Subjective


Date of service: 07/13/21


Principal diagnosis: Anemia, Abnormal LFTs


Interval history: 





Seen in dialysis.


12 lead ECG is pending.





Objective


                                   Vital Signs











  Temp Pulse Pulse Pulse Resp Resp BP


 


 07/13/21 08:45       


 


 07/13/21 07:47      20  


 


 07/13/21 07:32  98.0 F  38 L    16   126/62


 


 07/13/21 05:35   69      136/77


 


 07/13/21 04:36  98.3 F  67    16   136/77


 


 07/13/21 04:00   69     


 


 07/13/21 00:00   77    20  


 


 07/12/21 23:12  97.3 F L  41 L    20   128/76


 


 07/12/21 21:55   113 H      106/77


 


 07/12/21 21:54   113 H      106/77


 


 07/12/21 21:40       


 


 07/12/21 20:07  97.8 F     16   103/65


 


 07/12/21 20:00   65     


 


 07/12/21 18:47  98.0 F     19   99/62


 


 07/12/21 17:30   76    14   114/68


 


 07/12/21 17:20   75    16   123/73


 


 07/12/21 17:10   74    14   149/98


 


 07/12/21 17:00   77    15   149/98


 


 07/12/21 16:50   75    15   154/94


 


 07/12/21 16:40   73    15   159/88


 


 07/12/21 16:30   68    15   159/88


 


 07/12/21 16:20   73    15   161/87


 


 07/12/21 16:10   69    14   149/86


 


 07/12/21 16:00  98.7 F  76   74  13   149/86


 


 07/12/21 15:53   65      93/63


 


 07/12/21 15:50   73    20   120/83


 


 07/12/21 15:45   69      120/83


 


 07/12/21 15:40   67    15   139/93


 


 07/12/21 15:30   66    18   130/92


 


 07/12/21 15:20   70    15   139/93


 


 07/12/21 15:15   65      139/93


 


 07/12/21 15:10  98.6 F  69    20   143/95


 


 07/12/21 15:00   66    15   143/95


 


 07/12/21 14:50   70    13   148/88


 


 07/12/21 14:45   64      148/88


 


 07/12/21 14:40  98.7 F  65    13   142/78


 


 07/12/21 14:30   66    12   148/85


 


 07/12/21 14:20   68    17   148/85


 


 07/12/21 14:15   68      148/85


 


 07/12/21 14:10   68    16   155/85


 


 07/12/21 14:00   68  69   15  20  155/85


 


 07/12/21 13:53   68      152/86


 


 07/12/21 13:50  98.7 F  75    18   146/82


 


 07/12/21 13:40   69    15   147/87


 


 07/12/21 13:30        145/83


 


 07/12/21 13:20        148/90


 


 07/12/21 13:10   74    16   168/90


 


 07/12/21 13:00   74    13   163/97


 


 07/12/21 12:50   74    18   163/95


 


 07/12/21 12:40   70    15   161/96


 


 07/12/21 12:30   71    15   159/91


 


 07/12/21 12:20   75    17   171/103


 


 07/12/21 12:10   69    15   169/100


 


 07/12/21 12:00  97.7 F  68   74  14   163/98


 


 07/12/21 11:50   63    13   168/94


 


 07/12/21 11:40   69    13   164/94


 


 07/12/21 11:30  97.7 F  68    12   170/98


 


 07/12/21 11:20   69    12   167/103


 


 07/12/21 11:10   69    17   143/96


 


 07/12/21 11:00   70    16   173/101


 


 07/12/21 10:50   68    14   156/94


 


 07/12/21 10:40   68    14   159/88


 


 07/12/21 10:30   67    14   147/90


 


 07/12/21 10:20   69      128/87


 


 07/12/21 10:10   69      148/78














  Pulse Ox


 


 07/13/21 08:45  91


 


 07/13/21 07:47  97


 


 07/13/21 07:32  93


 


 07/13/21 05:35 


 


 07/13/21 04:36  93


 


 07/13/21 04:00 


 


 07/13/21 00:00  97


 


 07/12/21 23:12  93


 


 07/12/21 21:55 


 


 07/12/21 21:54 


 


 07/12/21 21:40  92


 


 07/12/21 20:07 


 


 07/12/21 20:00 


 


 07/12/21 18:47 


 


 07/12/21 17:30  93


 


 07/12/21 17:20  94


 


 07/12/21 17:10  94


 


 07/12/21 17:00  95


 


 07/12/21 16:50  95


 


 07/12/21 16:40  95


 


 07/12/21 16:30  93


 


 07/12/21 16:20  97


 


 07/12/21 16:10  95


 


 07/12/21 16:00  97


 


 07/12/21 15:53 


 


 07/12/21 15:50  95


 


 07/12/21 15:45 


 


 07/12/21 15:40  98


 


 07/12/21 15:30  95


 


 07/12/21 15:20  97


 


 07/12/21 15:15 


 


 07/12/21 15:10  97


 


 07/12/21 15:00  93


 


 07/12/21 14:50  93


 


 07/12/21 14:45 


 


 07/12/21 14:40  96


 


 07/12/21 14:30 


 


 07/12/21 14:20  96


 


 07/12/21 14:15 


 


 07/12/21 14:10  95


 


 07/12/21 14:00  95


 


 07/12/21 13:53 


 


 07/12/21 13:50  95


 


 07/12/21 13:40  91


 


 07/12/21 13:30  98


 


 07/12/21 13:20  86


 


 07/12/21 13:10  96


 


 07/12/21 13:00  85


 


 07/12/21 12:50  94


 


 07/12/21 12:40  94


 


 07/12/21 12:30  96


 


 07/12/21 12:20  95


 


 07/12/21 12:10  96


 


 07/12/21 12:00  95


 


 07/12/21 11:50  94


 


 07/12/21 11:40  96


 


 07/12/21 11:30  96


 


 07/12/21 11:20  95


 


 07/12/21 11:10  94


 


 07/12/21 11:00  93


 


 07/12/21 10:50 


 


 07/12/21 10:40  92


 


 07/12/21 10:30  96


 


 07/12/21 10:20  93


 


 07/12/21 10:10  90














- Physical Examination


General: No Apparent Distress


HEENT: Positive: PERRL


Neck: Positive: neck supple


Cardiac: Positive: Reg Rate and Rhythm


Lungs: Positive: Decreased Breath Sounds


Neuro: Positive: Weakness (Generalized weakness, frail and chronically ill 

appearing)





- Labs and Meds


                                 Cardiac Enzymes











  07/13/21 Range/Units





  05:32 


 


AST  207 H  (5-40)  units/L








                                       CBC











  07/12/21 07/13/21 Range/Units





  04:25 05:32 


 


WBC   7.3  (4.5-11.0)  K/mm3


 


RBC   3.65  (3.65-5.03)  M/mm3


 


Hgb   7.9 L  (11.8-15.2)  gm/dl


 


Hct   23.8 L  (35.5-45.6)  %


 


Plt Count  88 L  94 L  (140-440)  K/mm3








                          Comprehensive Metabolic Panel











  07/13/21 Range/Units





  05:32 


 


Sodium  140  (137-145)  mmol/L


 


Potassium  4.0  (3.6-5.0)  mmol/L


 


Chloride  100.9  ()  mmol/L


 


Carbon Dioxide  26  (22-30)  mmol/L


 


BUN  67 H  (9-20)  mg/dL


 


Creatinine  5.8 H  (0.8-1.3)  mg/dL


 


Glucose  106 H  ()  mg/dL


 


Calcium  8.3 L  (8.4-10.2)  mg/dL


 


AST  207 H  (5-40)  units/L


 


ALT  402 H  (7-56)  units/L


 


Alkaline Phosphatase  89  ()  units/L


 


Total Protein  5.1 L  (6.3-8.2)  g/dL


 


Albumin  3.0 L  (3.9-5)  g/dL














- Allied health notes


Allied health notes reviewed: nursing

## 2021-07-13 NOTE — EVENT NOTE
Date: 07/12/21


Admitted early Am


hypertensive emergency


Anemia


EDE/CKD





Antihypertensives  started

## 2021-07-13 NOTE — CONSULTATION
History of Present Illness





- Reason for Consult


Consult date: 07/13/21


Permcath placement


Requesting physician: SHEEBA THORNTON





- History of Present Illness





80-year-old [place] male with known history of hypertension and diabetes 

mellitus, history of thoracic aortic dissection repair presents to the emergency

room today complaining of nausea, decreased appetite, abdominal pain and dark 

stool which has been ongoing for the past 2 weeks.


Most of the history was gotten from the granddaughter who was by the bedside 

because of the language barrier.





Abdominal pain is said to have been persistent over the past few weeks.  Patient

also reports significant weight loss.  He denies any hematemesis, no hematuria 

or dysuria, denies any constipation.





Patient denies any nonsteroidal anti-inflammatory medication use apart from 

aspirin.


Patient's last colonoscopy was over 15 years ago while living in [place].





Work-up in the emergency room today, significant findings were that of 

hemoglobin of 8.5 and hematocrit of 26.1, potassium was elevated at 5.9, BUN and

creatinine were elevated at 72 and 6.8, AST and ALT elevated at 517 and 448 

respectively.  Total bilirubin was 1.50





CT of the abdomen and pelvis reveals cardiomegaly with interlobular septal 

thickening in the lung bases likely reflecting edema.  Moderate right and trace 

left pleural effusions.  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 

2019.





Patient is being admitted with acute on chronic renal failure, hyperkalemia, 

abnormal liver enzymes, anemia possible GI bleed.





Renal failure did not improve and therefore vascular was consulted.





History was obtained from patient's granddaughter due to altered mental status.





Past History


Past Medical History: diabetes, hypertension, hyperlipidemia


Past Surgical History: Other (Aortic dissection repair, colonoscopy over 15 

years ago)


Social history: smoking (Former smoker).  denies: alcohol abuse


Family history: no significant family history





Medications and Allergies


                                    Allergies











Allergy/AdvReac Type Severity Reaction Status Date / Time


 


No Known Allergies Allergy   Unverified 01/17/15 01:13











                                Home Medications











 Medication  Instructions  Recorded  Confirmed  Last Taken  Type


 


Amlodipine Besylate [Norvasc] 10 mg PO QDAY 07/10/21 07/10/21 Unknown History


 


Aspirin EC [Halfprin EC] 81 mg PO QDAY 07/10/21 07/10/21 Unknown History


 


Atorvastatin Calcium [Lipitor] 80 mg PO QDAY 07/10/21 07/10/21 Unknown History


 


Lisinopril [Zestril] 10 mg PO QDAY 07/10/21 07/10/21 Unknown History











Active Meds: 


Active Medications





Acetaminophen (Acetaminophen 325 Mg Tab)  650 mg PO Q6H PRN


   PRN Reason: Pain MILD(1-3)/Fever >100.5/HA


Amlodipine Besylate (Amlodipine 10 Mg Tab)  10 mg PO QDAY [facility]


   Last Admin: 07/13/21 12:37 Dose:  10 mg


   Documented by: 


Aspirin (Aspirin Ec 81 Mg Tab)  81 mg PO QDAY [facility]


   Last Admin: 07/13/21 12:37 Dose:  81 mg


   Documented by: 


Calcium Acetate (Calcium Acetate 667 Mg Cap)  667 mg PO TIDWM [facility]


   Last Admin: 07/13/21 12:32 Dose:  Not Given


   Documented by: 


Carvedilol (Carvedilol 12.5 Mg Tab)  12.5 mg PO BID [facility]


   Last Admin: 07/13/21 12:37 Dose:  12.5 mg


   Documented by: 


Dextrose (Dextrose 50% In Water (25gm) 50 Ml Syringe)  0 ml IV Q30MIN PRN; 

Protocol


   PRN Reason: Hypoglycemia


Furosemide (Furosemide 40 Mg Tab)  80 mg PO QDAY [facility]


Hydralazine HCl (Hydralazine 20 Mg/1 Ml Inj)  10 mg IV Q4H PRN


   PRN Reason: Blood Pressure


   Last Admin: 07/11/21 10:10 Dose:  10 mg


   Documented by: 


Sodium Chloride (Nacl 0.9%)  100 mls @ 999 mls/hr IV LIBIA PRN


   PRN Reason: Hypotension


Insulin Human Regular (Insulin Regular, Human 100 Units/1 Ml)  0 units SUB-Q 

ACHS [facility]; Protocol


   Last Admin: 07/13/21 12:32 Dose:  Not Given


   Documented by: 


Magnesium Hydroxide (Magnesium Hydroxide (Mom) Oral Liqd Udc)  30 ml PO Q4H PRN


   PRN Reason: Constipation


Nitroglycerin (Nitroglycerin 0.4 Mg Patch 24hr)  0.4 mg TD QDAY@0600 [facility]


   Last Admin: 07/13/21 05:35 Dose:  0.4 mg


   Documented by: 


Ondansetron HCl (Ondansetron 4 Mg/2 Ml Inj)  4 mg IV Q8H PRN


   PRN Reason: Nausea And Vomiting


Pantoprazole Sodium (Pantoprazole 40 Mg Inj)  40 mg IV BID [facility]


   Last Admin: 07/13/21 12:37 Dose:  40 mg


   Documented by: 


Sodium Chloride (Sodium Chloride 0.9% 10 Ml Flush Syringe)  10 ml IV BID [facility]


   Last Admin: 07/13/21 12:38 Dose:  10 ml


   Documented by: 


Sodium Chloride (Sodium Chloride 0.9% 10 Ml Flush Syringe)  10 ml IV PRN PRN


   PRN Reason: LINE FLUSH











Review of Systems


ROS unobtainable: due to mental status





Exam





- Constitutional


Vitals: 


                                        











Temp Pulse Resp BP Pulse Ox


 


 98.3 F   73   18   133/70   91 


 


 07/13/21 12:05  07/13/21 12:05  07/13/21 12:05  07/13/21 12:05  07/13/21 08:45











General appearance: Present: no acute distress





- EENT


Eyes: Present: EOM intact





- Neck


Neck: Present: other (Oozing from right internal jugular vein catheter)





- Respiratory


Respiratory effort: normal





- Abdominal


General gastrointestinal: Present: soft, non-tender





- Psychiatric


Psychiatric: cooperative





Results





- Labs


CBC & Chem 7: 


                                 07/13/21 05:32





                                 07/13/21 05:32


Labs: 


                              Abnormal lab results











  07/12/21 07/12/21 07/13/21 Range/Units





  16:28 21:07 05:32 


 


Hgb    7.9 L  (11.8-15.2)  gm/dl


 


Hct    23.8 L  (35.5-45.6)  %


 


MCV    65 L  (84-94)  fl


 


MCH    22 L  (28-32)  pg


 


RDW    17.6 H  (13.2-15.2)  %


 


Plt Count    94 L  (140-440)  K/mm3


 


BUN     (9-20)  mg/dL


 


Creatinine     (0.8-1.3)  mg/dL


 


Glucose     ()  mg/dL


 


POC Glucose  146 H  167 H   ()  mg/dL


 


Calcium     (8.4-10.2)  mg/dL


 


Phosphorus     (2.5-4.5)  mg/dL


 


AST     (5-40)  units/L


 


ALT     (7-56)  units/L


 


Total Protein     (6.3-8.2)  g/dL


 


Albumin     (3.9-5)  g/dL














  07/13/21 07/13/21 Range/Units





  05:32 15:10 


 


Hgb    (11.8-15.2)  gm/dl


 


Hct    (35.5-45.6)  %


 


MCV    (84-94)  fl


 


MCH    (28-32)  pg


 


RDW    (13.2-15.2)  %


 


Plt Count    (140-440)  K/mm3


 


BUN  67 H   (9-20)  mg/dL


 


Creatinine  5.8 H   (0.8-1.3)  mg/dL


 


Glucose  106 H   ()  mg/dL


 


POC Glucose   137 H  ()  mg/dL


 


Calcium  8.3 L   (8.4-10.2)  mg/dL


 


Phosphorus  6.50 H D   (2.5-4.5)  mg/dL


 


AST  207 H   (5-40)  units/L


 


ALT  402 H   (7-56)  units/L


 


Total Protein  5.1 L   (6.3-8.2)  g/dL


 


Albumin  3.0 L   (3.9-5)  g/dL














Assessment and Plan





80-year-old male with multiple medical issues who presented with acute on 

opal renal failure requiring hemodialysis.





Right internal jugular vein Vas-Cath was placed in the ICU, which is complicated

 by excessive bleeding from the venotomy site.  Ultrasound was performed and it 

appears to be going to the right internal jugular vein, but given bleeding, 

recommend CT scan of the chest to check the course of the catheter.  Central 

catheter lumen was opened and no arterial bleeding was noted.





Plan for left internal jugular vein PermCath placement.  Risks, benefits, and 

alternatives discussed.  Patient's family agreed with procedure.





If CT scan demonstrates no abnormalities, then right internal jugular vein Vas-

Cath can be removed tomorrow after from left internal jugular PermCath dialysis.

## 2021-07-13 NOTE — GASTROENTEROLOGY PROGRESS NOTE
Assessment and Plan





- Patient Problems


(1) CHF (congestive heart failure), NYHA class III


Current Visit: Yes   Status: Acute   


Plan to address problem: 


- Cards eval/TTE results noted.  Moderate disease, and hopefully will improve 

after fluid removal with HD.








(2) Acute kidney injury superimposed on chronic kidney disease


Current Visit: Yes   Status: Acute   





(3) Elevated liver enzymes


Current Visit: Yes   Status: Acute   


Plan to address problem: 


- Hepatitis serologies negative.


- No gross mass on CT or US, but this would be highest on differential.  With 

EDE, and (?) new atorvastatin, would consider statin liver injury as well.


- Would consider non-contrast MRI when more stable (but LFTs trending down after

d/c atorvastatin).








(4) GI bleed


Current Visit: Yes   Status: Acute   


Plan to address problem: 


- Continue current protonix.


- Will need EGD (and possibly colonoscopy); assuming HD goes well today, will 

plan EGD tomorrow.


- OK to continue cardiac ASA, but would avoid other blood thinners at present.








(5) Hypertensive urgency


Current Visit: Yes   Status: Acute   





Subjective


Date of service: 07/13/21


Principal diagnosis: Anemia, Abnormal LFTs


Interval history: 





The patient is on 2nd round of HD today.  He is stable without melena or he

matemesis.  He has no N/V/abdominal pain.





Objective





- Constitutional


Vitals: 


                                        











Temp Pulse Resp BP Pulse Ox


 


 98.3 F   70   18   133/70   91 


 


 07/13/21 09:20  07/13/21 11:45  07/13/21 09:20  07/13/21 11:45  07/13/21 08:45











General appearance: no acute distress





- Respiratory


Respiratory effort: normal


Respiratory: bilateral: CTA





- Cardiovascular


Rhythm: regular


Heart Sounds: Present: S1 & S2





- Gastrointestinal


General gastrointestinal: Present: soft, non-tender, non-distended





- Labs


CBC & Chem 7: 


                                 07/13/21 05:32





                                 07/13/21 05:32


Labs: 


                         Laboratory Results - last 24 hr











  07/12/21 07/12/21 07/12/21





  04:25 10:53 16:28


 


WBC   


 


RBC   


 


Hgb   


 


Hct   


 


MCV   


 


MCH   


 


MCHC   


 


RDW   


 


Plt Count  88 L  


 


Sodium   


 


Potassium   


 


Chloride   


 


Carbon Dioxide   


 


Anion Gap   


 


BUN   


 


Creatinine   


 


Estimated GFR   


 


BUN/Creatinine Ratio   


 


Glucose   


 


POC Glucose    146 H


 


Calcium   


 


Phosphorus   


 


Magnesium   


 


Total Bilirubin   


 


AST   


 


ALT   


 


Alkaline Phosphatase   


 


Total Protein   


 


Albumin   


 


Albumin/Globulin Ratio   


 


Blood Type   O POSITIVE 


 


Antibody Screen   Negative 


 


Crossmatch   See Detail 














  07/12/21 07/13/21 07/13/21





  21:07 05:32 05:32


 


WBC   7.3 


 


RBC   3.65 


 


Hgb   7.9 L 


 


Hct   23.8 L 


 


MCV   65 L 


 


MCH   22 L 


 


MCHC   33 


 


RDW   17.6 H 


 


Plt Count   94 L 


 


Sodium    140


 


Potassium    4.0


 


Chloride    100.9


 


Carbon Dioxide    26


 


Anion Gap    17


 


BUN    67 H


 


Creatinine    5.8 H


 


Estimated GFR    9


 


BUN/Creatinine Ratio    12


 


Glucose    106 H


 


POC Glucose  167 H  


 


Calcium    8.3 L


 


Phosphorus    6.50 H D


 


Magnesium    2.30


 


Total Bilirubin    0.80


 


AST    207 H


 


ALT    402 H


 


Alkaline Phosphatase    89


 


Total Protein    5.1 L


 


Albumin    3.0 L


 


Albumin/Globulin Ratio    1.4


 


Blood Type   


 


Antibody Screen   


 


Crossmatch

## 2021-07-13 NOTE — PROGRESS NOTE
Assessment and Plan


Assessment and plan: 


-- Anemia


Current Visit: Yes   Status: Acute   


Plan to address problem: 


Possibly from GI bleed.


We will monitor CBC.


Stool Hemoccult done in the ER was negative


Consult placed to gastroenterology for evaluation and recommendation.


Meanwhile patient will be placed on proton pump inhibitor.








--Acute kidney injury superimposed on chronic kidney disease


Current Visit: Yes   Status: Acute   


Plan to address problem: 


Patient has known history of for chronic kidney disease. 


Worsening renal function and creatinine, nephrology following


Permacath placement, hemodialysis per schedule


Patient may need outpatient HD chair placement at discharge





-- Hyperkalemia


Current Visit: Yes   Status: Acute   


Plan to address problem: 


Patient is received insulin and glucose, calcium gluconate sodium bicarb while 

in the  emergency room


We will monitor potassium levels and also monitor EKG.





--Elevated liver enzymes


Current Visit: Yes   Status: Acute   


Plan to address problem: 


Etiology unclear.  CT of the abdomen and pelvis did not reveal any acute 

abnormality.


Consult placed to gastroenterology for evaluation and recommendation.


Will monitor liver enzymes.





--GI bleed


Current Visit: Yes   Status: Acute   


Plan to address problem: 


GI evaluated the patient


Scheduled for endoscopy today


Closely monitor





--Hypertensive urgency


Current Visit: Yes   Status: Acute   


Moderate control , continue current antihypertensives 


As needed medications , closely monitor





-- Pulmonary edema


Current Visit: Yes   Status: Acute   


Plan to address problem: 


Pulmonary edema shown on CT scan of the abdomen and pelvis.


Patient had a dose of IV Lasix.


Will check echocardiogram.


We will place a consult to cardiology for evaluation.





--DVT prophylaxis


Current Visit: Yes   Status: Acute   


Plan to address problem: 


Patient placed on sequential compression device.





-- Full code status


Current Visit: Yes   Status: Acute   


Plan to address problem: 


Patient is full code.





Follow consultants recommendations














History


Interval history: 


Patient scheduled for endoscopy per GI for further evaluation of GI bleeding


And permacath placement for ESRD and dialysis


Seen and examined the patient at the bedside


Patient's chart and medications reviewed


No new complaints


Vital signs noted








Hospitalist Physical





- Constitutional


Vitals: 


                                        











Temp Pulse Resp BP Pulse Ox


 


 98.3 F   47 L  18   128/66   91 


 


 07/13/21 09:20  07/13/21 10:15  07/13/21 09:20  07/13/21 10:15  07/13/21 08:45











General appearance: Present: no acute distress, cachectic, other (Patient is 

lethargic, frail and chronically ill-appearing)





- EENT


Eyes: Present: PERRL, EOM intact





- Neck


Neck: Present: supple, normal ROM





- Respiratory


Respiratory effort: normal


Respiratory: bilateral: diminished, negative: rales, rhonchi, wheezing





- Cardiovascular


Rhythm: regular


Heart Sounds: Present: S1 & S2





- Extremities


Extremities: no ischemia, No edema





- Abdominal


General gastrointestinal: soft, non-tender, non-distended, normal bowel sounds





- Integumentary


Integumentary: Present: clear, warm





- Psychiatric


Psychiatric: appropriate mood/affect, cooperative





- Neurologic


Neurologic: CNII-XII intact, moves all extremities





Results





- Labs


CBC & Chem 7: 


                                 07/13/21 05:32





                                 07/13/21 05:32


Labs: 


                             Laboratory Last Values











WBC  7.3 K/mm3 (4.5-11.0)   07/13/21  05:32    


 


RBC  3.65 M/mm3 (3.65-5.03)   07/13/21  05:32    


 


Hgb  7.9 gm/dl (11.8-15.2)  L  07/13/21  05:32    


 


Hct  23.8 % (35.5-45.6)  L  07/13/21  05:32    


 


MCV  65 fl (84-94)  L  07/13/21  05:32    


 


MCH  22 pg (28-32)  L  07/13/21  05:32    


 


MCHC  33 % (32-34)   07/13/21  05:32    


 


RDW  17.6 % (13.2-15.2)  H  07/13/21  05:32    


 


Plt Count  94 K/mm3 (140-440)  L  07/13/21  05:32    


 


Add Manual Diff  Complete   07/12/21  04:25    


 


Total Counted  100   07/12/21  04:25    


 


Seg Neuts % (Manual)  87.0 % (40.0-70.0)  H  07/12/21  04:25    


 


Band Neutrophils %  1.0 %  07/12/21  04:25    


 


Lymphocytes % (Manual)  5.0 % (13.4-35.0)  L  07/12/21  04:25    


 


Monocytes % (Manual)  7.0 % (0.0-7.3)   07/12/21  04:25    


 


Nucleated RBC %  1.0 % (0.0-0.9)  H  07/12/21  04:25    


 


Seg Neutrophils # Man  7.0 K/mm3 (1.8-7.7)   07/12/21  04:25    


 


Band Neutrophils #  0.1 K/mm3  07/12/21  04:25    


 


Lymphocytes # (Manual)  0.4 K/mm3 (1.2-5.4)  L  07/12/21  04:25    


 


Abs React Lymphs (Man)  0.0 K/mm3  07/12/21  04:25    


 


Monocytes # (Manual)  0.6 K/mm3 (0.0-0.8)   07/12/21  04:25    


 


Eosinophils # (Manual)  0.0 K/mm3 (0.0-0.4)   07/12/21  04:25    


 


Basophils # (Manual)  0.0 K/mm3 (0.0-0.1)   07/12/21  04:25    


 


Metamyelocytes #  0.0 K/mm3  07/12/21  04:25    


 


Myelocytes #  0.0 K/mm3  07/12/21  04:25    


 


Promyelocytes #  0.0 K/mm3  07/12/21  04:25    


 


Blast Cells #  0.0 K/mm3  07/12/21  04:25    


 


WBC Morphology  Not Reportable   07/12/21  04:25    


 


Hypersegmented Neuts  Not Reportable   07/12/21  04:25    


 


Hyposegmented Neuts  Not Reportable   07/12/21  04:25    


 


Hypogranular Neuts  Not Reportable   07/12/21  04:25    


 


Smudge Cells  Not Reportable   07/12/21  04:25    


 


Toxic Granulation  Not Reportable   07/12/21  04:25    


 


Toxic Vacuolation  Not Reportable   07/12/21  04:25    


 


Dohle Bodies  Not Reportable   07/12/21  04:25    


 


Pelger-Huet Anomaly  Not Reportable   07/12/21  04:25    


 


Curt Rods  Not Reportable   07/12/21  04:25    


 


Platelet Estimate  Consistent w auto   07/12/21  04:25    


 


Clumped Platelets  Not Reportable   07/12/21  04:25    


 


Plt Clumps, EDTA  Not Reportable   07/12/21  04:25    


 


Large Platelets  Not Reportable   07/12/21  04:25    


 


Giant Platelets  Not Reportable   07/12/21  04:25    


 


Platelet Satelliting  Not Reportable   07/12/21  04:25    


 


Plt Morphology Comment  Not Reportable   07/12/21  04:25    


 


RBC Morphology  Not Reportable   07/12/21  04:25    


 


Dimorphic RBCs  Not Reportable   07/12/21  04:25    


 


Polychromasia  Few   07/12/21  04:25    


 


Hypochromasia  2+   07/12/21  04:25    


 


Poikilocytosis  Not Reportable   07/12/21  04:25    


 


Anisocytosis  2+   07/12/21  04:25    


 


Microcytosis  Not Reportable   07/12/21  04:25    


 


Macrocytosis  Not Reportable   07/12/21  04:25    


 


Spherocytes  Not Reportable   07/12/21  04:25    


 


Pappenheimer Bodies  Not Reportable   07/12/21  04:25    


 


Sickle Cells  Not Reportable   07/12/21  04:25    


 


Target Cells  Not Reportable   07/12/21  04:25    


 


Tear Drop Cells  Not Reportable   07/12/21  04:25    


 


Ovalocytes  Not Reportable   07/12/21  04:25    


 


Helmet Cells  Not Reportable   07/12/21  04:25    


 


[place] Bodies  Not Reportable   07/12/21  04:25    


 


Cabot Rings  Not Reportable   07/12/21  04:25    


 


Jackson Cells  Not Reportable   07/12/21  04:25    


 


Bite Cells  Not Reportable   07/12/21  04:25    


 


Crenated Cell  Not Reportable   07/12/21  04:25    


 


Elliptocytes  Not Reportable   07/12/21  04:25    


 


Acanthocytes (Spur)  Not Reportable   07/12/21  04:25    


 


Rouleaux  Not Reportable   07/12/21  04:25    


 


Hemoglobin C Crystals  Not Reportable   07/12/21  04:25    


 


Schistocytes  1+   07/12/21  04:25    


 


Malaria parasites  Not Reportable   07/12/21  04:25    


 


Bryan Bodies  Not Reportable   07/12/21  04:25    


 


Hem Pathologist Commnt  No   07/12/21  04:25    


 


ABG pH  7.384 pH Units (7.350-7.450)   07/11/21  14:40    


 


ABG pCO2  32.3 mm Hg  07/11/21  14:40    


 


ABG pO2  107.8 mm Hg (80.0-90.0)  H  07/11/21  14:40    


 


ABG HCO3  18.8 mmol/L (20.0-26.0)  L  07/11/21  14:40    


 


ABG O2 Saturation  97.9 % (95.0-99.0)   07/11/21  14:40    


 


ABG O2 Content  10.3  (0.0-44)   07/11/21  14:40    


 


ABG Base Excess  -5.6 mmol/L (-2.0-3.0)  L  07/11/21  14:40    


 


ABG Hemoglobin  7.4 gm/dl (14.0-18.0)  L  07/11/21  14:40    


 


ABG Carboxyhemoglobin  1.5 % (0.0-5.0)   07/11/21  14:40    


 


ABG Methemoglobin  0.5 % (0.0-1.5)   07/11/21  14:40    


 


Oxyhemoglobin  95.9 % (95.0-99.0)   07/11/21  14:40    


 


FiO2  28 %  07/11/21  14:40    


 


Sodium  140 mmol/L (137-145)   07/13/21  05:32    


 


Potassium  4.0 mmol/L (3.6-5.0)   07/13/21  05:32    


 


Chloride  100.9 mmol/L ()   07/13/21  05:32    


 


Carbon Dioxide  26 mmol/L (22-30)   07/13/21  05:32    


 


Anion Gap  17 mmol/L  07/13/21  05:32    


 


BUN  67 mg/dL (9-20)  H  07/13/21  05:32    


 


Creatinine  5.8 mg/dL (0.8-1.3)  H  07/13/21  05:32    


 


Estimated GFR  9 ml/min  07/13/21  05:32    


 


BUN/Creatinine Ratio  12 %  07/13/21  05:32    


 


Glucose  106 mg/dL ()  H  07/13/21  05:32    


 


POC Glucose  167 mg/dL ()  H  07/12/21  21:07    


 


Hemoglobin A1c  5.3 % (4-6)   07/11/21  02:00    


 


Calcium  8.3 mg/dL (8.4-10.2)  L  07/13/21  05:32    


 


Phosphorus  6.50 mg/dL (2.5-4.5)  H D 07/13/21  05:32    


 


Magnesium  2.30 mg/dL (1.7-2.3)   07/13/21  05:32    


 


Total Bilirubin  0.80 mg/dL (0.1-1.2)   07/13/21  05:32    


 


AST  207 units/L (5-40)  H  07/13/21  05:32    


 


ALT  402 units/L (7-56)  H  07/13/21  05:32    


 


Alkaline Phosphatase  89 units/L ()   07/13/21  05:32    


 


Total Protein  5.1 g/dL (6.3-8.2)  L  07/13/21  05:32    


 


Albumin  3.0 g/dL (3.9-5)  L  07/13/21  05:32    


 


Albumin/Globulin Ratio  1.4 %  07/13/21  05:32    


 


Lipase  51 units/L (13-60)   07/10/21  22:43    


 


PTH Intact  176.3 pg/mL (15-65)  H  07/12/21  04:25    


 


Urine Color  Yellow  (Yellow)   07/10/21  Unknown


 


Urine Turbidity  Cloudy  (Clear)   07/10/21  Unknown


 


Urine pH  5.0  (5.0-7.0)   07/10/21  Unknown


 


Ur Specific Gravity  1.018  (1.003-1.030)   07/10/21  Unknown


 


Urine Protein  >500 mg/dL (Negative)   07/10/21  Unknown


 


Urine Glucose (UA)  50 mg/dL (Negative)   07/10/21  Unknown


 


Urine Ketones  Neg mg/dL (Negative)   07/10/21  Unknown


 


Urine Blood  Mod  (Negative)   07/10/21  Unknown


 


Urine Nitrite  Neg  (Negative)   07/10/21  Unknown


 


Urine Bilirubin  Neg  (Negative)   07/10/21  Unknown


 


Urine Urobilinogen  < 2.0 mg/dL (<2.0)   07/10/21  Unknown


 


Ur Leukocyte Esterase  Neg  (Negative)   07/10/21  Unknown


 


Urine WBC (Auto)  4.0 /HPF (0.0-6.0)   07/10/21  Unknown


 


Urine RBC (Auto)  10.0 /HPF (0.0-6.0)   07/10/21  Unknown


 


U Epithel Cells (Auto)  1.0 /HPF (0-13.0)   07/10/21  Unknown


 


Urine Mucus  Few /HPF  07/10/21  Unknown


 


Urine Creatinine  78.5 mg/dL (0.1-20.0)  H  07/11/21  15:40    


 


Protein/Creatinin Ratio  9.49   07/11/21  15:40    


 


Urine Sodium  76 mmol/L  07/11/21  15:40    


 


Urine Total Protein  745 mg/dL (5-11.8)  H  07/11/21  15:40    


 


Hepatitis A IgM Ab  Non-reactive  (NonReactive)   07/11/21  00:00    


 


Hep Bs Antigen  Non-reactive  (Negative)   07/11/21  00:00    


 


Hep B Core IgM Ab  Non-reactive  (NonReactive)   07/11/21  00:00    


 


Hepatitis C Antibody  Non-reactive  (NonReactive)   07/11/21  00:00    


 


Blood Type  O POSITIVE   07/12/21  10:53    


 


Antibody Screen  Negative   07/12/21  10:53    


 


Crossmatch  See Detail   07/12/21  10:53    











Malave/IV: 


                                        





Voiding Method                   Urinal











Active Medications





- Current Medications


Current Medications: 














Generic Name Dose Route Start Last Admin





  Trade Name Freq  PRN Reason Stop Dose Admin


 


Acetaminophen  650 mg  07/11/21 02:17 





  Acetaminophen 325 Mg Tab  PO  





  Q6H PRN  





  Pain MILD(1-3)/Fever >100.5/HA  


 


Amlodipine Besylate  10 mg  07/12/21 13:00  07/12/21 16:54





  Amlodipine 10 Mg Tab  PO   10 mg





  QDAY SOTERO   Administration


 


Aspirin  81 mg  07/12/21 10:00  07/12/21 11:49





  Aspirin Ec 81 Mg Tab  PO   81 mg





  QDAY SOTERO   Administration


 


Calcium Acetate  667 mg  07/13/21 12:00 





  Calcium Acetate 667 Mg Cap  PO  





  [facility]ID[facility]  


 


Carvedilol  12.5 mg  07/11/21 22:00  07/12/21 21:54





  Carvedilol 12.5 Mg Tab  PO   12.5 mg





  BID SOTERO   Administration


 


Dextrose  0 ml  07/11/21 02:17 





  Dextrose 50% In Water (25gm) 50 Ml Syringe  IV  





  Q30MIN PRN  





  Hypoglycemia  





  Protocol  


 


Furosemide  80 mg  07/14/21 10:00 





  Furosemide 40 Mg Tab  PO  





  QDAY [facility]  


 


Hydralazine HCl  10 mg  07/11/21 06:13  07/11/21 10:10





  Hydralazine 20 Mg/1 Ml Inj  IV   10 mg





  Q4H PRN   Administration





  Blood Pressure  


 


Sodium Chloride  100 mls @ 999 mls/hr  07/12/21 10:00 





  Nacl 0.9%  IV  





  LIBIA PRN  





  Hypotension  


 


Insulin Human Regular  0 units  07/11/21 07:30  07/13/21 07:46





  Insulin Regular, Human 100 Units/1 Ml  SUB-Q   Not Given





  [facility]  





  Protocol  


 


Magnesium Hydroxide  30 ml  07/11/21 02:17 





  Magnesium Hydroxide (Mom) Oral Liqd Udc  PO  





  Q4H PRN  





  Constipation  


 


Nitroglycerin  0.4 mg  07/13/21 06:00  07/13/21 05:35





  Nitroglycerin 0.4 Mg Patch 24hr  TD   0.4 mg





  QDAY@0600 SOTERO   Administration


 


Ondansetron HCl  4 mg  07/11/21 02:17 





  Ondansetron 4 Mg/2 Ml Inj  IV  





  Q8H PRN  





  Nausea And Vomiting  


 


Pantoprazole Sodium  40 mg  07/11/21 10:00  07/12/21 21:54





  Pantoprazole 40 Mg Inj  IV   40 mg





  BID SOTERO   Administration


 


Sodium Chloride  10 ml  07/11/21 10:00  07/12/21 21:55





  Sodium Chloride 0.9% 10 Ml Flush Syringe  IV   10 ml





  BID SOTERO   Administration


 


Sodium Chloride  10 ml  07/11/21 02:17 





  Sodium Chloride 0.9% 10 Ml Flush Syringe  IV  





  PRN PRN  





  LINE FLUSH  














Nutrition/Malnutrition Assess





- Dietary Evaluation


Nutrition/Malnutrition Findings: 


                                        





Nutrition Notes                                            Start:  07/11/21 

12:02


Freq:                                                      Status: Active       




Protocol:                                                                       




 Document     07/12/21 12:01    (Rec: 07/12/21 12:04    [id number])


 Nutrition Notes


     Need for Assessment generated from:         RN Referral


     Initial or Follow up                        Reassessment


     Current Diagnosis                           Acute Kidney Injury,Diabetes,


                                                 Hypertension,Heart Failure,


                                                 Hyperlipidemia


     Other Pertinent Diagnosis                   elevated LFTs, GIB, anemia


     Current Diet                                Cardiac, Consistent CHO


     Labs/Tests                                  BUN 89


                                                 Cr 7.6


     Pertinent Medications                       Reviewed


     Height                                      5 ft 5 in


     Weight                                      51.3 kg


     Ideal Body Weight (kg)                      61.81


     BMI                                         18.8


     Weight Status                               Underweight


     Subjective/Other Information                RN screen for MST. Already


                                                 following pt. He is more


                                                 confused today. He ate 50% of


                                                 breakfast and sips of ONS.


     Percent of energy/protein needs met:        71%/69%


     Burn                                        Absent


     Trauma                                      Absent


     GI Symptoms                                 None


     Current % PO                                Poor (25-49%)


     Minimum of two criteria                     Yes


     Energy Intake (non-severe)                  <75% Estimated Energy


                                                 Requirement >7 days


     Muscle Mass                                 Mild Depletion (non-severe)


     #1


      Nutrition Diagnosis                        Malnutrition


      As Evidenced by Signs and Symptoms         pt ate 50% of breakfast


      Diagnosis Progress(for reassessment        Improved


       documentation)                            


     Is patient on ventilator?                   No


     Is Patient Ambulatory and/or Out of Bed     No


     REE-([place]-St[place] Jeor-confined to bed)      [phone]


     Calculation Used for Recommendations        [place] Jeor


     Additional Notes                            Protein: (1.2-1.5g/kg) 62-77g


                                                 Fluid: 1 ml/kcal or per MD


 Nutrition Intervention


     Change Diet Order:                          Continue


     Add Supplement/Snack (indicate name/kcal    Nepro BID


      /protein )                                 


     Provides kCal:                              850


     Provides Protein (gm)                       38


     Goal #1                                     Meet at least 80% of protein


                                                 and energy needs via PO and


                                                 ONS intakes


     Goal #2                                     Weight gain/maintenance


     Anticipated Discharge Needs:                Cardiac, Consistent CHO


     Follow-Up By:                               07/14/21


     Additional Comments                         FU for intakes, ONS tolerance

## 2021-07-13 NOTE — OPERATIVE REPORT
Operative Report


Operative Report: 





EXAM:


1.  Ultrasound-guided puncture of the left internal jugular vein


2.  Fluoroscopic-guided placement of a left internal jugular tunneled cuffed 

hemodialysis catheter.


 


DATE: 7/13/2021


 


INDICATION: End-stage renal disease requiring hemodialysis access


 


MEDICATIONS: Please see nursing report for full details.


 


DEVICES: 27 cm tip to cuff 15 Fr dual lumen hemodialysis catheter


 


: JULIA SCHILLING MD


 


CONTRAST: None


 


PROCEDURE: 


 


The risks, benefits, and alternatives were discussed and informed consent was 

obtained.  The patient was transported to the angiography suite in 

satisfactory/stable condition and was transported onto the angiography table.  

The patient's left internal jugular vein was assessed with ultrasound and 

determined to be patent prior to procedure.  The patient was prepped and draped 

in a sterile fashion.


 


The puncture site was anesthetized. Under sonographic guidance, the left 

internal jugular vein was punctured with a 21-gauge micropuncture needle and a 

0.018 inch wire was advanced into the inferior vena cava.  The micropuncture 

needle was exchanged for a transitional dilator and the wire was retracted into 

the right atrium to karo intravascular distance.  The wire and inner dilator 

were removed.  0.035 inch Amplatz wire was advanced through the transitional d

ilator into the inferior vena cava.


 


A suitable exit site was identified on the patient's chest inferior and lateral 

to the venotomy.  The site was anesthetized with local anesthetic and the track 

was anesthetized.  Dermatotomy was made.  The PermCath was attached to the 

tunneling device and tunneled between the dermatotomy to the venotomy.


 


Over the 0.035 inch wire, serial dilatation was performed with ultimate 

placement of a peel-away sheath.  The catheter was advanced through the peel-

away sheath after the wire was removed and positioned centrally under 

fluoroscopic guidance.  The peel-away sheath was removed.


 


4-0 Vicryl suture was used to close the venotomy and Dermabond was then applied.

 2-0 Ethilon suture was used to secure the catheter at the dermatotomy.  The 

catheter was charged with heparin 1000 units/mL of dead space.  Sterile dressing

and Biopatch applied.


 


The patient was transferred from the angiography suite back to the floor in 

stable condition.  


 


FINDINGS: 


1.  Excellent flow was obtained through the dialysis catheter with 20 mL 

syringes.


2.  The catheter tip is in the right atrium.


 


IMPRESSION:


1.  Successful ultrasound and fluoroscopically guided placement of a left 

internal jugular tunneled cuffed hemodialysis catheter.

## 2021-07-13 NOTE — PROGRESS NOTE
Assessment and Plan


1. Acute kidney injury superimposed on CKD:


EDE superimposed on CKD .


CT abdomen negative for hydro.


Urine studies ordered.


Monitor renal function.


Renal prognosis is guarded to poor.


Avoid nephrotoxic agents.


Meds dosage based on GFR.


Patient was started on hemodialysis due to significant decline in the GFR, 

associated hyperkalemia, metabolic acidosis and volume overload.


Hemodialysis: 7/12, 7/13.





2. FEN:


Hyperkalemia, improved, monitor.


Anion-gap Metabolic acidosis, HD today, monitor.


Volume overload, UF with HD, resume IV lasix.


Monitor lytes.





3. Heavy proteinuria:


?etiology.


STEVE, ANCA, GBm Ab, Complements, UPEP and SPEP ordered.





4. Decompensated CHF:


Echo EF 45-50%.


Strict I/O.


On Carvedilol.


Card consulted.





5. Elevated liver enzymes:


Hepatitis serologies negative.





6. GI bleed:


Per GI EGD in the next few days, after CHF/HTN/EDE has been assessed and 

improved.





7. Hypertensive urgency:


BP controlled.


UF with HD as tolerated.


Adjust meds as needed.


Monitor.





8. Hypochromic Anemia, POA:


?2/2 GI bleed.


Followed by GI.


Epogen.


Monitor.





9. Thrombocytopenia.





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











Subjective:


Patient was seen and examined at the bedside while on HD.











Examination:


General appearance: well-developed, thin built, appears stated age, not in 

distress


HEENT: ATNC, pupils equal


Neck: supple


Respiratory: diminished breath sounds bibasal


Cardiology: regular, S1S2, no murmur


Gastrointestinal: soft, bowel sounds heard, not tender


Integumentary: warm and dry, upper chest hypopigmented patches noted


Neurologic: alert, moving extremities


Ext: no edema


Hemodialysis access: R IJ temp catheter with slight oozing of blood








Subjective


Date of service: 07/13/21


Principal diagnosis: Anemia, Abnormal LFTs





Objective





- Vital Signs


Vital signs: 


                               Vital Signs - 12hr











  07/12/21 07/12/21 07/12/21





  21:40 21:54 21:55


 


Temperature   


 


Pulse Rate  113 H 113 H


 


Respiratory   





Rate   


 


Blood Pressure  106/77 106/77


 


O2 Sat by Pulse 92  





Oximetry   














  07/12/21 07/13/21 07/13/21





  23:12 00:00 04:00


 


Temperature 97.3 F L  


 


Pulse Rate 41 L 77 69


 


Respiratory 20 20 





Rate   


 


Blood Pressure 128/76  


 


O2 Sat by Pulse 93 97 





Oximetry   














  07/13/21 07/13/21 07/13/21





  04:36 05:35 07:32


 


Temperature 98.3 F  98.0 F


 


Pulse Rate 67 69 38 L


 


Respiratory 16  16





Rate   


 


Blood Pressure 136/77 136/77 126/62


 


O2 Sat by Pulse 93  93





Oximetry   














  07/13/21 07/13/21





  07:47 08:45


 


Temperature  


 


Pulse Rate  


 


Respiratory 20 





Rate  


 


Blood Pressure  


 


O2 Sat by Pulse 97 91





Oximetry  














- Lab





                                 07/13/21 05:32





                                 07/13/21 05:32


                             Most recent lab results











ABG pH  7.384 pH Units (7.350-7.450)   07/11/21  14:40    


 


ABG pCO2  32.3 mm Hg  07/11/21  14:40    


 


ABG pO2  107.8 mm Hg (80.0-90.0)  H  07/11/21  14:40    


 


ABG HCO3  18.8 mmol/L (20.0-26.0)  L  07/11/21  14:40    


 


ABG O2 Saturation  97.9 % (95.0-99.0)   07/11/21  14:40    


 


Calcium  8.3 mg/dL (8.4-10.2)  L  07/13/21  05:32    


 


Phosphorus  6.50 mg/dL (2.5-4.5)  H D 07/13/21  05:32    


 


Magnesium  2.30 mg/dL (1.7-2.3)   07/13/21  05:32    


 


Urine Creatinine  78.5 mg/dL (0.1-20.0)  H  07/11/21  15:40    


 


Urine Sodium  76 mmol/L  07/11/21  15:40    


 


Urine Total Protein  745 mg/dL (5-11.8)  H  07/11/21  15:40    














Medications & Allergies





- Medications


Allergies/Adverse Reactions: 


                                    Allergies





No Known Allergies Allergy (Unverified 01/17/15 01:13)


   








Home Medications: 


                                Home Medications











 Medication  Instructions  Recorded  Confirmed  Last Taken  Type


 


Amlodipine Besylate [Norvasc] 10 mg PO QDAY 07/10/21 07/10/21 Unknown History


 


Aspirin EC [Halfprin EC] 81 mg PO QDAY 07/10/21 07/10/21 Unknown History


 


Atorvastatin Calcium [Lipitor] 80 mg PO QDAY 07/10/21 07/10/21 Unknown History


 


Lisinopril [Zestril] 10 mg PO QDAY 07/10/21 07/10/21 Unknown History











Active Medications: 














Generic Name Dose Route Start Last Admin





  Trade Name Freq  PRN Reason Stop Dose Admin


 


Acetaminophen  650 mg  07/11/21 02:17 





  Acetaminophen 325 Mg Tab  PO  





  Q6H PRN  





  Pain MILD(1-3)/Fever >100.5/HA  


 


Amlodipine Besylate  10 mg  07/12/21 13:00  07/12/21 16:54





  Amlodipine 10 Mg Tab  PO   10 mg





  QDAY SOTERO   Administration


 


Aspirin  81 mg  07/12/21 10:00  07/12/21 11:49





  Aspirin Ec 81 Mg Tab  PO   81 mg





  QDAY SOTERO   Administration


 


Carvedilol  12.5 mg  07/11/21 22:00  07/12/21 21:54





  Carvedilol 12.5 Mg Tab  PO   12.5 mg





  BID SOTERO   Administration


 


Dextrose  0 ml  07/11/21 02:17 





  Dextrose 50% In Water (25gm) 50 Ml Syringe  IV  





  Q30MIN PRN  





  Hypoglycemia  





  Protocol  


 


Furosemide  80 mg  07/12/21 13:00  07/12/21 16:45





  Furosemide 40 Mg/4 Ml Inj  IV   80 mg





  DAILY SOTERO   Administration


 


Hydralazine HCl  10 mg  07/11/21 06:13  07/11/21 10:10





  Hydralazine 20 Mg/1 Ml Inj  IV   10 mg





  Q4H PRN   Administration





  Blood Pressure  


 


Hydralazine HCl  50 mg  07/11/21 18:00  07/13/21 05:35





  Hydralazine 25 Mg Tab  PO   50 mg





  Q8HR SOTERO   Administration


 


Sodium Chloride  100 mls @ 999 mls/hr  07/12/21 10:00 





  Nacl 0.9%  IV  





  LIBIA PRN  





  Hypotension  


 


Insulin Human Regular  0 units  07/11/21 07:30  07/13/21 07:46





  Insulin Regular, Human 100 Units/1 Ml  SUB-Q   Not Given





  ACHS SOTERO  





  Protocol  


 


Magnesium Hydroxide  30 ml  07/11/21 02:17 





  Magnesium Hydroxide (Mom) Oral Liqd Udc  PO  





  Q4H PRN  





  Constipation  


 


Nitroglycerin  0.4 mg  07/13/21 06:00  07/13/21 05:35





  Nitroglycerin 0.4 Mg Patch 24hr  TD   0.4 mg





  QDAY@0600 SOTERO   Administration


 


Ondansetron HCl  4 mg  07/11/21 02:17 





  Ondansetron 4 Mg/2 Ml Inj  IV  





  Q8H PRN  





  Nausea And Vomiting  


 


Pantoprazole Sodium  40 mg  07/11/21 10:00  07/12/21 21:54





  Pantoprazole 40 Mg Inj  IV   40 mg





  BID SOTERO   Administration


 


Sodium Chloride  10 ml  07/11/21 10:00  07/12/21 21:55





  Sodium Chloride 0.9% 10 Ml Flush Syringe  IV   10 ml





  BID SOTERO   Administration


 


Sodium Chloride  10 ml  07/11/21 02:17 





  Sodium Chloride 0.9% 10 Ml Flush Syringe  IV  





  PRN PRN  





  LINE FLUSH

## 2021-07-14 RX ADMIN — PANTOPRAZOLE SODIUM SCH MG: 40 INJECTION, POWDER, FOR SOLUTION INTRAVENOUS at 21:34

## 2021-07-14 RX ADMIN — Medication SCH ML: at 10:33

## 2021-07-14 RX ADMIN — INSULIN HUMAN SCH UNITS: 100 INJECTION, SOLUTION PARENTERAL at 10:33

## 2021-07-14 RX ADMIN — CALCIUM ACETATE SCH MG: 667 CAPSULE ORAL at 12:30

## 2021-07-14 RX ADMIN — INSULIN HUMAN SCH: 100 INJECTION, SOLUTION PARENTERAL at 21:34

## 2021-07-14 RX ADMIN — NITROGLYCERIN TRANSDERMAL SYSTEM SCH MG: 0.4 PATCH, EXTENDED RELEASE TRANSDERMAL at 06:04

## 2021-07-14 RX ADMIN — FUROSEMIDE SCH MG: 40 TABLET ORAL at 10:33

## 2021-07-14 RX ADMIN — PANTOPRAZOLE SODIUM SCH MG: 40 INJECTION, POWDER, FOR SOLUTION INTRAVENOUS at 10:33

## 2021-07-14 RX ADMIN — INSULIN HUMAN SCH UNITS: 100 INJECTION, SOLUTION PARENTERAL at 14:05

## 2021-07-14 RX ADMIN — ASPIRIN SCH MG: 81 TABLET, COATED ORAL at 10:33

## 2021-07-14 RX ADMIN — CALCIUM ACETATE SCH MG: 667 CAPSULE ORAL at 10:33

## 2021-07-14 RX ADMIN — CALCIUM ACETATE SCH MG: 667 CAPSULE ORAL at 17:28

## 2021-07-14 RX ADMIN — Medication SCH ML: at 21:35

## 2021-07-14 RX ADMIN — INSULIN HUMAN SCH UNITS: 100 INJECTION, SOLUTION PARENTERAL at 17:28

## 2021-07-14 NOTE — PROGRESS NOTE
Assessment and Plan


Assessment and plan: 


-- Anemia


Current Visit: Yes   Status: Acute   


Possibly from GI bleed.


Received 1 unit of PRBC transfusion, Hb today 7.9


Stool Hemoccult done in the ER was negative


Closely monitor H&H transfuse additional PRBC as needed





--GI bleed


Current Visit: Yes   Status: Acute   


GI evaluated the patient


Scheduled for endoscopy today


Cardiology did not cleared for the procedure at this point


Unless actively bleeding due to risk factors





--Acute kidney injury superimposed on chronic kidney disease


Current Visit: Yes   Status: Acute   


Patient  with chronic kidney disease.  Worsening renal function


s/p Permacath placement, hemodialysis 7/12, 7/13


Nephrology following, outpatient HD chair placement at discharge per renal





-- Hyperkalemia/present on admission


Current Visit: Yes   Status: Acute   


Patient is received insulin and glucose, calcium gluconate sodium bicarb while 

in the  emergency room


Normal potassium levels, monitor electrolytes





--Acute liver injury /transaminitis


Current Visit: Yes   Status: Acute   


Plan to address problem: 


CT of the abdomen and pelvis did not reveal any acute abnormality.


Trending down.  GI following, etiology unclear





--Hypertensive urgency


Current Visit: Yes   Status: Acute   


Moderate control , continue current antihypertensives 


As needed medications , closely monitor





-- Pulmonary edema


Current Visit: Yes   Status: Acute   


Plan to address problem: 


Pulmonary edema shown on CT scan of the abdomen and pelvis.


Patient had a dose of IV Lasix.


Will check echocardiogram.


We will place a consult to cardiology for evaluation.





--DVT prophylaxis


Current Visit: Yes   Status: Acute   


Plan to address problem: 


Patient placed on sequential compression device.





-- Full code status


Current Visit: Yes   Status: Acute   


Plan to address problem: 


Patient is full code.





Follow consultants recommendations








7/13/2021; patient received permacath placement


Hemodialysis per schedule, possible endoscopy tomorrow





7/14/2021; patient feels slightly better, GI waiting for


Cardiology clearance for endoscopy














History


Interval history: 


I have seen and examined the patient at the bedside


Patient's chart and medications reviewed


Patient received permacath placement yesterday.  


GI planning EGD when cardiology clears for the procedure


Vital signs noted








Hospitalist Physical





- Constitutional


Vitals: 


                                        











Temp Pulse Resp BP Pulse Ox


 


 97.9 F   61   18   94/56   97 


 


 07/14/21 15:45  07/14/21 15:45  07/14/21 15:45  07/14/21 15:45  07/14/21 15:45











General appearance: Present: no acute distress, cachectic, other (Patient is 

lethargic, frail and chronically ill-appearing)





- EENT


Eyes: Present: PERRL, EOM intact





- Neck


Neck: Present: supple, normal ROM





- Respiratory


Respiratory effort: normal


Respiratory: bilateral: diminished, rhonchi, negative: rales, wheezing





- Cardiovascular


Rhythm: regular


Heart Sounds: Present: S1 & S2





- Extremities


Extremities: no ischemia, No edema





- Abdominal


General gastrointestinal: soft, non-tender, non-distended, normal bowel sounds





- Integumentary


Integumentary: Present: clear, warm





- Psychiatric


Psychiatric: appropriate mood/affect, cooperative





- Neurologic


Neurologic: CNII-XII intact, moves all extremities





Results





- Labs


CBC & Chem 7: 


                                 07/13/21 05:32





                                 07/13/21 05:32


Labs: 


                             Laboratory Last Values











WBC  7.3 K/mm3 (4.5-11.0)   07/13/21  05:32    


 


RBC  3.65 M/mm3 (3.65-5.03)   07/13/21  05:32    


 


Hgb  7.9 gm/dl (11.8-15.2)  L  07/13/21  05:32    


 


Hct  23.8 % (35.5-45.6)  L  07/13/21  05:32    


 


MCV  65 fl (84-94)  L  07/13/21  05:32    


 


MCH  22 pg (28-32)  L  07/13/21  05:32    


 


MCHC  33 % (32-34)   07/13/21  05:32    


 


RDW  17.6 % (13.2-15.2)  H  07/13/21  05:32    


 


Plt Count  94 K/mm3 (140-440)  L  07/13/21  05:32    


 


Add Manual Diff  Complete   07/12/21  04:25    


 


Total Counted  100   07/12/21  04:25    


 


Seg Neuts % (Manual)  87.0 % (40.0-70.0)  H  07/12/21  04:25    


 


Band Neutrophils %  1.0 %  07/12/21  04:25    


 


Lymphocytes % (Manual)  5.0 % (13.4-35.0)  L  07/12/21  04:25    


 


Monocytes % (Manual)  7.0 % (0.0-7.3)   07/12/21  04:25    


 


Nucleated RBC %  1.0 % (0.0-0.9)  H  07/12/21  04:25    


 


Seg Neutrophils # Man  7.0 K/mm3 (1.8-7.7)   07/12/21  04:25    


 


Band Neutrophils #  0.1 K/mm3  07/12/21  04:25    


 


Lymphocytes # (Manual)  0.4 K/mm3 (1.2-5.4)  L  07/12/21  04:25    


 


Abs React Lymphs (Man)  0.0 K/mm3  07/12/21  04:25    


 


Monocytes # (Manual)  0.6 K/mm3 (0.0-0.8)   07/12/21  04:25    


 


Eosinophils # (Manual)  0.0 K/mm3 (0.0-0.4)   07/12/21  04:25    


 


Basophils # (Manual)  0.0 K/mm3 (0.0-0.1)   07/12/21  04:25    


 


Metamyelocytes #  0.0 K/mm3  07/12/21  04:25    


 


Myelocytes #  0.0 K/mm3  07/12/21  04:25    


 


Promyelocytes #  0.0 K/mm3  07/12/21  04:25    


 


Blast Cells #  0.0 K/mm3  07/12/21  04:25    


 


WBC Morphology  Not Reportable   07/12/21  04:25    


 


Hypersegmented Neuts  Not Reportable   07/12/21  04:25    


 


Hyposegmented Neuts  Not Reportable   07/12/21  04:25    


 


Hypogranular Neuts  Not Reportable   07/12/21  04:25    


 


Smudge Cells  Not Reportable   07/12/21  04:25    


 


Toxic Granulation  Not Reportable   07/12/21  04:25    


 


Toxic Vacuolation  Not Reportable   07/12/21  04:25    


 


Dohle Bodies  Not Reportable   07/12/21  04:25    


 


Pelger-Huet Anomaly  Not Reportable   07/12/21  04:25    


 


Curt Rods  Not Reportable   07/12/21  04:25    


 


Platelet Estimate  Consistent w auto   07/12/21  04:25    


 


Clumped Platelets  Not Reportable   07/12/21  04:25    


 


Plt Clumps, EDTA  Not Reportable   07/12/21  04:25    


 


Large Platelets  Not Reportable   07/12/21  04:25    


 


Giant Platelets  Not Reportable   07/12/21  04:25    


 


Platelet Satelliting  Not Reportable   07/12/21  04:25    


 


Plt Morphology Comment  Not Reportable   07/12/21  04:25    


 


RBC Morphology  Not Reportable   07/12/21  04:25    


 


Dimorphic RBCs  Not Reportable   07/12/21  04:25    


 


Polychromasia  Few   07/12/21  04:25    


 


Hypochromasia  2+   07/12/21  04:25    


 


Poikilocytosis  Not Reportable   07/12/21  04:25    


 


Anisocytosis  2+   07/12/21  04:25    


 


Microcytosis  Not Reportable   07/12/21  04:25    


 


Macrocytosis  Not Reportable   07/12/21  04:25    


 


Spherocytes  Not Reportable   07/12/21  04:25    


 


Pappenheimer Bodies  Not Reportable   07/12/21  04:25    


 


Sickle Cells  Not Reportable   07/12/21  04:25    


 


Target Cells  Not Reportable   07/12/21  04:25    


 


Tear Drop Cells  Not Reportable   07/12/21  04:25    


 


Ovalocytes  Not Reportable   07/12/21  04:25    


 


Helmet Cells  Not Reportable   07/12/21  04:25    


 


Negrete-Oatman Bodies  Not Reportable   07/12/21  04:25    


 


Cabot Rings  Not Reportable   07/12/21  04:25    


 


Bronx Cells  Not Reportable   07/12/21  04:25    


 


Bite Cells  Not Reportable   07/12/21  04:25    


 


Crenated Cell  Not Reportable   07/12/21  04:25    


 


Elliptocytes  Not Reportable   07/12/21  04:25    


 


Acanthocytes (Spur)  Not Reportable   07/12/21  04:25    


 


Rouleaux  Not Reportable   07/12/21  04:25    


 


Hemoglobin C Crystals  Not Reportable   07/12/21  04:25    


 


Schistocytes  1+   07/12/21  04:25    


 


Malaria parasites  Not Reportable   07/12/21  04:25    


 


Bryan Bodies  Not Reportable   07/12/21  04:25    


 


Hem Pathologist Commnt  No   07/12/21  04:25    


 


PT  18.4 Sec. (12.2-14.9)  H  07/13/21  19:20    


 


INR  1.48  (0.87-1.13)  H  07/13/21  19:20    


 


APTT  33.8 Sec. (24.2-36.6)   07/13/21  19:20    


 


ABG pH  7.384 pH Units (7.350-7.450)   07/11/21  14:40    


 


ABG pCO2  32.3 mm Hg  07/11/21  14:40    


 


ABG pO2  107.8 mm Hg (80.0-90.0)  H  07/11/21  14:40    


 


ABG HCO3  18.8 mmol/L (20.0-26.0)  L  07/11/21  14:40    


 


ABG O2 Saturation  97.9 % (95.0-99.0)   07/11/21  14:40    


 


ABG O2 Content  10.3  (0.0-44)   07/11/21  14:40    


 


ABG Base Excess  -5.6 mmol/L (-2.0-3.0)  L  07/11/21  14:40    


 


ABG Hemoglobin  7.4 gm/dl (14.0-18.0)  L  07/11/21  14:40    


 


ABG Carboxyhemoglobin  1.5 % (0.0-5.0)   07/11/21  14:40    


 


ABG Methemoglobin  0.5 % (0.0-1.5)   07/11/21  14:40    


 


Oxyhemoglobin  95.9 % (95.0-99.0)   07/11/21  14:40    


 


FiO2  28 %  07/11/21  14:40    


 


Sodium  140 mmol/L (137-145)   07/13/21  05:32    


 


Potassium  4.0 mmol/L (3.6-5.0)   07/13/21  05:32    


 


Chloride  100.9 mmol/L ()   07/13/21  05:32    


 


Carbon Dioxide  26 mmol/L (22-30)   07/13/21  05:32    


 


Anion Gap  17 mmol/L  07/13/21  05:32    


 


BUN  67 mg/dL (9-20)  H  07/13/21  05:32    


 


Creatinine  5.8 mg/dL (0.8-1.3)  H  07/13/21  05:32    


 


Estimated GFR  9 ml/min  07/13/21  05:32    


 


BUN/Creatinine Ratio  12 %  07/13/21  05:32    


 


Glucose  106 mg/dL ()  H  07/13/21  05:32    


 


POC Glucose  173 mg/dL ()  H  07/14/21  15:50    


 


Hemoglobin A1c  5.3 % (4-6)   07/11/21  02:00    


 


Calcium  8.3 mg/dL (8.4-10.2)  L  07/13/21  05:32    


 


Phosphorus  6.50 mg/dL (2.5-4.5)  H D 07/13/21  05:32    


 


Magnesium  2.30 mg/dL (1.7-2.3)   07/13/21  05:32    


 


Total Bilirubin  0.80 mg/dL (0.1-1.2)   07/13/21  05:32    


 


AST  207 units/L (5-40)  H  07/13/21  05:32    


 


ALT  402 units/L (7-56)  H  07/13/21  05:32    


 


Alkaline Phosphatase  89 units/L ()   07/13/21  05:32    


 


Total Protein  5.1 g/dL (6.3-8.2)  L  07/13/21  05:32    


 


Albumin  3.0 g/dL (3.9-5)  L  07/13/21  05:32    


 


Albumin/Globulin Ratio  1.4 %  07/13/21  05:32    


 


Lipase  51 units/L (13-60)   07/10/21  22:43    


 


PTH Intact  176.3 pg/mL (15-65)  H  07/12/21  04:25    


 


Urine Color  Yellow  (Yellow)   07/10/21  Unknown


 


Urine Turbidity  Cloudy  (Clear)   07/10/21  Unknown


 


Urine pH  5.0  (5.0-7.0)   07/10/21  Unknown


 


Ur Specific Gravity  1.018  (1.003-1.030)   07/10/21  Unknown


 


Urine Protein  >500 mg/dL (Negative)   07/10/21  Unknown


 


Urine Glucose (UA)  50 mg/dL (Negative)   07/10/21  Unknown


 


Urine Ketones  Neg mg/dL (Negative)   07/10/21  Unknown


 


Urine Blood  Mod  (Negative)   07/10/21  Unknown


 


Urine Nitrite  Neg  (Negative)   07/10/21  Unknown


 


Urine Bilirubin  Neg  (Negative)   07/10/21  Unknown


 


Urine Urobilinogen  < 2.0 mg/dL (<2.0)   07/10/21  Unknown


 


Ur Leukocyte Esterase  Neg  (Negative)   07/10/21  Unknown


 


Urine WBC (Auto)  4.0 /HPF (0.0-6.0)   07/10/21  Unknown


 


Urine RBC (Auto)  10.0 /HPF (0.0-6.0)   07/10/21  Unknown


 


U Epithel Cells (Auto)  1.0 /HPF (0-13.0)   07/10/21  Unknown


 


Urine Mucus  Few /HPF  07/10/21  Unknown


 


Urine Creatinine  78.5 mg/dL (0.1-20.0)  H  07/11/21  15:40    


 


Protein/Creatinin Ratio  9.49   07/11/21  15:40    


 


Urine Sodium  76 mmol/L  07/11/21  15:40    


 


Urine Total Protein  745 mg/dL (5-11.8)  H  07/11/21  15:40    


 


Hepatitis A IgM Ab  Non-reactive  (NonReactive)   07/11/21  00:00    


 


Hep Bs Antigen  Non-reactive  (Negative)   07/11/21  00:00    


 


Hep B Core IgM Ab  Non-reactive  (NonReactive)   07/11/21  00:00    


 


Hepatitis C Antibody  Non-reactive  (NonReactive)   07/11/21  00:00    


 


Blood Type  O POSITIVE   07/12/21  10:53    


 


Antibody Screen  Negative   07/12/21  10:53    


 


Crossmatch  See Detail   07/12/21  10:53    











Malave/IV: 


                                        





Voiding Method                   Urinal











Active Medications





- Current Medications


Current Medications: 














Generic Name Dose Route Start Last Admin





  Trade Name Freq  PRN Reason Stop Dose Admin


 


Acetaminophen  650 mg  07/11/21 02:17 





  Acetaminophen 325 Mg Tab  PO  





  Q6H PRN  





  Pain MILD(1-3)/Fever >100.5/HA  


 


Amlodipine Besylate  10 mg  07/12/21 13:00  07/14/21 10:33





  Amlodipine 10 Mg Tab  PO   10 mg





  QDAY SOTERO   Administration


 


Aspirin  81 mg  07/12/21 10:00  07/14/21 10:33





  Aspirin Ec 81 Mg Tab  PO   81 mg





  QDAY SOTERO   Administration


 


Calcium Acetate  667 mg  07/13/21 12:00  07/14/21 17:28





  Calcium Acetate 667 Mg Cap  PO   667 mg





  TIDWM SOTERO   Administration


 


Carvedilol  12.5 mg  07/11/21 22:00  07/14/21 10:33





  Carvedilol 12.5 Mg Tab  PO   12.5 mg





  BID SOTERO   Administration


 


Dextrose  0 ml  07/11/21 02:17 





  Dextrose 50% In Water (25gm) 50 Ml Syringe  IV  





  Q30MIN PRN  





  Hypoglycemia  





  Protocol  


 


Furosemide  80 mg  07/14/21 10:00  07/14/21 10:33





  Furosemide 40 Mg Tab  PO   80 mg





  QDAY SOTERO   Administration


 


Hydralazine HCl  10 mg  07/11/21 06:13  07/11/21 10:10





  Hydralazine 20 Mg/1 Ml Inj  IV   10 mg





  Q4H PRN   Administration





  Blood Pressure  


 


Sodium Chloride  100 mls @ 999 mls/hr  07/12/21 10:00 





  Nacl 0.9%  IV  





  LIBIA PRN  





  Hypotension  


 


Insulin Human Regular  0 units  07/11/21 07:30  07/14/21 17:28





  Insulin Regular, Human 100 Units/1 Ml  SUB-Q   1 units





  ACHS SOTERO   Administration





  Protocol  


 


Isosorbide Mononitrate  30 mg  07/14/21 15:00  07/14/21 17:28





  Isosorbide Mononitrate Er 30 Mg Tab  PO   30 mg





  QDAY SOTERO   Administration


 


Magnesium Hydroxide  30 ml  07/11/21 02:17 





  Magnesium Hydroxide (Mom) Oral Liqd Udc  PO  





  Q4H PRN  





  Constipation  


 


Nitroglycerin  0.4 mg  07/13/21 06:00  07/14/21 06:04





  Nitroglycerin 0.4 Mg Patch 24hr  TD   0.4 mg





  QDAY@0600 SOTERO   Administration


 


Ondansetron HCl  4 mg  07/11/21 02:17  07/13/21 16:38





  Ondansetron 4 Mg/2 Ml Inj  IV   4 mg





  Q8H PRN   Administration





  Nausea And Vomiting  


 


Pantoprazole Sodium  40 mg  07/11/21 10:00  07/14/21 10:33





  Pantoprazole 40 Mg Inj  IV   40 mg





  BID SOTERO   Administration


 


Sodium Chloride  10 ml  07/11/21 10:00  07/14/21 10:33





  Sodium Chloride 0.9% 10 Ml Flush Syringe  IV   10 ml





  BID SOTERO   Administration


 


Sodium Chloride  10 ml  07/11/21 02:17 





  Sodium Chloride 0.9% 10 Ml Flush Syringe  IV  





  PRN PRN  





  LINE FLUSH  














Nutrition/Malnutrition Assess





- Dietary Evaluation


Nutrition/Malnutrition Findings: 


                                        





Nutrition Notes                                            Start:  07/11/21 

12:02


Freq:                                                      Status: Active       




Protocol:                                                                       




 Document     07/14/21 14:52    (Rec: 07/14/21 14:56    [email or web address])


 Nutrition Notes


     Initial or Follow up                        Reassessment


     Current Diagnosis                           Acute Kidney Injury,Diabetes,


                                                 Hypertension,Heart Failure,


                                                 Hyperlipidemia


     Other Pertinent Diagnosis                   elevated LFTs, GIB, anemia


     Current Diet                                Cardiac, Consistent CHO


     Labs/Tests                                  Reviewed


     Pertinent Medications                       Lasix


                                                 PhosLo


     Height                                      5 ft 5 in


     Weight                                      46.2 kg


     Ideal Body Weight (kg)                      61.81


     BMI                                         16.9


     Weight change and time frame                Wt change noted, will follow


                                                 trends


     Weight Status                               Underweight


     Subjective/Other Information                FU for intakes. Pt eating <50%


                                                 of meals. He does not think


                                                 he has lost any weight and is


                                                 unsure of UBW. Pt did not get


                                                 ONS due to new diet order. Due


                                                 to advanced age, pt not


                                                 appropriate for diet education


                                                 .


     Percent of energy/protein needs met:        71%/69%


     Burn                                        Absent


     Trauma                                      Absent


     GI Symptoms                                 None


     Current % PO                                Poor (25-49%)


     Minimum of two criteria                     Yes


     Energy Intake (non-severe)                  <75% Estimated Energy


                                                 Requirement >7 days


     Muscle Mass                                 Mild Depletion (non-severe)


     #1


      Nutrition Diagnosis                        Malnutrition


      Diagnosis Progress(for reassessment        Continues


       documentation)                            


     Is patient on ventilator?                   No


     Is Patient Ambulatory and/or Out of Bed     No


     [facility]      [phone]


     Calculation Used for Recommendations        [facility]


     Additional Notes                            Protein: (1.2-1.5g/kg) 62-77g


                                                 Fluid: 1 ml/kcal or per MD


 Nutrition Intervention


     Change Diet Order:                          Continue


     Add Supplement/Snack (indicate name/kcal    Nepro BID


      /protein )                                 


     Provides kCal:                              850


     Provides Protein (gm)                       38


     Goal #1                                     Meet at least 80% of protein


                                                 and energy needs via PO and


                                                 ONS intakes


     Goal #2                                     Weight gain/maintenance


     Anticipated Discharge Needs:                Cardiac, Consistent CHO


     Follow-Up By:                               07/16/21


     Additional Comments                         FU for intakes, ONS tolerance

## 2021-07-14 NOTE — PROGRESS NOTE
Hospitalist Physical





- Constitutional


Vitals: 


                                        











Temp Pulse Resp BP Pulse Ox


 


 97.9 F   61   18   94/56   97 


 


 07/14/21 15:45  07/14/21 15:45  07/14/21 15:45  07/14/21 15:45  07/14/21 15:45











General appearance: Present: no acute distress, cachectic, other (Patient is 

lethargic, frail and chronically ill-appearing)





Results





- Labs


CBC & Chem 7: 


                                 07/13/21 05:32





                                 07/13/21 05:32


Labs: 


                             Laboratory Last Values











WBC  7.3 K/mm3 (4.5-11.0)   07/13/21  05:32    


 


RBC  3.65 M/mm3 (3.65-5.03)   07/13/21  05:32    


 


Hgb  7.9 gm/dl (11.8-15.2)  L  07/13/21  05:32    


 


Hct  23.8 % (35.5-45.6)  L  07/13/21  05:32    


 


MCV  65 fl (84-94)  L  07/13/21  05:32    


 


MCH  22 pg (28-32)  L  07/13/21  05:32    


 


MCHC  33 % (32-34)   07/13/21  05:32    


 


RDW  17.6 % (13.2-15.2)  H  07/13/21  05:32    


 


Plt Count  94 K/mm3 (140-440)  L  07/13/21  05:32    


 


Add Manual Diff  Complete   07/12/21  04:25    


 


Total Counted  100   07/12/21  04:25    


 


Seg Neuts % (Manual)  87.0 % (40.0-70.0)  H  07/12/21  04:25    


 


Band Neutrophils %  1.0 %  07/12/21  04:25    


 


Lymphocytes % (Manual)  5.0 % (13.4-35.0)  L  07/12/21  04:25    


 


Monocytes % (Manual)  7.0 % (0.0-7.3)   07/12/21  04:25    


 


Nucleated RBC %  1.0 % (0.0-0.9)  H  07/12/21  04:25    


 


Seg Neutrophils # Man  7.0 K/mm3 (1.8-7.7)   07/12/21  04:25    


 


Band Neutrophils #  0.1 K/mm3  07/12/21  04:25    


 


Lymphocytes # (Manual)  0.4 K/mm3 (1.2-5.4)  L  07/12/21  04:25    


 


Abs React Lymphs (Man)  0.0 K/mm3  07/12/21  04:25    


 


Monocytes # (Manual)  0.6 K/mm3 (0.0-0.8)   07/12/21  04:25    


 


Eosinophils # (Manual)  0.0 K/mm3 (0.0-0.4)   07/12/21  04:25    


 


Basophils # (Manual)  0.0 K/mm3 (0.0-0.1)   07/12/21  04:25    


 


Metamyelocytes #  0.0 K/mm3  07/12/21  04:25    


 


Myelocytes #  0.0 K/mm3  07/12/21  04:25    


 


Promyelocytes #  0.0 K/mm3  07/12/21  04:25    


 


Blast Cells #  0.0 K/mm3  07/12/21  04:25    


 


WBC Morphology  Not Reportable   07/12/21  04:25    


 


Hypersegmented Neuts  Not Reportable   07/12/21  04:25    


 


Hyposegmented Neuts  Not Reportable   07/12/21  04:25    


 


Hypogranular Neuts  Not Reportable   07/12/21  04:25    


 


Smudge Cells  Not Reportable   07/12/21  04:25    


 


Toxic Granulation  Not Reportable   07/12/21  04:25    


 


Toxic Vacuolation  Not Reportable   07/12/21  04:25    


 


Dohle Bodies  Not Reportable   07/12/21  04:25    


 


Pelger-Huet Anomaly  Not Reportable   07/12/21  04:25    


 


Curt Rods  Not Reportable   07/12/21  04:25    


 


Platelet Estimate  Consistent w auto   07/12/21  04:25    


 


Clumped Platelets  Not Reportable   07/12/21  04:25    


 


Plt Clumps, EDTA  Not Reportable   07/12/21  04:25    


 


Large Platelets  Not Reportable   07/12/21  04:25    


 


Giant Platelets  Not Reportable   07/12/21  04:25    


 


Platelet Satelliting  Not Reportable   07/12/21  04:25    


 


Plt Morphology Comment  Not Reportable   07/12/21  04:25    


 


RBC Morphology  Not Reportable   07/12/21  04:25    


 


Dimorphic RBCs  Not Reportable   07/12/21  04:25    


 


Polychromasia  Few   07/12/21  04:25    


 


Hypochromasia  2+   07/12/21  04:25    


 


Poikilocytosis  Not Reportable   07/12/21  04:25    


 


Anisocytosis  2+   07/12/21  04:25    


 


Microcytosis  Not Reportable   07/12/21  04:25    


 


Macrocytosis  Not Reportable   07/12/21  04:25    


 


Spherocytes  Not Reportable   07/12/21  04:25    


 


Pappenheimer Bodies  Not Reportable   07/12/21  04:25    


 


Sickle Cells  Not Reportable   07/12/21  04:25    


 


Target Cells  Not Reportable   07/12/21  04:25    


 


Tear Drop Cells  Not Reportable   07/12/21  04:25    


 


Ovalocytes  Not Reportable   07/12/21  04:25    


 


Helmet Cells  Not Reportable   07/12/21  04:25    


 


Negrete-Kelly Bodies  Not Reportable   07/12/21  04:25    


 


Cabot Rings  Not Reportable   07/12/21  04:25    


 


Rough And Ready Cells  Not Reportable   07/12/21  04:25    


 


Bite Cells  Not Reportable   07/12/21  04:25    


 


Crenated Cell  Not Reportable   07/12/21  04:25    


 


Elliptocytes  Not Reportable   07/12/21  04:25    


 


Acanthocytes (Spur)  Not Reportable   07/12/21  04:25    


 


Rouleaux  Not Reportable   07/12/21  04:25    


 


Hemoglobin C Crystals  Not Reportable   07/12/21  04:25    


 


Schistocytes  1+   07/12/21  04:25    


 


Malaria parasites  Not Reportable   07/12/21  04:25    


 


Bryan Bodies  Not Reportable   07/12/21  04:25    


 


Hem Pathologist Commnt  No   07/12/21  04:25    


 


PT  18.4 Sec. (12.2-14.9)  H  07/13/21  19:20    


 


INR  1.48  (0.87-1.13)  H  07/13/21  19:20    


 


APTT  33.8 Sec. (24.2-36.6)   07/13/21  19:20    


 


ABG pH  7.384 pH Units (7.350-7.450)   07/11/21  14:40    


 


ABG pCO2  32.3 mm Hg  07/11/21  14:40    


 


ABG pO2  107.8 mm Hg (80.0-90.0)  H  07/11/21  14:40    


 


ABG HCO3  18.8 mmol/L (20.0-26.0)  L  07/11/21  14:40    


 


ABG O2 Saturation  97.9 % (95.0-99.0)   07/11/21  14:40    


 


ABG O2 Content  10.3  (0.0-44)   07/11/21  14:40    


 


ABG Base Excess  -5.6 mmol/L (-2.0-3.0)  L  07/11/21  14:40    


 


ABG Hemoglobin  7.4 gm/dl (14.0-18.0)  L  07/11/21  14:40    


 


ABG Carboxyhemoglobin  1.5 % (0.0-5.0)   07/11/21  14:40    


 


ABG Methemoglobin  0.5 % (0.0-1.5)   07/11/21  14:40    


 


Oxyhemoglobin  95.9 % (95.0-99.0)   07/11/21  14:40    


 


FiO2  28 %  07/11/21  14:40    


 


Sodium  140 mmol/L (137-145)   07/13/21  05:32    


 


Potassium  4.0 mmol/L (3.6-5.0)   07/13/21  05:32    


 


Chloride  100.9 mmol/L ()   07/13/21  05:32    


 


Carbon Dioxide  26 mmol/L (22-30)   07/13/21  05:32    


 


Anion Gap  17 mmol/L  07/13/21  05:32    


 


BUN  67 mg/dL (9-20)  H  07/13/21  05:32    


 


Creatinine  5.8 mg/dL (0.8-1.3)  H  07/13/21  05:32    


 


Estimated GFR  9 ml/min  07/13/21  05:32    


 


BUN/Creatinine Ratio  12 %  07/13/21  05:32    


 


Glucose  106 mg/dL ()  H  07/13/21  05:32    


 


POC Glucose  173 mg/dL ()  H  07/14/21  15:50    


 


Hemoglobin A1c  5.3 % (4-6)   07/11/21  02:00    


 


Calcium  8.3 mg/dL (8.4-10.2)  L  07/13/21  05:32    


 


Phosphorus  6.50 mg/dL (2.5-4.5)  H D 07/13/21  05:32    


 


Magnesium  2.30 mg/dL (1.7-2.3)   07/13/21  05:32    


 


Total Bilirubin  0.80 mg/dL (0.1-1.2)   07/13/21  05:32    


 


AST  207 units/L (5-40)  H  07/13/21  05:32    


 


ALT  402 units/L (7-56)  H  07/13/21  05:32    


 


Alkaline Phosphatase  89 units/L ()   07/13/21  05:32    


 


Total Protein  5.1 g/dL (6.3-8.2)  L  07/13/21  05:32    


 


Albumin  3.0 g/dL (3.9-5)  L  07/13/21  05:32    


 


Albumin/Globulin Ratio  1.4 %  07/13/21  05:32    


 


Lipase  51 units/L (13-60)   07/10/21  22:43    


 


PTH Intact  176.3 pg/mL (15-65)  H  07/12/21  04:25    


 


Urine Color  Yellow  (Yellow)   07/10/21  Unknown


 


Urine Turbidity  Cloudy  (Clear)   07/10/21  Unknown


 


Urine pH  5.0  (5.0-7.0)   07/10/21  Unknown


 


Ur Specific Gravity  1.018  (1.003-1.030)   07/10/21  Unknown


 


Urine Protein  >500 mg/dL (Negative)   07/10/21  Unknown


 


Urine Glucose (UA)  50 mg/dL (Negative)   07/10/21  Unknown


 


Urine Ketones  Neg mg/dL (Negative)   07/10/21  Unknown


 


Urine Blood  Mod  (Negative)   07/10/21  Unknown


 


Urine Nitrite  Neg  (Negative)   07/10/21  Unknown


 


Urine Bilirubin  Neg  (Negative)   07/10/21  Unknown


 


Urine Urobilinogen  < 2.0 mg/dL (<2.0)   07/10/21  Unknown


 


Ur Leukocyte Esterase  Neg  (Negative)   07/10/21  Unknown


 


Urine WBC (Auto)  4.0 /HPF (0.0-6.0)   07/10/21  Unknown


 


Urine RBC (Auto)  10.0 /HPF (0.0-6.0)   07/10/21  Unknown


 


U Epithel Cells (Auto)  1.0 /HPF (0-13.0)   07/10/21  Unknown


 


Urine Mucus  Few /HPF  07/10/21  Unknown


 


Urine Creatinine  78.5 mg/dL (0.1-20.0)  H  07/11/21  15:40    


 


Protein/Creatinin Ratio  9.49   07/11/21  15:40    


 


Urine Sodium  76 mmol/L  07/11/21  15:40    


 


Urine Total Protein  745 mg/dL (5-11.8)  H  07/11/21  15:40    


 


Hepatitis A IgM Ab  Non-reactive  (NonReactive)   07/11/21  00:00    


 


Hep Bs Antigen  Non-reactive  (Negative)   07/11/21  00:00    


 


Hep B Core IgM Ab  Non-reactive  (NonReactive)   07/11/21  00:00    


 


Hepatitis C Antibody  Non-reactive  (NonReactive)   07/11/21  00:00    


 


Blood Type  O POSITIVE   07/12/21  10:53    


 


Antibody Screen  Negative   07/12/21  10:53    


 


Crossmatch  See Detail   07/12/21  10:53    











Malave/IV: 


                                        





Voiding Method                   Urinal











Active Medications





- Current Medications


Current Medications: 














Generic Name Dose Route Start Last Admin





  Trade Name Freq  PRN Reason Stop Dose Admin


 


Acetaminophen  650 mg  07/11/21 02:17 





  Acetaminophen 325 Mg Tab  PO  





  Q6H PRN  





  Pain MILD(1-3)/Fever >100.5/HA  


 


Amlodipine Besylate  10 mg  07/12/21 13:00  07/14/21 10:33





  Amlodipine 10 Mg Tab  PO   10 mg





  QDAY SOTERO   Administration


 


Aspirin  81 mg  07/12/21 10:00  07/14/21 10:33





  Aspirin Ec 81 Mg Tab  PO   81 mg





  QDAY SOTERO   Administration


 


Calcium Acetate  667 mg  07/13/21 12:00  07/14/21 17:28





  Calcium Acetate 667 Mg Cap  PO   667 mg





  TIDWM SOTERO   Administration


 


Carvedilol  12.5 mg  07/11/21 22:00  07/14/21 10:33





  Carvedilol 12.5 Mg Tab  PO   12.5 mg





  BID SOTERO   Administration


 


Dextrose  0 ml  07/11/21 02:17 





  Dextrose 50% In Water (25gm) 50 Ml Syringe  IV  





  Q30MIN PRN  





  Hypoglycemia  





  Protocol  


 


Furosemide  80 mg  07/14/21 10:00  07/14/21 10:33





  Furosemide 40 Mg Tab  PO   80 mg





  QDAY SOTERO   Administration


 


Hydralazine HCl  10 mg  07/11/21 06:13  07/11/21 10:10





  Hydralazine 20 Mg/1 Ml Inj  IV   10 mg





  Q4H PRN   Administration





  Blood Pressure  


 


Sodium Chloride  100 mls @ 999 mls/hr  07/12/21 10:00 





  Nacl 0.9%  IV  





  LIBIA PRN  





  Hypotension  


 


Insulin Human Regular  0 units  07/11/21 07:30  07/14/21 17:28





  Insulin Regular, Human 100 Units/1 Ml  SUB-Q   1 units





  ACHS SOTERO   Administration





  Protocol  


 


Isosorbide Mononitrate  30 mg  07/14/21 15:00  07/14/21 17:28





  Isosorbide Mononitrate Er 30 Mg Tab  PO   30 mg





  QDAY SOTERO   Administration


 


Magnesium Hydroxide  30 ml  07/11/21 02:17 





  Magnesium Hydroxide (Mom) Oral Liqd Udc  PO  





  Q4H PRN  





  Constipation  


 


Nitroglycerin  0.4 mg  07/13/21 06:00  07/14/21 06:04





  Nitroglycerin 0.4 Mg Patch 24hr  TD   0.4 mg





  QDAY@0600 SOTERO   Administration


 


Ondansetron HCl  4 mg  07/11/21 02:17  07/13/21 16:38





  Ondansetron 4 Mg/2 Ml Inj  IV   4 mg





  Q8H PRN   Administration





  Nausea And Vomiting  


 


Pantoprazole Sodium  40 mg  07/11/21 10:00  07/14/21 10:33





  Pantoprazole 40 Mg Inj  IV   40 mg





  BID SOTERO   Administration


 


Sodium Chloride  10 ml  07/11/21 10:00  07/14/21 10:33





  Sodium Chloride 0.9% 10 Ml Flush Syringe  IV   10 ml





  BID SOTERO   Administration


 


Sodium Chloride  10 ml  07/11/21 02:17 





  Sodium Chloride 0.9% 10 Ml Flush Syringe  IV  





  PRN PRN  





  LINE FLUSH  














Nutrition/Malnutrition Assess





- Dietary Evaluation


Nutrition/Malnutrition Findings: 


                                        





Nutrition Notes                                            Start:  07/11/21 

12:02


Freq:                                                      Status: Active       




Protocol:                                                                       




 Document     07/14/21 14:52    (Rec: 07/14/21 14:56    [id number])


 Nutrition Notes


     Initial or Follow up                        Reassessment


     Current Diagnosis                           Acute Kidney Injury,Diabetes,


                                                 Hypertension,Heart Failure,


                                                 Hyperlipidemia


     Other Pertinent Diagnosis                   elevated LFTs, GIB, anemia


     Current Diet                                Cardiac, Consistent CHO


     Labs/Tests                                  Reviewed


     Pertinent Medications                       Lasix


                                                 PhosLo


     Height                                      5 ft 5 in


     Weight                                      46.2 kg


     Ideal Body Weight (kg)                      61.81


     BMI                                         16.9


     Weight change and time frame                Wt change noted, will follow


                                                 trends


     Weight Status                               Underweight


     Subjective/Other Information                FU for intakes. Pt eating <50%


                                                 of meals. He does not think


                                                 he has lost any weight and is


                                                 unsure of UBW. Pt did not get


                                                 ONS due to new diet order. Due


                                                 to advanced age, pt not


                                                 appropriate for diet education


                                                 .


     Percent of energy/protein needs met:        71%/69%


     Burn                                        Absent


     Trauma                                      Absent


     GI Symptoms                                 None


     Current % PO                                Poor (25-49%)


     Minimum of two criteria                     Yes


     Energy Intake (non-severe)                  <75% Estimated Energy


                                                 Requirement >7 days


     Muscle Mass                                 Mild Depletion (non-severe)


     #1


      Nutrition Diagnosis                        Malnutrition


      Diagnosis Progress(for reassessment        Continues


       documentation)                            


     Is patient on ventilator?                   No


     Is Patient Ambulatory and/or Out of Bed     No


     REE-([facility]-confined to bed)      [phone]


     Calculation Used for Recommendations        [facility]


     Additional Notes                            Protein: (1.2-1.5g/kg) 62-77g


                                                 Fluid: 1 ml/kcal or per MD


 Nutrition Intervention


     Change Diet Order:                          Continue


     Add Supplement/Snack (indicate name/kcal    Nepro BID


      /protein )                                 


     Provides kCal:                              850


     Provides Protein (gm)                       38


     Goal #1                                     Meet at least 80% of protein


                                                 and energy needs via PO and


                                                 ONS intakes


     Goal #2                                     Weight gain/maintenance


     Anticipated Discharge Needs:                Cardiac, Consistent CHO


     Follow-Up By:                               07/16/21


     Additional Comments                         FU for intakes, ONS tolerance

## 2021-07-14 NOTE — PROGRESS NOTE
Assessment and Plan





Abnormal ECG


   EKG shows a sinus rhythm with frequent PACs. There is left ventricular 

hypertrophy.  There


   are deep T wave inversions in the anterior precordial leads. The 

repolarization abnormalities 


   may be due to LVH, or represent acute or ongoing ischemia. There are no old 

ECGs available 


   for comparison.


Severe anemia with thrombocytopenia


Elevated liver enzymes


Renal failure


   initiated on hemodialysis 


Possible prior coronary artery bypass


   Left ventricular ejection fraction is 45-50% by echo this presentation.





Recommendations:


Continue medical therapy for underlying coronary artery disease. Statin therapy 

held due to abnormal liver enzymes.





Subjective


Date of service: 07/14/21


Principal diagnosis: Anemia, Abnormal LFTs


Interval history: 





The patient speaks very little English. 


He is resting in bed and appears comfortable. No cardiac events reported 

overnight.





Objective


                                   Vital Signs











  Temp Pulse Resp BP Pulse Ox


 


 07/14/21 08:09      94


 


 07/14/21 08:06  97.5 F L  68  18  132/75  98


 


 07/14/21 06:04   72   107/59  97


 


 07/14/21 06:00    18  


 


 07/14/21 05:39  97.9 F  70   107/59  90


 


 07/13/21 23:30  97.8 F  68  18  90/52  92


 


 07/13/21 22:00   66  20   98


 


 07/13/21 21:54   66   95/59 


 


 07/13/21 19:18  97.5 F L  64  18  95/59  94


 


 07/13/21 12:05  98.3 F  73  18  133/70 


 


 07/13/21 11:55   66   123/76 


 


 07/13/21 11:45   70   133/70 


 


 07/13/21 11:30   44 L   116/57 


 


 07/13/21 11:15   51 L   139/61 


 


 07/13/21 11:00   42 L   118/62 


 


 07/13/21 10:45   48 L   128/61 


 


 07/13/21 10:30   49 L   125/56 


 


 07/13/21 10:15   47 L   128/66 


 


 07/13/21 10:00   47 L   124/59 


 


 07/13/21 09:45   43 L   110/70 














- Physical Examination


General: No Apparent Distress


HEENT: Positive: PERRL


Neck: Positive: neck supple


Cardiac: Positive: Reg Rate and Rhythm


Neuro: Positive: Weakness (Generalized weakness, frail and chronically ill 

appearing)


Extremities: Absent: normal





- Labs and Meds


                                   Coagulation











  07/13/21 Range/Units





  19:20 


 


PT  18.4 H  (12.2-14.9)  Sec.


 


INR  1.48 H  (0.87-1.13)  


 


APTT  33.8  (24.2-36.6)  Sec.














- Allied health notes


Allied health notes reviewed: nursing

## 2021-07-14 NOTE — PROGRESS NOTE
Assessment and Plan


1. Acute kidney injury superimposed on CKD:


EDE superimposed on CKD .


CT abdomen negative for hydro.


Urine studies ordered.


Monitor renal function.


Renal prognosis is guarded to poor.


Avoid nephrotoxic agents.


Meds dosage based on GFR.


Patient was started on hemodialysis due to significant decline in the GFR, 

associated hyperkalemia, metabolic acidosis and volume overload.


Hemodialysis: 7/12, 7/13.





2. FEN:


Hyperkalemia, improved, monitor.


Anion-gap Metabolic acidosis, HD today, monitor.


Volume overload, UF with HD, resume IV lasix.


Monitor lytes.





3. Heavy proteinuria:


?etiology.


STEVE, ANCA, GBm Ab, Complements, UPEP and SPEP ordered.





4. Decompensated CHF:


Echo EF 45-50%.


Strict I/O.


On Carvedilol.


Followed by Cards.





5. Elevated liver enzymes:


Hepatitis serologies negative.





6. GI bleed:


Per GI EGD in the next few days, after CHF/HTN/EDE has been assessed and 

improved.





7. Hypertensive urgency:


BP controlled.


UF with HD as tolerated.


Adjust meds as needed.


Monitor.





8. Hypochromic Anemia, POA:


?2/2 GI bleed.


Followed by GI.


Epogen.


Monitor.





9. Thrombocytopenia.





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











Subjective:


Patient was seen and examined at the bedside while on HD.











Examination:


General appearance: well-developed, thin built, appears stated age, not in 

distress


HEENT: ATNC, pupils equal


Neck: supple


Respiratory: diminished breath sounds bibasal


Cardiology: regular, S1S2, no murmur


Gastrointestinal: soft, bowel sounds heard, not tender


Integumentary: warm and dry, upper chest hypopigmented patches noted


Neurologic: alert, moving extremities


Ext: no edema


Hemodialysis access: R IJ temp catheter, L IJ Tunnel catheter








Subjective


Date of service: 07/14/21


Principal diagnosis: Anemia, Abnormal LFTs





Objective





- Vital Signs


Vital signs: 


                               Vital Signs - 12hr











  07/14/21 07/14/21 07/14/21





  05:39 06:00 06:04


 


Temperature 97.9 F  


 


Pulse Rate 70  72


 


Respiratory  18 





Rate   


 


Blood Pressure 107/59  107/59


 


O2 Sat by Pulse 90  97





Oximetry   














  07/14/21 07/14/21





  08:06 08:09


 


Temperature 97.5 F L 


 


Pulse Rate 68 


 


Respiratory 18 





Rate  


 


Blood Pressure 132/75 


 


O2 Sat by Pulse 98 94





Oximetry  














- Lab





                                 07/15/21 04:31





                                 07/15/21 04:31


                             Most recent lab results











ABG pH  7.384 pH Units (7.350-7.450)   07/11/21  14:40    


 


ABG pCO2  32.3 mm Hg  07/11/21  14:40    


 


ABG pO2  107.8 mm Hg (80.0-90.0)  H  07/11/21  14:40    


 


ABG HCO3  18.8 mmol/L (20.0-26.0)  L  07/11/21  14:40    


 


ABG O2 Saturation  97.9 % (95.0-99.0)   07/11/21  14:40    


 


Calcium  8.3 mg/dL (8.4-10.2)  L  07/13/21  05:32    


 


Phosphorus  6.50 mg/dL (2.5-4.5)  H D 07/13/21  05:32    


 


Magnesium  2.30 mg/dL (1.7-2.3)   07/13/21  05:32    


 


Urine Creatinine  78.5 mg/dL (0.1-20.0)  H  07/11/21  15:40    


 


Urine Sodium  76 mmol/L  07/11/21  15:40    


 


Urine Total Protein  745 mg/dL (5-11.8)  H  07/11/21  15:40    














Medications & Allergies





- Medications


Allergies/Adverse Reactions: 


                                    Allergies





No Known Allergies Allergy (Unverified 01/17/15 01:13)


   








Home Medications: 


                                Home Medications











 Medication  Instructions  Recorded  Confirmed  Last Taken  Type


 


Amlodipine Besylate [Norvasc] 10 mg PO QDAY 07/10/21 07/10/21 Unknown History


 


Aspirin EC [Halfprin EC] 81 mg PO QDAY 07/10/21 07/10/21 Unknown History


 


Atorvastatin Calcium [Lipitor] 80 mg PO QDAY 07/10/21 07/10/21 Unknown History


 


Lisinopril [Zestril] 10 mg PO QDAY 07/10/21 07/10/21 Unknown History











Active Medications: 














Generic Name Dose Route Start Last Admin





  Trade Name Freq  PRN Reason Stop Dose Admin


 


Acetaminophen  650 mg  07/11/21 02:17 





  Acetaminophen 325 Mg Tab  PO  





  Q6H PRN  





  Pain MILD(1-3)/Fever >100.5/HA  


 


Amlodipine Besylate  10 mg  07/12/21 13:00  07/14/21 10:33





  Amlodipine 10 Mg Tab  PO   10 mg





  QDAY SOTERO   Administration


 


Aspirin  81 mg  07/12/21 10:00  07/14/21 10:33





  Aspirin Ec 81 Mg Tab  PO   81 mg





  QDAY SOTERO   Administration


 


Calcium Acetate  667 mg  07/13/21 12:00  07/14/21 10:33





  Calcium Acetate 667 Mg Cap  PO   667 mg





  TIDWM SOTERO   Administration


 


Carvedilol  12.5 mg  07/11/21 22:00  07/14/21 10:33





  Carvedilol 12.5 Mg Tab  PO   12.5 mg





  BID SOTERO   Administration


 


Dextrose  0 ml  07/11/21 02:17 





  Dextrose 50% In Water (25gm) 50 Ml Syringe  IV  





  Q30MIN PRN  





  Hypoglycemia  





  Protocol  


 


Furosemide  80 mg  07/14/21 10:00  07/14/21 10:33





  Furosemide 40 Mg Tab  PO   80 mg





  QDAY [facility]   Administration


 


Hydralazine HCl  10 mg  07/11/21 06:13  07/11/21 10:10





  Hydralazine 20 Mg/1 Ml Inj  IV   10 mg





  Q4H PRN   Administration





  Blood Pressure  


 


Sodium Chloride  100 mls @ 999 mls/hr  07/12/21 10:00 





  Nacl 0.9%  IV  





  LIBIA PRN  





  Hypotension  


 


Insulin Human Regular  0 units  07/11/21 07:30  07/14/21 10:33





  Insulin Regular, Human 100 Units/1 Ml  SUB-Q   1 units





  [facility]   Administration





  Protocol  


 


Magnesium Hydroxide  30 ml  07/11/21 02:17 





  Magnesium Hydroxide (Mom) Oral Liqd Udc  PO  





  Q4H PRN  





  Constipation  


 


Nitroglycerin  0.4 mg  07/13/21 06:00  07/14/21 06:04





  Nitroglycerin 0.4 Mg Patch 24hr  TD   0.4 mg





  QDAY@0600 [facility]   Administration


 


Ondansetron HCl  4 mg  07/11/21 02:17  07/13/21 16:38





  Ondansetron 4 Mg/2 Ml Inj  IV   4 mg





  Q8H PRN   Administration





  Nausea And Vomiting  


 


Pantoprazole Sodium  40 mg  07/11/21 10:00  07/14/21 10:33





  Pantoprazole 40 Mg Inj  IV   40 mg





  BID [facility]   Administration


 


Sodium Chloride  10 ml  07/11/21 10:00  07/14/21 10:33





  Sodium Chloride 0.9% 10 Ml Flush Syringe  IV   10 ml





  BID [facility]   Administration


 


Sodium Chloride  10 ml  07/11/21 02:17 





  Sodium Chloride 0.9% 10 Ml Flush Syringe  IV  





  PRN PRN  





  LINE FLUSH

## 2021-07-14 NOTE — GASTROENTEROLOGY PROGRESS NOTE
Assessment and Plan





- Patient Problems


(1) CHF (congestive heart failure), NYHA class III


Current Visit: Yes   Status: Acute   


Plan to address problem: 


- Cards eval/TTE results noted.  Moderate disease, and hopefully will improve 

after fluid removal with HD.








(2) Acute kidney injury superimposed on chronic kidney disease


Current Visit: Yes   Status: Acute   





(3) Elevated liver enzymes


Current Visit: Yes   Status: Acute   


Plan to address problem: 


- Hepatitis serologies negative.


- No gross mass on CT or US, but this would be highest on differential.  With 

EDE, and (?) new atorvastatin, would consider statin liver injury as well.


- Would consider non-contrast MRI when more stable (but LFTs trending down after

d/c atorvastatin).








(4) GI bleed


Current Visit: Yes   Status: Acute   


Plan to address problem: 


- Continue current protonix.


- Will need EGD (and possibly colonoscopy); defer until cleared by Cardiology.


- OK to continue cardiac ASA, but would avoid other blood thinners at present.








(5) Hypertensive urgency


Current Visit: Yes   Status: Acute   





Subjective


Date of service: 07/14/21


Principal diagnosis: Anemia, Abnormal LFTs


Interval history: 





The patient ate most of his breakfast without N/V, and denies abdominal pain.  

No gross bleeding noted by nursing.





Objective





- Constitutional


Vitals: 


                                        











Temp Pulse Resp BP Pulse Ox


 


 97.5 F L  68   18   132/75   94 


 


 07/14/21 08:06  07/14/21 08:06  07/14/21 08:06  07/14/21 08:06  07/14/21 08:09











General appearance: no acute distress





- Respiratory


Respiratory effort: normal


Respiratory: bilateral: CTA





- Cardiovascular


Rhythm: regular


Heart Sounds: Present: S1 & S2





- Gastrointestinal


General gastrointestinal: Present: soft, non-tender, non-distended





- Labs


CBC & Chem 7: 


                                 07/13/21 05:32





                                 07/13/21 05:32


Labs: 


                         Laboratory Results - last 24 hr











  07/13/21 07/13/21 07/13/21





  15:10 19:20 20:55


 


PT   18.4 H 


 


INR   1.48 H 


 


APTT   33.8 


 


POC Glucose  137 H   195 H














  07/14/21





  08:57


 


PT 


 


INR 


 


APTT 


 


POC Glucose  164 H

## 2021-07-14 NOTE — ELECTROCARDIOGRAPH REPORT
[facility]

                                       

Test Date:    2021               Test Time:    12:41:02

Pat Name:     LORETTA MOORE              Department:   

Patient ID:   [id number]          Room:         A467 1

Gender:       M                        Technician:   [email or web address]

:          1940               Requested By: WILMER VENTURA

Order Number: [id number]              Reading MD:   Toño Stafford

                                 Measurements

Intervals                              Axis          

Rate:         75                       P:            

ND:                                    QRS:          37

QRSD:         98                       T:            139

QT:           556                                    

QTc:          622                                    

                           Interpretive Statements

S.R with ?aberrantly conducted APC's.

T inversions in anterior leads,consider ischemia.Atrial pacing noted 

intermittently.

LVH w/ repol abnormalities, possible ischemia

Prolonged QT interval

No previous ECG available for comparison

Electronically Signed On 2021 9:48:07 EDT by Toño Stafford

## 2021-07-14 NOTE — PROGRESS NOTE
Assessment and Plan





Patient and very weak. Resting on  3 litres O2. O2 saturation 955 Patient 

admitted with nausea vomiting and abdominal pain. Abdominal pain is better now. 

No complaint of chest pain,shortness of breath or cough. Patient has history of 

Hypertension, asthma and diabetes and Aortic dissection. Patient has history of 

smoking 1 pack x 40years. Says stopped smoking approximately 30 years ago. 

Denies alcohol or drug abuse.Patient worked as teacher,  and worked in 

wear house. Patient wolynn. Has four childre. No known drug allergies.


Patient afebrile. No leukocytosis.





- Patient Problems


(1) Acute kidney injury superimposed on chronic kidney disease


Current Visit: Yes   Status: Acute   


Plan to address problem: 


Management as per nephrology.








(2) CHF (congestive heart failure), NYHA class III


Current Visit: Yes   Status: Acute   


Plan to address problem: 


Management as per Cardioogy.








(3) GI bleed


Current Visit: Yes   Status: Acute   


Plan to address problem: 


Management as per gastroenterology.








(4) Hypertensive urgency


Current Visit: Yes   Status: Acute   


Plan to address problem: 


Management as per primary care.








(5) Pulmonary edema


Current Visit: Yes   Status: Acute   


Plan to address problem: 


Patient is on lasix and dialysis.


Repeating chest xray








Subjective


Date of service: 07/14/21


Principal diagnosis: Anemia, Abnormal LFTs


Interval history: 





Patient and very weak. Resting on  3 litres O2. O2 saturation 955 Patient 

admitted with nausea vomiting and abdominal pain. Abdominal pain is better now. 

No complaint of chest pain,shortness of breath or cough. Patient has history of 

Hypertension, asthma and diabetes and Aortic dissection. Patient has history of 

smoking 1 pack x 40years. Says stopped smoking approximately 30 years ago. 

Denies alcohol or drug abuse.Patient worked as teacher,  and worked in 

wear house. Patient wolynn. Has four childre. No known drug allergies.


Patient afebrile. No leukocytosis.





Objective


                               Vital Signs - 12hr











  07/14/21 07/14/21 07/14/21





  05:39 06:00 06:04


 


Temperature 97.9 F  


 


Pulse Rate 70  72


 


Respiratory  18 





Rate   


 


Blood Pressure 107/59  107/59


 


O2 Sat by Pulse 90  97





Oximetry   














  07/14/21 07/14/21 07/14/21





  08:06 08:09 14:00


 


Temperature 97.5 F L  


 


Pulse Rate 68  67


 


Respiratory 18  





Rate   


 


Blood Pressure 132/75  


 


O2 Sat by Pulse 98 94 





Oximetry   














  07/14/21





  15:45


 


Temperature 97.9 F


 


Pulse Rate 61


 


Respiratory 18





Rate 


 


Blood Pressure 94/56


 


O2 Sat by Pulse 97





Oximetry 











Constitutional: no acute distress, alert, other (Weak.)


Eyes: non-icteric


ENT: oropharynx moist


Neck: supple, no lymphadenopathy


Ascultation: Bilateral: diminished breath sounds


Cardiovascular: regular rate and rhythm


Gastrointestinal: normoactive bowel sounds, soft, non-tender


Integumentary: normal


Extremities: no cyanosis, no edema


Neurologic: non-focal exam, pupils equal and round


Psychiatric: depressed


CBC and BMP: 


                                 07/13/21 05:32





                                 07/13/21 05:32


ABG, PT/INR, D-dimer: 


ABG











ABG pH  7.384 pH Units (7.350-7.450)   07/11/21  14:40    


 


ABG pCO2  32.3 mm Hg  07/11/21  14:40    


 


ABG pO2  107.8 mm Hg (80.0-90.0)  H  07/11/21  14:40    


 


ABG O2 Saturation  97.9 % (95.0-99.0)   07/11/21  14:40    





PT/INR, D-dimer











PT  18.4 Sec. (12.2-14.9)  H  07/13/21  19:20    


 


INR  1.48  (0.87-1.13)  H  07/13/21  19:20    








Abnormal lab findings: 


                                  Abnormal Labs











  07/10/21 07/10/21 07/11/21





  22:43 22:43 04:28


 


RBC   


 


Hgb  8.5 L  


 


Hct  26.1 L  


 


MCV  65 L  


 


MCH  21 L  


 


RDW  18.4 H  


 


Plt Count   


 


Seg Neuts % (Manual)  80.0 H  


 


Lymphocytes % (Manual)  13.0 L  


 


Nucleated RBC %   


 


Lymphocytes # (Manual)  0.9 L  


 


PT   


 


INR   


 


ABG pO2   


 


ABG HCO3   


 


ABG Base Excess   


 


ABG Hemoglobin   


 


Potassium   5.9 H  5.2 H


 


Carbon Dioxide   14 L  20 L


 


BUN   72 H  72 H


 


Creatinine   6.8 H  6.5 H


 


Glucose   152 H 


 


POC Glucose   


 


Calcium   


 


Phosphorus   


 


Total Bilirubin   1.50 H  1.40 H


 


AST   517 H  604 H


 


ALT   448 H  491 H


 


Total Protein    6.2 L


 


Albumin   3.8 L  3.8 L


 


PTH Intact   


 


Urine Creatinine   


 


Urine Total Protein   


 


Crossmatch   














  07/11/21 07/11/21 07/11/21





  04:32 08:23 08:23


 


RBC   


 


Hgb   7.8 L 


 


Hct   24.9 L 


 


MCV   66 L 


 


MCH   21 L 


 


RDW   18.0 H 


 


Plt Count   98 L 


 


Seg Neuts % (Manual)   


 


Lymphocytes % (Manual)   1.0 L 


 


Nucleated RBC %   


 


Lymphocytes # (Manual)   0.1 L 


 


PT   


 


INR   


 


ABG pO2   


 


ABG HCO3   


 


ABG Base Excess   


 


ABG Hemoglobin   


 


Potassium    5.7 H


 


Carbon Dioxide    14 L


 


BUN    77 H


 


Creatinine    6.7 H


 


Glucose    126 H


 


POC Glucose  114 H  


 


Calcium   


 


Phosphorus   


 


Total Bilirubin   


 


AST   


 


ALT   


 


Total Protein   


 


Albumin   


 


PTH Intact   


 


Urine Creatinine   


 


Urine Total Protein   


 


Crossmatch   














  07/11/21 07/11/21 07/11/21





  08:58 11:33 14:40


 


RBC   


 


Hgb   


 


Hct   


 


MCV   


 


MCH   


 


RDW   


 


Plt Count   


 


Seg Neuts % (Manual)   


 


Lymphocytes % (Manual)   


 


Nucleated RBC %   


 


Lymphocytes # (Manual)   


 


PT   


 


INR   


 


ABG pO2    107.8 H


 


ABG HCO3    18.8 L


 


ABG Base Excess    -5.6 L


 


ABG Hemoglobin    7.4 L


 


Potassium   


 


Carbon Dioxide   


 


BUN   


 


Creatinine   


 


Glucose   


 


POC Glucose  139 H  155 H 


 


Calcium   


 


Phosphorus   


 


Total Bilirubin   


 


AST   


 


ALT   


 


Total Protein   


 


Albumin   


 


PTH Intact   


 


Urine Creatinine   


 


Urine Total Protein   


 


Crossmatch   














  07/11/21 07/11/21 07/11/21





  15:40 16:46 21:29


 


RBC   


 


Hgb   


 


Hct   


 


MCV   


 


MCH   


 


RDW   


 


Plt Count   


 


Seg Neuts % (Manual)   


 


Lymphocytes % (Manual)   


 


Nucleated RBC %   


 


Lymphocytes # (Manual)   


 


PT   


 


INR   


 


ABG pO2   


 


ABG HCO3   


 


ABG Base Excess   


 


ABG Hemoglobin   


 


Potassium   


 


Carbon Dioxide   


 


BUN   


 


Creatinine   


 


Glucose   


 


POC Glucose   158 H  138 H


 


Calcium   


 


Phosphorus   


 


Total Bilirubin   


 


AST   


 


ALT   


 


Total Protein   


 


Albumin   


 


PTH Intact   


 


Urine Creatinine  78.5 H  


 


Urine Total Protein  745 H  


 


Crossmatch   














  07/12/21 07/12/21 07/12/21





  04:25 04:25 04:25


 


RBC  3.50 L  


 


Hgb  7.3 L  


 


Hct  22.8 L  


 


MCV  65 L  


 


MCH  21 L  


 


RDW  17.9 H  


 


Plt Count  88 L  


 


Seg Neuts % (Manual)  87.0 H  


 


Lymphocytes % (Manual)  5.0 L  


 


Nucleated RBC %  1.0 H  


 


Lymphocytes # (Manual)  0.4 L  


 


PT   


 


INR   


 


ABG pO2   


 


ABG HCO3   


 


ABG Base Excess   


 


ABG Hemoglobin   


 


Potassium   


 


Carbon Dioxide   20 L 


 


BUN   89 H 


 


Creatinine   7.6 H 


 


Glucose   129 H 


 


POC Glucose   


 


Calcium   


 


Phosphorus   8.20 H 


 


Total Bilirubin   


 


AST   502 H 


 


ALT   636 H 


 


Total Protein   5.7 L 


 


Albumin   3.2 L 


 


PTH Intact    176.3 H


 


Urine Creatinine   


 


Urine Total Protein   


 


Crossmatch   














  07/12/21 07/12/21 07/12/21





  07:21 10:53 11:11


 


RBC   


 


Hgb   


 


Hct   


 


MCV   


 


MCH   


 


RDW   


 


Plt Count   


 


Seg Neuts % (Manual)   


 


Lymphocytes % (Manual)   


 


Nucleated RBC %   


 


Lymphocytes # (Manual)   


 


PT   


 


INR   


 


ABG pO2   


 


ABG HCO3   


 


ABG Base Excess   


 


ABG Hemoglobin   


 


Potassium   


 


Carbon Dioxide   


 


BUN   


 


Creatinine   


 


Glucose   


 


POC Glucose  120 H   120 H


 


Calcium   


 


Phosphorus   


 


Total Bilirubin   


 


AST   


 


ALT   


 


Total Protein   


 


Albumin   


 


PTH Intact   


 


Urine Creatinine   


 


Urine Total Protein   


 


Crossmatch   See Detail 














  07/12/21 07/12/21 07/13/21





  16:28 21:07 05:32


 


RBC   


 


Hgb    7.9 L


 


Hct    23.8 L


 


MCV    65 L


 


MCH    22 L


 


RDW    17.6 H


 


Plt Count    94 L


 


Seg Neuts % (Manual)   


 


Lymphocytes % (Manual)   


 


Nucleated RBC %   


 


Lymphocytes # (Manual)   


 


PT   


 


INR   


 


ABG pO2   


 


ABG HCO3   


 


ABG Base Excess   


 


ABG Hemoglobin   


 


Potassium   


 


Carbon Dioxide   


 


BUN   


 


Creatinine   


 


Glucose   


 


POC Glucose  146 H  167 H 


 


Calcium   


 


Phosphorus   


 


Total Bilirubin   


 


AST   


 


ALT   


 


Total Protein   


 


Albumin   


 


PTH Intact   


 


Urine Creatinine   


 


Urine Total Protein   


 


Crossmatch   














  07/13/21 07/13/21 07/13/21





  05:32 15:10 19:20


 


RBC   


 


Hgb   


 


Hct   


 


MCV   


 


MCH   


 


RDW   


 


Plt Count   


 


Seg Neuts % (Manual)   


 


Lymphocytes % (Manual)   


 


Nucleated RBC %   


 


Lymphocytes # (Manual)   


 


PT    18.4 H


 


INR    1.48 H


 


ABG pO2   


 


ABG HCO3   


 


ABG Base Excess   


 


ABG Hemoglobin   


 


Potassium   


 


Carbon Dioxide   


 


BUN  67 H  


 


Creatinine  5.8 H  


 


Glucose  106 H  


 


POC Glucose   137 H 


 


Calcium  8.3 L  


 


Phosphorus  6.50 H D  


 


Total Bilirubin   


 


AST  207 H  


 


ALT  402 H  


 


Total Protein  5.1 L  


 


Albumin  3.0 L  


 


PTH Intact   


 


Urine Creatinine   


 


Urine Total Protein   


 


Crossmatch   














  07/13/21 07/14/21 07/14/21





  20:55 08:57 11:55


 


RBC   


 


Hgb   


 


Hct   


 


MCV   


 


MCH   


 


RDW   


 


Plt Count   


 


Seg Neuts % (Manual)   


 


Lymphocytes % (Manual)   


 


Nucleated RBC %   


 


Lymphocytes # (Manual)   


 


PT   


 


INR   


 


ABG pO2   


 


ABG HCO3   


 


ABG Base Excess   


 


ABG Hemoglobin   


 


Potassium   


 


Carbon Dioxide   


 


BUN   


 


Creatinine   


 


Glucose   


 


POC Glucose  195 H  164 H  189 H


 


Calcium   


 


Phosphorus   


 


Total Bilirubin   


 


AST   


 


ALT   


 


Total Protein   


 


Albumin   


 


PTH Intact   


 


Urine Creatinine   


 


Urine Total Protein   


 


Crossmatch   














  07/14/21





  15:50


 


RBC 


 


Hgb 


 


Hct 


 


MCV 


 


MCH 


 


RDW 


 


Plt Count 


 


Seg Neuts % (Manual) 


 


Lymphocytes % (Manual) 


 


Nucleated RBC % 


 


Lymphocytes # (Manual) 


 


PT 


 


INR 


 


ABG pO2 


 


ABG HCO3 


 


ABG Base Excess 


 


ABG Hemoglobin 


 


Potassium 


 


Carbon Dioxide 


 


BUN 


 


Creatinine 


 


Glucose 


 


POC Glucose  173 H


 


Calcium 


 


Phosphorus 


 


Total Bilirubin 


 


AST 


 


ALT 


 


Total Protein 


 


Albumin 


 


PTH Intact 


 


Urine Creatinine 


 


Urine Total Protein 


 


Crossmatch 











Chest x-ray: report reviewed, image reviewed


CT scan - chest: report reviewed, image reviewed


Additional Studies: 





CHEST 1 VIEW 7/12/2021 8:33 AM  


 


 INDICATION / CLINICAL INFORMATION: right ij vas cath placement.  


 


 COMPARISON: 7/11/2021  


 


 FINDINGS:  


 


 SUPPORT DEVICES: Right IJ central venous catheter has been placed with the tip 

projecting over the 


superior cavoatrial junction.  


 


 HEART / MEDIASTINUM: Stable.   


 


 LUNGS / PLEURA: Stable small bilateral pleural effusions and patchy bilateral 

pulmonary opacities. 


No pneumothorax.   


 


 ADDITIONAL FINDINGS: No significant additional findings.  


 


 IMPRESSION:  


 1. Right IJ central venous catheter appears appropriately positioned.  


 


CT CHEST WITHOUT CONTRAST  


 


 INDICATION / CLINICAL INFORMATION:  


 right vascath bleeding, course of catheter.  


 


 TECHNIQUE:  


 Axial CT images were obtained through the chest without contrast. All CT scans 

at this location are


performed using CT dose reduction for ALARA by means of automated exposure cont

rol.   


 


 COMPARISON:  


 None available.  


 


 FINDINGS:  


 There is marked cardiomegaly. There is a noncontrast examination which limits 

evaluation. There is 


diffuse dilatation of the thoracic aorta with dilatation extending to the 

brachiocephalic and 


descending thoracic aorta. Findings appear similar to the prior examination. 

Atherosclerotic 


constipation throughout the aorta persist. A few mildly prominent paratracheal 

mediastinal nodes 


appears similar to prior exam. There is increased opacity within the lungs most 

significant in the 


right upper lung. Bilateral pleural effusions.  


 


 There is a rounded collection of the right neck just deep to the catheter tip 

and adjacent to the 


thyroid measuring 3 cm. This has a Hounsfield unit 39 which could represent 

hematoma. The catheter 


extends into the right jugular. Degenerative change throughout spine. Bilateral 

renal cysts. 


Abdominal aortic aneurysm measures 5.7 cm  


 


 


 IMPRESSION:  


 1. Rounded density/fluid collection adjacent to the Vas-Cath in the right neck 

just deep to it 


suggesting possible hematoma and cause of bleed.  


 2. Increased opacity throughout the lungs most significant in right upper lung 

could represent 


atypical infiltrate.  


 3. Thoracic aortic aneurysm and abdominal aortic aneurysm appears similar to 

prior exam. This is a 


noncontrast examination which is limited.  


 4. Pleural effusions ============================================  


 





Allied health notes reviewed: nursing

## 2021-07-15 LAB
ALBUMIN SERPL-MCNC: 3 G/DL (ref 3.9–5)
ALT SERPL-CCNC: 274 UNITS/L (ref 7–56)
BASOPHILS # (AUTO): 0 K/MM3 (ref 0–0.1)
BASOPHILS NFR BLD AUTO: 0.4 % (ref 0–1.8)
BUN SERPL-MCNC: 59 MG/DL (ref 9–20)
BUN/CREAT SERPL: 9 %
CALCIUM SERPL-MCNC: 8.1 MG/DL (ref 8.4–10.2)
EOSINOPHIL # BLD AUTO: 0.1 K/MM3 (ref 0–0.4)
EOSINOPHIL NFR BLD AUTO: 1.2 % (ref 0–4.3)
HCT VFR BLD CALC: 23.5 % (ref 35.5–45.6)
HEMOLYSIS INDEX: 5
HGB BLD-MCNC: 7.5 GM/DL (ref 11.8–15.2)
LYMPHOCYTES # BLD AUTO: 1.2 K/MM3 (ref 1.2–5.4)
LYMPHOCYTES NFR BLD AUTO: 15.8 % (ref 13.4–35)
MCHC RBC AUTO-ENTMCNC: 32 % (ref 32–34)
MCV RBC AUTO: 67 FL (ref 84–94)
MONOCYTES # (AUTO): 1 K/MM3 (ref 0–0.8)
MONOCYTES % (AUTO): 13.8 % (ref 0–7.3)
PLATELET # BLD: 123 K/MM3 (ref 140–440)
RBC # BLD AUTO: 3.52 M/MM3 (ref 3.65–5.03)

## 2021-07-15 PROCEDURE — 5A1D70Z PERFORMANCE OF URINARY FILTRATION, INTERMITTENT, LESS THAN 6 HOURS PER DAY: ICD-10-PCS | Performed by: INTERNAL MEDICINE

## 2021-07-15 RX ADMIN — CALCIUM ACETATE SCH: 667 CAPSULE ORAL at 12:11

## 2021-07-15 RX ADMIN — NITROGLYCERIN TRANSDERMAL SYSTEM SCH MG: 0.4 PATCH, EXTENDED RELEASE TRANSDERMAL at 05:30

## 2021-07-15 RX ADMIN — CALCIUM ACETATE SCH MG: 667 CAPSULE ORAL at 09:28

## 2021-07-15 RX ADMIN — EPOETIN ALFA-EPBX PRN UNIT: 20000 INJECTION, SOLUTION INTRAVENOUS; SUBCUTANEOUS at 12:30

## 2021-07-15 RX ADMIN — INSULIN HUMAN SCH: 100 INJECTION, SOLUTION PARENTERAL at 12:11

## 2021-07-15 RX ADMIN — Medication SCH ML: at 09:30

## 2021-07-15 RX ADMIN — INSULIN HUMAN SCH: 100 INJECTION, SOLUTION PARENTERAL at 22:30

## 2021-07-15 RX ADMIN — PANTOPRAZOLE SODIUM SCH MG: 40 INJECTION, POWDER, FOR SOLUTION INTRAVENOUS at 09:26

## 2021-07-15 RX ADMIN — INSULIN HUMAN SCH: 100 INJECTION, SOLUTION PARENTERAL at 08:16

## 2021-07-15 RX ADMIN — CALCIUM ACETATE SCH MG: 667 CAPSULE ORAL at 16:45

## 2021-07-15 RX ADMIN — INSULIN HUMAN SCH: 100 INJECTION, SOLUTION PARENTERAL at 16:46

## 2021-07-15 RX ADMIN — ASPIRIN SCH MG: 81 TABLET, COATED ORAL at 09:26

## 2021-07-15 RX ADMIN — Medication SCH ML: at 22:31

## 2021-07-15 RX ADMIN — FUROSEMIDE SCH MG: 40 TABLET ORAL at 16:45

## 2021-07-15 RX ADMIN — PANTOPRAZOLE SODIUM SCH MG: 40 INJECTION, POWDER, FOR SOLUTION INTRAVENOUS at 22:30

## 2021-07-15 NOTE — PROGRESS NOTE
Assessment and Plan


1. Acute kidney injury superimposed on CKD:


EDE superimposed on CKD .


CT abdomen negative for hydro.


Monitor renal function.


Renal prognosis is guarded to poor.


Avoid nephrotoxic agents.


Meds dosage based on GFR.


Patient was started on hemodialysis due to significant decline in the GFR, 

associated hyperkalemia, metabolic acidosis and volume overload.


Hemodialysis: 7/12, 7/13, 7/15.





2. FEN:


Hyperkalemia, improved, monitor.


Anion-gap Metabolic acidosis, HD today, monitor.


Volume overload, UF with HD.


Monitor lytes.





3. Heavy proteinuria:


?etiology.


STEVE, ANCA, GBM Ab, Complements, UPEP and SPEP ordered.





4. Decompensated CHF:


Echo EF 45-50%.


Strict I/O.


On Carvedilol.


Followed by Cards.





5. Elevated liver enzymes:


Hepatitis serologies negative.





6. GI bleed:


Per GI EGD in the next few days, after CHF/HTN/EDE has been assessed and 

improved.





7. Hypertensive urgency:


BP controlled.


UF with HD as tolerated.


Adjust meds as needed.


Monitor.





8. Hypochromic Anemia, POA:


?2/2 GI bleed.


Followed by GI.


Epogen.


Monitor.





9. Thrombocytopenia.





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





Need outpatient hemodialysis chair.











Subjective:


Patient was seen and examined at the bedside while on HD.











Examination:


General appearance: well-developed, thin built, appears stated age, not in 

distress


HEENT: ATNC, pupils equal


Neck: supple


Respiratory: bibasal diminished breath sounds


Cardiology: regular, S1S2, no murmur


Gastrointestinal: soft, bowel sounds heard, not tender


Integumentary: warm and dry, upper chest hypopigmented patches noted


Neurologic: alert, moving extremities


Ext: no edema


Hemodialysis access: R IJ temp catheter, L Tunnel catheter








Subjective


Date of service: 07/15/21


Principal diagnosis: Anemia, Abnormal LFTs





Objective





- Vital Signs


Vital signs: 


                               Vital Signs - 12hr











  07/14/21 07/14/21 07/15/21





  22:00 23:25 03:51


 


Temperature  99.0 F 98.0 F


 


Pulse Rate 64 66 65


 


Pulse Rate [   





From Monitor]   


 


Respiratory 20 18 18





Rate   


 


Blood Pressure  107/58 123/73


 


O2 Sat by Pulse 100 93 96





Oximetry   














  07/15/21 07/15/21 07/15/21





  05:30 07:26 08:40


 


Temperature  98.6 F 


 


Pulse Rate 64 69 


 


Pulse Rate [   





From Monitor]   


 


Respiratory  16 





Rate   


 


Blood Pressure 123/73 122/66 


 


O2 Sat by Pulse  98 99





Oximetry   














  07/15/21





  09:38


 


Temperature 


 


Pulse Rate 


 


Pulse Rate [ 68





From Monitor] 


 


Respiratory 18





Rate 


 


Blood Pressure 


 


O2 Sat by Pulse 100





Oximetry 














- Lab





                                 07/15/21 04:31





                                 07/15/21 04:31


                             Most recent lab results











ABG pH  7.384 pH Units (7.350-7.450)   07/11/21  14:40    


 


ABG pCO2  32.3 mm Hg  07/11/21  14:40    


 


ABG pO2  107.8 mm Hg (80.0-90.0)  H  07/11/21  14:40    


 


ABG HCO3  18.8 mmol/L (20.0-26.0)  L  07/11/21  14:40    


 


ABG O2 Saturation  97.9 % (95.0-99.0)   07/11/21  14:40    


 


Calcium  8.1 mg/dL (8.4-10.2)  L  07/15/21  04:31    


 


Phosphorus  6.50 mg/dL (2.5-4.5)  H D 07/13/21  05:32    


 


Magnesium  2.30 mg/dL (1.7-2.3)   07/13/21  05:32    


 


Urine Creatinine  78.5 mg/dL (0.1-20.0)  H  07/11/21  15:40    


 


Urine Sodium  76 mmol/L  07/11/21  15:40    


 


Urine Total Protein  745 mg/dL (5-11.8)  H  07/11/21  15:40    














Medications & Allergies





- Medications


Allergies/Adverse Reactions: 


                                    Allergies





No Known Allergies Allergy (Unverified 01/17/15 01:13)


   








Home Medications: 


                                Home Medications











 Medication  Instructions  Recorded  Confirmed  Last Taken  Type


 


Amlodipine Besylate [Norvasc] 10 mg PO QDAY 07/10/21 07/10/21 Unknown History


 


Aspirin EC [Halfprin EC] 81 mg PO QDAY 07/10/21 07/10/21 Unknown History


 


Atorvastatin Calcium [Lipitor] 80 mg PO QDAY 07/10/21 07/10/21 Unknown History


 


Lisinopril [Zestril] 10 mg PO QDAY 07/10/21 07/10/21 Unknown History











Active Medications: 














Generic Name Dose Route Start Last Admin





  Trade Name Freq  PRN Reason Stop Dose Admin


 


Acetaminophen  650 mg  07/11/21 02:17 





  Acetaminophen 325 Mg Tab  PO  





  Q6H PRN  





  Pain MILD(1-3)/Fever >100.5/HA  


 


Amlodipine Besylate  10 mg  07/12/21 13:00  07/14/21 10:33





  Amlodipine 10 Mg Tab  PO   10 mg





  QDAY SOTERO   Administration


 


Aspirin  81 mg  07/12/21 10:00  07/15/21 09:26





  Aspirin Ec 81 Mg Tab  PO   81 mg





  QDAY SOTERO   Administration


 


Calcium Acetate  667 mg  07/13/21 12:00  07/15/21 09:28





  Calcium Acetate 667 Mg Cap  PO   667 mg





  TIDWM SOTERO   Administration


 


Carvedilol  12.5 mg  07/11/21 22:00  07/14/21 21:34





  Carvedilol 12.5 Mg Tab  PO   12.5 mg





  BID SOTERO   Administration


 


Dextrose  0 ml  07/11/21 02:17 





  Dextrose 50% In Water (25gm) 50 Ml Syringe  IV  





  Q30MIN PRN  





  Hypoglycemia  





  Protocol  


 


Furosemide  80 mg  07/14/21 10:00  07/14/21 10:33





  Furosemide 40 Mg Tab  PO   80 mg





  QDAY [facility]   Administration


 


Hydralazine HCl  10 mg  07/11/21 06:13  07/11/21 10:10





  Hydralazine 20 Mg/1 Ml Inj  IV   10 mg





  Q4H PRN   Administration





  Blood Pressure  


 


Sodium Chloride  100 mls @ 999 mls/hr  07/12/21 10:00 





  Nacl 0.9%  IV  





  LIBIA PRN  





  Hypotension  


 


Insulin Human Regular  0 units  07/11/21 07:30  07/15/21 08:16





  Insulin Regular, Human 100 Units/1 Ml  SUB-Q   Not Given





  [facility]  





  Protocol  


 


Isosorbide Mononitrate  30 mg  07/14/21 15:00  07/14/21 17:28





  Isosorbide Mononitrate Er 30 Mg Tab  PO   30 mg





  QDAY [facility]   Administration


 


Magnesium Hydroxide  30 ml  07/11/21 02:17 





  Magnesium Hydroxide (Mom) Oral Liqd Udc  PO  





  Q4H PRN  





  Constipation  


 


Nitroglycerin  0.4 mg  07/13/21 06:00  07/15/21 05:30





  Nitroglycerin 0.4 Mg Patch 24hr  TD   0.4 mg





  QDAY@0600 SOTERO   Administration


 


Ondansetron HCl  4 mg  07/11/21 02:17  07/13/21 16:38





  Ondansetron 4 Mg/2 Ml Inj  IV   4 mg





  Q8H PRN   Administration





  Nausea And Vomiting  


 


Pantoprazole Sodium  40 mg  07/11/21 10:00  07/15/21 09:26





  Pantoprazole 40 Mg Inj  IV   40 mg





  BID SOTERO   Administration


 


Sodium Chloride  10 ml  07/11/21 10:00  07/15/21 09:30





  Sodium Chloride 0.9% 10 Ml Flush Syringe  IV   10 ml





  BID SOTERO   Administration


 


Sodium Chloride  10 ml  07/11/21 02:17 





  Sodium Chloride 0.9% 10 Ml Flush Syringe  IV  





  PRN PRN  





  LINE FLUSH

## 2021-07-15 NOTE — XRAY REPORT
CHEST 1 VIEW 7/15/2021 8:03 AM



INDICATION / CLINICAL INFORMATION: Follow up on pulmonary edema..



COMPARISON: 7/12/2021



FINDINGS:



SUPPORT DEVICES: Left Vas-Cath has been placed the tip overlying the distal SVC. Right jugular line t
ip overlies the distal SVC

HEART / MEDIASTINUM: Tortuosity of the thoracic aorta is unchanged 

LUNGS / PLEURA: Mild increased pulmonary vascularity with mild edema appears improved. No pneumothora
x. 



Signer Name: Juarez Boyd MD 

Signed: 7/15/2021 10:09 AM

Workstation Name: [facility]

## 2021-07-15 NOTE — PROGRESS NOTE
Assessment and Plan





- Patient Problems


(1) Abnormal ECG


Current Visit: Yes   Status: Acute   


Plan to address problem: 


Patient has marked ST and T wave abnormalities in the anterolateral leads, more 

pronounced on the recent ECGs that his previous baseline.  We will continue 

routine daily ECGs for continued assessment.





He has no history of significant coronary artery disease.  Instead, 2 years ago 

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








(2) Preoperative cardiovascular examination


Current Visit: Yes   Status: Acute   


Plan to address problem: 


Continue conservative cardiac management with medical therapy as previously 

outlined.  The planned GI procedure was canceled.








Subjective


Date of service: 07/15/21


Principal diagnosis: Anemia, Abnormal LFTs


Interval history: 





Patient is lethargic, but breathing comfortably, no acute distress.  No new 

cardiac complaints.





Objective


                                   Vital Signs











  Temp Pulse Pulse Resp BP Pulse Ox


 


 07/15/21 09:38    68  18   100


 


 07/15/21 08:40       99


 


 07/15/21 07:26  98.6 F  69   16  122/66  98


 


 07/15/21 07:10   68    


 


 07/15/21 05:30   64    123/73 


 


 07/15/21 03:51  98.0 F  65   18  123/73  96


 


 07/14/21 23:25  99.0 F  66   18  107/58  93


 


 07/14/21 22:00   64   20   100


 


 07/14/21 21:34   70    107/54 


 


 07/14/21 20:45       95


 


 07/14/21 19:41  97.8 F  69   18  107/54  98


 


 07/14/21 15:45  97.9 F  61   18  94/56  97


 


 07/14/21 14:00   67    














- Physical Examination


General: No Apparent Distress


HEENT: Positive: PERRL


Neck: Positive: neck supple


Cardiac: Positive: Reg Rate and Rhythm


Lungs: Positive: Decreased Breath Sounds


Neuro: Positive: Weakness (Generalized weakness, frail and chronically ill 

appearing)


Abdomen: Positive: Soft


Skin: Positive: Clear


Extremities: Absent: normal





- Labs and Meds


                                 Cardiac Enzymes











  07/15/21 Range/Units





  04:31 


 


AST  96 H  (5-40)  units/L








                                       CBC











  07/15/21 Range/Units





  04:31 


 


WBC  7.6  (4.5-11.0)  K/mm3


 


RBC  3.52 L  (3.65-5.03)  M/mm3


 


Hgb  7.5 L  (11.8-15.2)  gm/dl


 


Hct  23.5 L  (35.5-45.6)  %


 


Plt Count  123 L  (140-440)  K/mm3


 


Lymph # (Auto)  1.2  (1.2-5.4)  K/mm3


 


Mono # (Auto)  1.0 H  (0.0-0.8)  K/mm3


 


Eos # (Auto)  0.1  (0.0-0.4)  K/mm3


 


Baso # (Auto)  0.0  (0.0-0.1)  K/mm3








                          Comprehensive Metabolic Panel











  07/15/21 Range/Units





  04:31 


 


Sodium  136 L  (137-145)  mmol/L


 


Potassium  4.0  (3.6-5.0)  mmol/L


 


Chloride  97.0 L  ()  mmol/L


 


Carbon Dioxide  29  (22-30)  mmol/L


 


BUN  59 H  (9-20)  mg/dL


 


Creatinine  6.4 H  (0.8-1.3)  mg/dL


 


Glucose  121 H  ()  mg/dL


 


Calcium  8.1 L  (8.4-10.2)  mg/dL


 


AST  96 H  (5-40)  units/L


 


ALT  274 H  (7-56)  units/L


 


Alkaline Phosphatase  92  ()  units/L


 


Total Protein  5.1 L  (6.3-8.2)  g/dL


 


Albumin  3.0 L  (3.9-5)  g/dL














- Allied health notes


Allied health notes reviewed: nursing

## 2021-07-15 NOTE — ELECTROCARDIOGRAPH REPORT
[facility]

                                       

Test Date:    2021               Test Time:    10:38:33

Pat Name:     LORETTA MOORE              Department:   

Patient ID:   [id number]          Room:         A467

Gender:       M                        Technician:   PARISH

:          1940               Requested By: AMY KOCHERLA

Order Number: [id number]              Reading MD:   Toño Stafford

                                 Measurements

Intervals                              Axis          

Rate:         71                       P:            27

WY:           219                      QRS:          23

QRSD:         110                      T:            176

QT:           460                                    

QTc:          502                                    

                           Interpretive Statements

Sinus rhythm

Borderline prolonged [place] interval

Probable left atrial enlargement

LVH with secondary repolarization abnormality

Anterior ST elevation, probably due to LVH

Prolonged QT interval

Compared to ECG 2021 12:41:02

No significant change noted.

Electronically Signed On 7- 10:41:23 EDT by Toño Stafford

## 2021-07-15 NOTE — PROGRESS NOTE
Assessment and Plan


Assessment and plan: 


-- Anemia


Current Visit: Yes   Status: Acute   


Possibly from GI bleed.


Received 1 unit of PRBC transfusion, Hb today 7.9


Stool Hemoccult done in the ER was negative


Patient has no new episodes of bleeding, hemoglobin today 7.5


Due to cardiac risk factors, cardiology not cleared for elective endoscopy, GI 

following





--GI bleed


Current Visit: Yes   Status: Acute   


GI evaluated the patient


Planning for endoscopy however


Cardiology did not clear for the procedure due to cardiac risk factors


Unless actively bleeding due to risk factors





--Acute kidney injury superimposed on chronic kidney disease


Current Visit: Yes   Status: Acute   


Patient  with chronic kidney disease.  Worsening renal function


s/p Permacath placement, hemodialysis 7/12, 7/13


Nephrology following, outpatient HD chair placement at discharge per renal





-- Hyperkalemia/present on admission


Current Visit: Yes   Status: Acute   


Patient is received insulin and glucose, calcium gluconate sodium bicarb while 

in the  emergency room


Normal potassium levels, monitor electrolytes





--Acute liver injury /transaminitis


Current Visit: Yes   Status: Acute   


Plan to address problem: 


CT of the abdomen and pelvis did not reveal any acute abnormality.


Trending down.  -96, -to 74


GI following





--Hypertensive urgency


Current Visit: Yes   Status: Acute   


Moderate control , continue current antihypertensives 


As needed medications , closely monitor





-- Pulmonary edema


Current Visit: Yes   Status: Acute   


Plan to address problem: 


Pulmonary edema shown on CT scan of the abdomen and pelvis.


Patient had a dose of IV Lasix.


Will check echocardiogram.


We will place a consult to cardiology for evaluation.





--DVT prophylaxis


Current Visit: Yes   Status: Acute   


Plan to address problem: 


Patient placed on sequential compression device.





-- Full code status


Current Visit: Yes   Status: Acute   


Plan to address problem: 


Patient is full code.





Follow consultants recommendations








7/13/2021; patient received permacath placement


Hemodialysis per schedule, possible endoscopy tomorrow





7/14/2021; patient feels slightly better, GI waiting for


Cardiology clearance for endoscopy





7/15/2021; no new episodes of GI bleeding


HPI low stable 7.5 today


Due to multiple cardiac risk factors, cardiology did not


For elective endoscopy, unless life-threatening bleeding.

















History


Interval history: 


I seen and examined the patient at the bedside


Patient's chart and medications reviewed


Patient is minimally communicative


Severely emaciated and cachectic


Vital signs noted








Hospitalist Physical





- Constitutional


Vitals: 


                                        











Temp Pulse Resp BP Pulse Ox


 


 98.6 F   69   16   122/66   98 


 


 07/15/21 07:26  07/15/21 07:26  07/15/21 07:26  07/15/21 07:26  07/15/21 07:26











General appearance: Present: no acute distress, cachectic, other (Patient is 

lethargic, frail and chronically ill-appearing)





- EENT


Eyes: Present: PERRL, EOM intact





- Neck


Neck: Present: supple, normal ROM (Dr. ZEKE Smith yes yes)





- Respiratory


Respiratory effort: normal


Respiratory: bilateral: diminished, negative: rales, rhonchi, wheezing





- Cardiovascular


Rhythm: regular


Heart Sounds: Present: S1 & S2





- Extremities


Extremities: no ischemia, No edema





- Abdominal


General gastrointestinal: soft, non-tender, non-distended, normal bowel sounds





- Integumentary


Integumentary: Present: clear, warm





- Psychiatric


Psychiatric: appropriate mood/affect (As above), cooperative





- Neurologic


Neurologic: moves all extremities





Results





- Labs


CBC & Chem 7: 


                                 07/15/21 04:31





                                 07/15/21 04:31


Labs: 


                             Laboratory Last Values











WBC  7.6 K/mm3 (4.5-11.0)   07/15/21  04:31    


 


RBC  3.52 M/mm3 (3.65-5.03)  L  07/15/21  04:31    


 


Hgb  7.5 gm/dl (11.8-15.2)  L  07/15/21  04:31    


 


Hct  23.5 % (35.5-45.6)  L  07/15/21  04:31    


 


MCV  67 fl (84-94)  L  07/15/21  04:31    


 


MCH  21 pg (28-32)  L  07/15/21  04:31    


 


MCHC  32 % (32-34)   07/15/21  04:31    


 


RDW  18.3 % (13.2-15.2)  H  07/15/21  04:31    


 


Plt Count  123 K/mm3 (140-440)  L  07/15/21  04:31    


 


Lymph % (Auto)  15.8 % (13.4-35.0)   07/15/21  04:31    


 


Mono % (Auto)  13.8 % (0.0-7.3)  H  07/15/21  04:31    


 


Eos % (Auto)  1.2 % (0.0-4.3)   07/15/21  04:31    


 


Baso % (Auto)  0.4 % (0.0-1.8)   07/15/21  04:31    


 


Lymph # (Auto)  1.2 K/mm3 (1.2-5.4)   07/15/21  04:31    


 


Mono # (Auto)  1.0 K/mm3 (0.0-0.8)  H  07/15/21  04:31    


 


Eos # (Auto)  0.1 K/mm3 (0.0-0.4)   07/15/21  04:31    


 


Baso # (Auto)  0.0 K/mm3 (0.0-0.1)   07/15/21  04:31    


 


Add Manual Diff  Complete   07/12/21  04:25    


 


Total Counted  100   07/12/21  04:25    


 


Seg Neutrophils %  68.8 % (40.0-70.0)   07/15/21  04:31    


 


Seg Neuts % (Manual)  87.0 % (40.0-70.0)  H  07/12/21  04:25    


 


Band Neutrophils %  1.0 %  07/12/21  04:25    


 


Lymphocytes % (Manual)  5.0 % (13.4-35.0)  L  07/12/21  04:25    


 


Monocytes % (Manual)  7.0 % (0.0-7.3)   07/12/21  04:25    


 


Nucleated RBC %  1.0 % (0.0-0.9)  H  07/12/21  04:25    


 


Seg Neutrophils #  5.3 K/mm3 (1.8-7.7)   07/15/21  04:31    


 


Seg Neutrophils # Man  7.0 K/mm3 (1.8-7.7)   07/12/21  04:25    


 


Band Neutrophils #  0.1 K/mm3  07/12/21  04:25    


 


Lymphocytes # (Manual)  0.4 K/mm3 (1.2-5.4)  L  07/12/21  04:25    


 


Abs React Lymphs (Man)  0.0 K/mm3  07/12/21  04:25    


 


Monocytes # (Manual)  0.6 K/mm3 (0.0-0.8)   07/12/21  04:25    


 


Eosinophils # (Manual)  0.0 K/mm3 (0.0-0.4)   07/12/21  04:25    


 


Basophils # (Manual)  0.0 K/mm3 (0.0-0.1)   07/12/21  04:25    


 


Metamyelocytes #  0.0 K/mm3  07/12/21  04:25    


 


Myelocytes #  0.0 K/mm3  07/12/21  04:25    


 


Promyelocytes #  0.0 K/mm3  07/12/21  04:25    


 


Blast Cells #  0.0 K/mm3  07/12/21  04:25    


 


WBC Morphology  Not Reportable   07/12/21  04:25    


 


Hypersegmented Neuts  Not Reportable   07/12/21  04:25    


 


Hyposegmented Neuts  Not Reportable   07/12/21  04:25    


 


Hypogranular Neuts  Not Reportable   07/12/21  04:25    


 


Smudge Cells  Not Reportable   07/12/21  04:25    


 


Toxic Granulation  Not Reportable   07/12/21  04:25    


 


Toxic Vacuolation  Not Reportable   07/12/21  04:25    


 


Dohle Bodies  Not Reportable   07/12/21  04:25    


 


Pelger-Huet Anomaly  Not Reportable   07/12/21  04:25    


 


Curt Rods  Not Reportable   07/12/21  04:25    


 


Platelet Estimate  Consistent w auto   07/12/21  04:25    


 


Clumped Platelets  Not Reportable   07/12/21  04:25    


 


Plt Clumps, EDTA  Not Reportable   07/12/21  04:25    


 


Large Platelets  Not Reportable   07/12/21  04:25    


 


Giant Platelets  Not Reportable   07/12/21  04:25    


 


Platelet Satelliting  Not Reportable   07/12/21  04:25    


 


Plt Morphology Comment  Not Reportable   07/12/21  04:25    


 


RBC Morphology  Not Reportable   07/12/21  04:25    


 


Dimorphic RBCs  Not Reportable   07/12/21  04:25    


 


Polychromasia  Few   07/12/21  04:25    


 


Hypochromasia  2+   07/12/21  04:25    


 


Poikilocytosis  Not Reportable   07/12/21  04:25    


 


Anisocytosis  2+   07/12/21  04:25    


 


Microcytosis  Not Reportable   07/12/21  04:25    


 


Macrocytosis  Not Reportable   07/12/21  04:25    


 


Spherocytes  Not Reportable   07/12/21  04:25    


 


Pappenheimer Bodies  Not Reportable   07/12/21  04:25    


 


Sickle Cells  Not Reportable   07/12/21  04:25    


 


Target Cells  Not Reportable   07/12/21  04:25    


 


Tear Drop Cells  Not Reportable   07/12/21  04:25    


 


Ovalocytes  Not Reportable   07/12/21  04:25    


 


Helmet Cells  Not Reportable   07/12/21  04:25    


 


Negrete-Knierim Bodies  Not Reportable   07/12/21  04:25    


 


Cabot Rings  Not Reportable   07/12/21  04:25    


 


Richmond Cells  Not Reportable   07/12/21  04:25    


 


Bite Cells  Not Reportable   07/12/21  04:25    


 


Crenated Cell  Not Reportable   07/12/21  04:25    


 


Elliptocytes  Not Reportable   07/12/21  04:25    


 


Acanthocytes (Spur)  Not Reportable   07/12/21  04:25    


 


Rouleaux  Not Reportable   07/12/21  04:25    


 


Hemoglobin C Crystals  Not Reportable   07/12/21  04:25    


 


Schistocytes  1+   07/12/21  04:25    


 


Malaria parasites  Not Reportable   07/12/21  04:25    


 


Bryan Bodies  Not Reportable   07/12/21  04:25    


 


Hem Pathologist Commnt  No   07/12/21  04:25    


 


PT  18.4 Sec. (12.2-14.9)  H  07/13/21  19:20    


 


INR  1.48  (0.87-1.13)  H  07/13/21  19:20    


 


APTT  33.8 Sec. (24.2-36.6)   07/13/21  19:20    


 


ABG pH  7.384 pH Units (7.350-7.450)   07/11/21  14:40    


 


ABG pCO2  32.3 mm Hg  07/11/21  14:40    


 


ABG pO2  107.8 mm Hg (80.0-90.0)  H  07/11/21  14:40    


 


ABG HCO3  18.8 mmol/L (20.0-26.0)  L  07/11/21  14:40    


 


ABG O2 Saturation  97.9 % (95.0-99.0)   07/11/21  14:40    


 


ABG O2 Content  10.3  (0.0-44)   07/11/21  14:40    


 


ABG Base Excess  -5.6 mmol/L (-2.0-3.0)  L  07/11/21  14:40    


 


ABG Hemoglobin  7.4 gm/dl (14.0-18.0)  L  07/11/21  14:40    


 


ABG Carboxyhemoglobin  1.5 % (0.0-5.0)   07/11/21  14:40    


 


ABG Methemoglobin  0.5 % (0.0-1.5)   07/11/21  14:40    


 


Oxyhemoglobin  95.9 % (95.0-99.0)   07/11/21  14:40    


 


FiO2  28 %  07/11/21  14:40    


 


Sodium  136 mmol/L (137-145)  L  07/15/21  04:31    


 


Potassium  4.0 mmol/L (3.6-5.0)   07/15/21  04:31    


 


Chloride  97.0 mmol/L ()  L  07/15/21  04:31    


 


Carbon Dioxide  29 mmol/L (22-30)   07/15/21  04:31    


 


Anion Gap  14 mmol/L  07/15/21  04:31    


 


BUN  59 mg/dL (9-20)  H  07/15/21  04:31    


 


Creatinine  6.4 mg/dL (0.8-1.3)  H  07/15/21  04:31    


 


Estimated GFR  8 ml/min  07/15/21  04:31    


 


BUN/Creatinine Ratio  9 %  07/15/21  04:31    


 


Glucose  121 mg/dL ()  H  07/15/21  04:31    


 


POC Glucose  143 mg/dL ()  H  07/15/21  07:53    


 


Hemoglobin A1c  5.3 % (4-6)   07/11/21  02:00    


 


Calcium  8.1 mg/dL (8.4-10.2)  L  07/15/21  04:31    


 


Phosphorus  6.50 mg/dL (2.5-4.5)  H D 07/13/21  05:32    


 


Magnesium  2.30 mg/dL (1.7-2.3)   07/13/21  05:32    


 


Total Bilirubin  0.60 mg/dL (0.1-1.2)   07/15/21  04:31    


 


AST  96 units/L (5-40)  H  07/15/21  04:31    


 


ALT  274 units/L (7-56)  H  07/15/21  04:31    


 


Alkaline Phosphatase  92 units/L ()   07/15/21  04:31    


 


Total Protein  5.1 g/dL (6.3-8.2)  L  07/15/21  04:31    


 


Albumin  3.0 g/dL (3.9-5)  L  07/15/21  04:31    


 


Albumin/Globulin Ratio  1.4 %  07/15/21  04:31    


 


Lipase  51 units/L (13-60)   07/10/21  22:43    


 


PTH Intact  176.3 pg/mL (15-65)  H  07/12/21  04:25    


 


Urine Color  Yellow  (Yellow)   07/10/21  Unknown


 


Urine Turbidity  Cloudy  (Clear)   07/10/21  Unknown


 


Urine pH  5.0  (5.0-7.0)   07/10/21  Unknown


 


Ur Specific Gravity  1.018  (1.003-1.030)   07/10/21  Unknown


 


Urine Protein  >500 mg/dL (Negative)   07/10/21  Unknown


 


Urine Glucose (UA)  50 mg/dL (Negative)   07/10/21  Unknown


 


Urine Ketones  Neg mg/dL (Negative)   07/10/21  Unknown


 


Urine Blood  Mod  (Negative)   07/10/21  Unknown


 


Urine Nitrite  Neg  (Negative)   07/10/21  Unknown


 


Urine Bilirubin  Neg  (Negative)   07/10/21  Unknown


 


Urine Urobilinogen  < 2.0 mg/dL (<2.0)   07/10/21  Unknown


 


Ur Leukocyte Esterase  Neg  (Negative)   07/10/21  Unknown


 


Urine WBC (Auto)  4.0 /HPF (0.0-6.0)   07/10/21  Unknown


 


Urine RBC (Auto)  10.0 /HPF (0.0-6.0)   07/10/21  Unknown


 


U Epithel Cells (Auto)  1.0 /HPF (0-13.0)   07/10/21  Unknown


 


Urine Mucus  Few /HPF  07/10/21  Unknown


 


Urine Creatinine  78.5 mg/dL (0.1-20.0)  H  07/11/21  15:40    


 


Protein/Creatinin Ratio  9.49   07/11/21  15:40    


 


Urine Sodium  76 mmol/L  07/11/21  15:40    


 


Urine Total Protein  745 mg/dL (5-11.8)  H  07/11/21  15:40    


 


Hepatitis A IgM Ab  Non-reactive  (NonReactive)   07/11/21  00:00    


 


Hep Bs Antigen  Non-reactive  (Negative)   07/11/21  00:00    


 


Hep B Core IgM Ab  Non-reactive  (NonReactive)   07/11/21  00:00    


 


Hepatitis C Antibody  Non-reactive  (NonReactive)   07/11/21  00:00    


 


Blood Type  O POSITIVE   07/12/21  10:53    


 


Antibody Screen  Negative   07/12/21  10:53    


 


Crossmatch  See Detail   07/12/21  10:53    











Malave/IV: 


                                        





Voiding Method                   Urinal











Active Medications





- Current Medications


Current Medications: 














Generic Name Dose Route Start Last Admin





  Trade Name Freq  PRN Reason Stop Dose Admin


 


Acetaminophen  650 mg  07/11/21 02:17 





  Acetaminophen 325 Mg Tab  PO  





  Q6H PRN  





  Pain MILD(1-3)/Fever >100.5/HA  


 


Amlodipine Besylate  10 mg  07/12/21 13:00  07/14/21 10:33





  Amlodipine 10 Mg Tab  PO   10 mg





  QDAY SOTERO   Administration


 


Aspirin  81 mg  07/12/21 10:00  07/14/21 10:33





  Aspirin Ec 81 Mg Tab  PO   81 mg





  QDAY SOTERO   Administration


 


Calcium Acetate  667 mg  07/13/21 12:00  07/14/21 17:28





  Calcium Acetate 667 Mg Cap  PO   667 mg





  TIDWM SOTERO   Administration


 


Carvedilol  12.5 mg  07/11/21 22:00  07/14/21 21:34





  Carvedilol 12.5 Mg Tab  PO   12.5 mg





  BID SOTERO   Administration


 


Dextrose  0 ml  07/11/21 02:17 





  Dextrose 50% In Water (25gm) 50 Ml Syringe  IV  





  Q30MIN PRN  





  Hypoglycemia  





  Protocol  


 


Furosemide  80 mg  07/14/21 10:00  07/14/21 10:33





  Furosemide 40 Mg Tab  PO   80 mg





  QDAY SOTERO   Administration


 


Hydralazine HCl  10 mg  07/11/21 06:13  07/11/21 10:10





  Hydralazine 20 Mg/1 Ml Inj  IV   10 mg





  Q4H PRN   Administration





  Blood Pressure  


 


Sodium Chloride  100 mls @ 999 mls/hr  07/12/21 10:00 





  Nacl 0.9%  IV  





  LIBIA PRN  





  Hypotension  


 


Insulin Human Regular  0 units  07/11/21 07:30  07/15/21 08:16





  Insulin Regular, Human 100 Units/1 Ml  SUB-Q   Not Given





  [facility]  





  Protocol  


 


Isosorbide Mononitrate  30 mg  07/14/21 15:00  07/14/21 17:28





  Isosorbide Mononitrate Er 30 Mg Tab  PO   30 mg





  QDAY STOERO   Administration


 


Magnesium Hydroxide  30 ml  07/11/21 02:17 





  Magnesium Hydroxide (Mom) Oral Liqd Udc  PO  





  Q4H PRN  





  Constipation  


 


Nitroglycerin  0.4 mg  07/13/21 06:00  07/15/21 05:30





  Nitroglycerin 0.4 Mg Patch 24hr  TD   0.4 mg





  QDAY@0600 SOTERO   Administration


 


Ondansetron HCl  4 mg  07/11/21 02:17  07/13/21 16:38





  Ondansetron 4 Mg/2 Ml Inj  IV   4 mg





  Q8H PRN   Administration





  Nausea And Vomiting  


 


Pantoprazole Sodium  40 mg  07/11/21 10:00  07/14/21 21:34





  Pantoprazole 40 Mg Inj  IV   40 mg





  BID SOTERO   Administration


 


Sodium Chloride  10 ml  07/11/21 10:00  07/14/21 21:35





  Sodium Chloride 0.9% 10 Ml Flush Syringe  IV   10 ml





  BID SOTERO   Administration


 


Sodium Chloride  10 ml  07/11/21 02:17 





  Sodium Chloride 0.9% 10 Ml Flush Syringe  IV  





  PRN PRN  





  LINE FLUSH  














Nutrition/Malnutrition Assess





- Dietary Evaluation


Nutrition/Malnutrition Findings: 


                                        





Nutrition Notes                                            Start:  07/11/21 

12:02


Freq:                                                      Status: Active       




Protocol:                                                                       




 Document     07/14/21 14:52    (Rec: 07/14/21 14:56    [id number])


 Nutrition Notes


     Initial or Follow up                        Reassessment


     Current Diagnosis                           Acute Kidney Injury,Diabetes,


                                                 Hypertension,Heart Failure,


                                                 Hyperlipidemia


     Other Pertinent Diagnosis                   elevated LFTs, GIB, anemia


     Current Diet                                Cardiac, Consistent CHO


     Labs/Tests                                  Reviewed


     Pertinent Medications                       Lasix


                                                 PhosLo


     Height                                      5 ft 5 in


     Weight                                      46.2 kg


     Ideal Body Weight (kg)                      61.81


     BMI                                         16.9


     Weight change and time frame                Wt change noted, will follow


                                                 trends


     Weight Status                               Underweight


     Subjective/Other Information                FU for intakes. Pt eating <50%


                                                 of meals. He does not think


                                                 he has lost any weight and is


                                                 unsure of UBW. Pt did not get


                                                 ONS due to new diet order. Due


                                                 to advanced age, pt not


                                                 appropriate for diet education


                                                 .


     Percent of energy/protein needs met:        71%/69%


     Burn                                        Absent


     Trauma                                      Absent


     GI Symptoms                                 None


     Current % PO                                Poor (25-49%)


     Minimum of two criteria                     Yes


     Energy Intake (non-severe)                  <75% Estimated Energy


                                                 Requirement >7 days


     Muscle Mass                                 Mild Depletion (non-severe)


     #1


      Nutrition Diagnosis                        Malnutrition


      Diagnosis Progress(for reassessment        Continues


       documentation)                            


     Is patient on ventilator?                   No


     Is Patient Ambulatory and/or Out of Bed     No


     REE-([place]-confined to bed)      [phone]


     Calculation Used for Recommendations        [place]


     Additional Notes                            Protein: (1.2-1.5g/kg) 62-77g


                                                 Fluid: 1 ml/kcal or per MD


 Nutrition Intervention


     Change Diet Order:                          Continue


     Add Supplement/Snack (indicate name/kcal    Nepro BID


      /protein )                                 


     Provides kCal:                              850


     Provides Protein (gm)                       38


     Goal #1                                     Meet at least 80% of protein


                                                 and energy needs via PO and


                                                 ONS intakes


     Goal #2                                     Weight gain/maintenance


     Anticipated Discharge Needs:                Cardiac, Consistent CHO


     Follow-Up By:                               07/16/21


     Additional Comments                         FU for intakes, ONS tolerance

## 2021-07-16 RX ADMIN — INSULIN HUMAN SCH: 100 INJECTION, SOLUTION PARENTERAL at 16:40

## 2021-07-16 RX ADMIN — NITROGLYCERIN TRANSDERMAL SYSTEM SCH MG: 0.4 PATCH, EXTENDED RELEASE TRANSDERMAL at 06:06

## 2021-07-16 RX ADMIN — FUROSEMIDE SCH MG: 40 TABLET ORAL at 09:43

## 2021-07-16 RX ADMIN — INSULIN HUMAN SCH UNITS: 100 INJECTION, SOLUTION PARENTERAL at 12:58

## 2021-07-16 RX ADMIN — INSULIN HUMAN SCH: 100 INJECTION, SOLUTION PARENTERAL at 22:20

## 2021-07-16 RX ADMIN — CALCIUM ACETATE SCH MG: 667 CAPSULE ORAL at 16:40

## 2021-07-16 RX ADMIN — Medication SCH ML: at 09:43

## 2021-07-16 RX ADMIN — ASPIRIN SCH MG: 81 TABLET, COATED ORAL at 09:42

## 2021-07-16 RX ADMIN — PANTOPRAZOLE SODIUM SCH MG: 40 INJECTION, POWDER, FOR SOLUTION INTRAVENOUS at 22:23

## 2021-07-16 RX ADMIN — INSULIN HUMAN SCH: 100 INJECTION, SOLUTION PARENTERAL at 08:22

## 2021-07-16 RX ADMIN — CALCIUM ACETATE SCH MG: 667 CAPSULE ORAL at 09:42

## 2021-07-16 RX ADMIN — Medication SCH ML: at 22:23

## 2021-07-16 RX ADMIN — CALCIUM ACETATE SCH MG: 667 CAPSULE ORAL at 12:58

## 2021-07-16 RX ADMIN — PANTOPRAZOLE SODIUM SCH MG: 40 INJECTION, POWDER, FOR SOLUTION INTRAVENOUS at 09:43

## 2021-07-16 NOTE — PROGRESS NOTE
Assessment and Plan


1. Acute kidney injury superimposed on CKD:


EDE superimposed on CKD .


CT abdomen negative for hydro.


Monitor renal function.


Renal prognosis is guarded to poor.


Avoid nephrotoxic agents.


Meds dosage based on GFR.


Patient was started on hemodialysis due to significant decline in the GFR, 

associated hyperkalemia, metabolic acidosis and volume overload.


Hemodialysis: 7/12, 7/13, 7/15.





2. FEN:


Hyperkalemia, improved, monitor.


Anion-gap Metabolic acidosis, HD today, monitor.


Volume overload, UF with HD.


Monitor lytes.





3. Heavy proteinuria:


?etiology.


STEVE, ANCA, GBM Ab, Complements, UPEP and SPEP ordered.





4. Decompensated CHF:


Echo EF 45-50%.


Strict I/O.


On Carvedilol.


Followed by Cards.





5. Elevated liver enzymes:


Hepatitis serologies negative.





6. GI bleed:


Per GI EGD after clearance from Cards.





7. Hypertensive urgency:


BP controlled.


UF with HD as tolerated.


Adjust meds as needed.


Monitor.





8. Hypochromic Anemia, POA:


?2/2 GI bleed.


Followed by GI.


Epogen.


Monitor.





9. Thrombocytopenia.





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





Need outpatient hemodialysis chair, d/w CM.











Subjective:


Patient was seen and examined at the bedside.











Examination:


General appearance: well-developed, thin built, appears stated age, not in 

distress


HEENT: ATNC, pupils equal


Neck: supple


Respiratory: bibasal diminished breath sounds


Cardiology: regular, S1S2, no murmur


Gastrointestinal: soft, bowel sounds heard, not tender


Integumentary: warm and dry, upper chest hypopigmented patches noted


Neurologic: alert, moving extremities


Ext: no edema


Hemodialysis access: R IJ temp catheter, L Tunnel catheter








Subjective


Date of service: 07/16/21


Principal diagnosis: Anemia, Abnormal LFTs





Objective





- Vital Signs


Vital signs: 


                               Vital Signs - 12hr











  07/15/21 07/16/21 07/16/21





  23:41 04:00 07:23


 


Temperature 99.4 F 98.4 F 


 


Pulse Rate 84 76 


 


Pulse Rate [   66





From Monitor]   


 


Respiratory 19 18 18





Rate   


 


Blood Pressure 115/67 125/71 


 


O2 Sat by Pulse 92 93 100





Oximetry   














  07/16/21 07/16/21 07/16/21





  07:45 09:40 09:42


 


Temperature 97.5 F L  


 


Pulse Rate 74  74


 


Pulse Rate [   





From Monitor]   


 


Respiratory 16  





Rate   


 


Blood Pressure 141/83  141/83


 


O2 Sat by Pulse 94 93 





Oximetry   














  07/16/21





  09:43


 


Temperature 


 


Pulse Rate 74


 


Pulse Rate [ 





From Monitor] 


 


Respiratory 





Rate 


 


Blood Pressure 141/83


 


O2 Sat by Pulse 





Oximetry 














- Lab





                                 07/15/21 04:31





                                 07/15/21 04:31


                             Most recent lab results











ABG pH  7.384 pH Units (7.350-7.450)   07/11/21  14:40    


 


ABG pCO2  32.3 mm Hg  07/11/21  14:40    


 


ABG pO2  107.8 mm Hg (80.0-90.0)  H  07/11/21  14:40    


 


ABG HCO3  18.8 mmol/L (20.0-26.0)  L  07/11/21  14:40    


 


ABG O2 Saturation  97.9 % (95.0-99.0)   07/11/21  14:40    


 


Calcium  8.1 mg/dL (8.4-10.2)  L  07/15/21  04:31    


 


Phosphorus  6.50 mg/dL (2.5-4.5)  H D 07/13/21  05:32    


 


Magnesium  2.30 mg/dL (1.7-2.3)   07/13/21  05:32    


 


Urine Creatinine  78.5 mg/dL (0.1-20.0)  H  07/11/21  15:40    


 


Urine Sodium  76 mmol/L  07/11/21  15:40    


 


Urine Total Protein  745 mg/dL (5-11.8)  H  07/11/21  15:40    














Medications & Allergies





- Medications


Allergies/Adverse Reactions: 


                                    Allergies





No Known Allergies Allergy (Unverified 01/17/15 01:13)


   








Home Medications: 


                                Home Medications











 Medication  Instructions  Recorded  Confirmed  Last Taken  Type


 


Amlodipine Besylate [Norvasc] 10 mg PO QDAY 07/10/21 07/10/21 Unknown History


 


Aspirin EC [Halfprin EC] 81 mg PO QDAY 07/10/21 07/10/21 Unknown History


 


Atorvastatin Calcium [Lipitor] 80 mg PO QDAY 07/10/21 07/10/21 Unknown History


 


Lisinopril [Zestril] 10 mg PO QDAY 07/10/21 07/10/21 Unknown History











Active Medications: 














Generic Name Dose Route Start Last Admin





  Trade Name Freq  PRN Reason Stop Dose Admin


 


Acetaminophen  650 mg  07/11/21 02:17 





  Acetaminophen 325 Mg Tab  PO  





  Q6H PRN  





  Pain MILD(1-3)/Fever >100.5/HA  


 


Amlodipine Besylate  10 mg  07/12/21 13:00  07/16/21 09:43





  Amlodipine 10 Mg Tab  PO   10 mg





  QDAY SOTERO   Administration


 


Aspirin  81 mg  07/12/21 10:00  07/16/21 09:42





  Aspirin Ec 81 Mg Tab  PO   81 mg





  QDAY SOTERO   Administration


 


Calcium Acetate  667 mg  07/13/21 12:00  07/16/21 09:42





  Calcium Acetate 667 Mg Cap  PO   667 mg





  TIDWM SOTERO   Administration


 


Carvedilol  12.5 mg  07/11/21 22:00  07/16/21 09:42





  Carvedilol 12.5 Mg Tab  PO   12.5 mg





  BID SOTERO   Administration


 


Dextrose  0 ml  07/11/21 02:17 





  Dextrose 50% In Water (25gm) 50 Ml Syringe  IV  





  Q30MIN PRN  





  Hypoglycemia  





  Protocol  


 


Furosemide  80 mg  07/14/21 10:00  07/16/21 09:43





  Furosemide 40 Mg Tab  PO   80 mg





  QDAY SOTERO   Administration


 


Hydralazine HCl  10 mg  07/11/21 06:13  07/11/21 10:10





  Hydralazine 20 Mg/1 Ml Inj  IV   10 mg





  Q4H PRN   Administration





  Blood Pressure  


 


Sodium Chloride  100 mls @ 999 mls/hr  07/12/21 10:00 





  Nacl 0.9%  IV  





  LIBIA PRN  





  Hypotension  


 


Insulin Human Regular  0 units  07/11/21 07:30  07/16/21 08:22





  Insulin Regular, Human 100 Units/1 Ml  SUB-Q   Not Given





  [facility]  





  Protocol  


 


Isosorbide Mononitrate  30 mg  07/14/21 15:00  07/16/21 09:42





  Isosorbide Mononitrate Er 30 Mg Tab  PO   30 mg





  QDAY [facility]   Administration


 


Magnesium Hydroxide  30 ml  07/11/21 02:17 





  Magnesium Hydroxide (Mom) Oral Liqd Udc  PO  





  Q4H PRN  





  Constipation  


 


Nitroglycerin  0.4 mg  07/13/21 06:00  07/16/21 06:06





  Nitroglycerin 0.4 Mg Patch 24hr  TD   0.4 mg





  QDAY@0600 SOTERO   Administration


 


Ondansetron HCl  4 mg  07/11/21 02:17  07/13/21 16:38





  Ondansetron 4 Mg/2 Ml Inj  IV   4 mg





  Q8H PRN   Administration





  Nausea And Vomiting  


 


Pantoprazole Sodium  40 mg  07/11/21 10:00  07/16/21 09:43





  Pantoprazole 40 Mg Inj  IV   40 mg





  BID SOTERO   Administration


 


Sodium Chloride  10 ml  07/11/21 10:00  07/16/21 09:43





  Sodium Chloride 0.9% 10 Ml Flush Syringe  IV   10 ml





  BID SOTERO   Administration


 


Sodium Chloride  10 ml  07/11/21 02:17 





  Sodium Chloride 0.9% 10 Ml Flush Syringe  IV  





  PRN PRN  





  LINE FLUSH

## 2021-07-16 NOTE — PROGRESS NOTE
Assessment and Plan





- Patient Problems


(1) Abnormal ECG


Current Visit: Yes   Status: Acute   


Plan to address problem: 


Patient has marked ST and T wave abnormalities in the anterolateral leads, more 

pronounced on the recent ECGs that his previous baseline.  We will continue 

routine daily ECGs for continued assessment.





He has no history of significant coronary artery disease.  Instead, 2 years ago 

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








(2) Preoperative cardiovascular examination


Current Visit: Yes   Status: Acute   





Subjective


Date of service: 07/16/21


Principal diagnosis: Anemia, Abnormal LFTs


Interval history: 





Patient is comfortable no acute distress, resting comfortably in his room.





Objective


                                   Vital Signs











  Temp Pulse Pulse Resp BP Pulse Ox


 


 07/16/21 12:20  98.4 F  73   20  115/64  94


 


 07/16/21 09:43   74    141/83 


 


 07/16/21 09:42   74    141/83 


 


 07/16/21 09:40       93


 


 07/16/21 07:45  97.5 F L  74   16  141/83  94


 


 07/16/21 07:23    66  18   100


 


 07/16/21 06:55   74    


 


 07/16/21 04:00  98.4 F  76   18  125/71  93


 


 07/15/21 23:41  99.4 F  84   19  115/67  92


 


 07/15/21 20:45  98.7 F  82   20  133/74  93


 


 07/15/21 20:40   79    


 


 07/15/21 16:45   79    135/77 


 


 07/15/21 16:44   79    135/77 














- Physical Examination


General: No Apparent Distress


HEENT: Positive: PERRL


Neck: Positive: neck supple


Cardiac: Positive: Reg Rate and Rhythm


Lungs: Positive: Decreased Breath Sounds


Neuro: Positive: Weakness (Generalized weakness, frail and chronically ill 

appearing)


Abdomen: Positive: Soft


Skin: Positive: Clear


Extremities: Absent: normal





- Allied health notes


Allied health notes reviewed: nursing

## 2021-07-16 NOTE — PROGRESS NOTE
Assessment and Plan


Assessment and plan: 


--Acute kidney injury superimposed on chronic kidney disease


Current Visit: Yes   Status: Acute   


Patient  with chronic kidney disease.  Worsening renal function


s/p Permacath placement, hemodialysis 7/12, 7/13


Nephrology following, outpatient HD chair placement at discharge per renal





-- Hyperkalemia/present on admission


Current Visit: Yes   Status: Acute   


Patient is received insulin and glucose, calcium gluconate sodium bicarb while 

in the  emergency room


Normal potassium levels, monitor electrolytes





-- Anemia


Current Visit: Yes   Status: Acute   


Possibly from GI bleed.


Received 1 unit of PRBC transfusion, Hb today 7.9


Stool Hemoccult done in the ER was negative


Patient has no new episodes of bleeding, hemoglobin today 7.5


Due to cardiac risk factors, cardiology not cleared for elective endoscopy, GI 

following





--GI bleed


Current Visit: Yes   Status: Acute   


GI evaluated the patient


Planning for endoscopy however


Cardiology did not clear for the procedure due to cardiac risk factors


Unless actively bleeding due to risk factors





--Acute liver injury /transaminitis


Current Visit: Yes   Status: Acute   


Plan to address problem: 


CT of the abdomen and pelvis did not reveal any acute abnormality.


Trending down.  -96, -to 74


GI following





--Hypertensive urgency


Current Visit: Yes   Status: Acute   


Moderate control , continue current antihypertensives 


As needed medications , closely monitor





-- Pulmonary edema


Current Visit: Yes   Status: Acute   


Plan to address problem: 


Pulmonary edema shown on CT scan of the abdomen and pelvis.


Patient had a dose of IV Lasix.


Will check echocardiogram.


We will place a consult to cardiology for evaluation.





--DVT prophylaxis


Current Visit: Yes   Status: Acute   


Plan to address problem: 


Patient placed on sequential compression device.





-- Full code status


Current Visit: Yes   Status: Acute   


Plan to address problem: 


Patient is full code.





Follow consultants recommendations








7/13/2021; patient received permacath placement


Hemodialysis per schedule, possible endoscopy tomorrow





7/14/2021; patient feels slightly better, GI waiting for


Cardiology clearance for endoscopy





7/15/2021; no new episodes of GI bleeding


HPI low stable 7.5 today


Due to multiple cardiac risk factors, cardiology did not


For elective endoscopy, unless life-threatening bleeding.





7/16/2021; patient feels better


No new episodes of GI bleeding, no endoscopy during this admission


Outpatient HD placement per nephrology prior to discharge











History


Interval history: 


I have seen and examined the patient at the bedside


Patient's chart and medications reviewed


No new events reported by nursing staff


Vital signs noted





Hospitalist Physical





- Constitutional


Vitals: 


                                        











Temp Pulse Resp BP Pulse Ox


 


 97.5 F L  74   16   141/83   93 


 


 07/16/21 07:45  07/16/21 09:43  07/16/21 07:45  07/16/21 09:43  07/16/21 09:40











General appearance: Present: no acute distress, cachectic, other (Patient is 

lethargic, frail and chronically ill-appearing)





- EENT


Eyes: Present: PERRL, EOM intact





- Neck


Neck: Present: supple, normal ROM





- Respiratory


Respiratory effort: normal


Respiratory: bilateral: diminished, negative: rales, rhonchi, wheezing





- Cardiovascular


Rhythm: regular


Heart Sounds: Present: S1 & S2





- Extremities


Extremities: no ischemia, No edema





- Abdominal


General gastrointestinal: soft, non-tender, non-distended, normal bowel sounds





- Integumentary


Integumentary: Present: clear, warm





- Psychiatric


Psychiatric: appropriate mood/affect, cooperative, other (Confused at times)





- Neurologic


Neurologic: moves all extremities





Results





- Labs


CBC & Chem 7: 


                                 07/15/21 04:31





                                 07/15/21 04:31


Labs: 


                             Laboratory Last Values











WBC  7.6 K/mm3 (4.5-11.0)   07/15/21  04:31    


 


RBC  3.52 M/mm3 (3.65-5.03)  L  07/15/21  04:31    


 


Hgb  7.5 gm/dl (11.8-15.2)  L  07/15/21  04:31    


 


Hct  23.5 % (35.5-45.6)  L  07/15/21  04:31    


 


MCV  67 fl (84-94)  L  07/15/21  04:31    


 


MCH  21 pg (28-32)  L  07/15/21  04:31    


 


MCHC  32 % (32-34)   07/15/21  04:31    


 


RDW  18.3 % (13.2-15.2)  H  07/15/21  04:31    


 


Plt Count  123 K/mm3 (140-440)  L  07/15/21  04:31    


 


Lymph % (Auto)  15.8 % (13.4-35.0)   07/15/21  04:31    


 


Mono % (Auto)  13.8 % (0.0-7.3)  H  07/15/21  04:31    


 


Eos % (Auto)  1.2 % (0.0-4.3)   07/15/21  04:31    


 


Baso % (Auto)  0.4 % (0.0-1.8)   07/15/21  04:31    


 


Lymph # (Auto)  1.2 K/mm3 (1.2-5.4)   07/15/21  04:31    


 


Mono # (Auto)  1.0 K/mm3 (0.0-0.8)  H  07/15/21  04:31    


 


Eos # (Auto)  0.1 K/mm3 (0.0-0.4)   07/15/21  04:31    


 


Baso # (Auto)  0.0 K/mm3 (0.0-0.1)   07/15/21  04:31    


 


Add Manual Diff  Complete   07/12/21  04:25    


 


Total Counted  100   07/12/21  04:25    


 


Seg Neutrophils %  68.8 % (40.0-70.0)   07/15/21  04:31    


 


Seg Neuts % (Manual)  87.0 % (40.0-70.0)  H  07/12/21  04:25    


 


Band Neutrophils %  1.0 %  07/12/21  04:25    


 


Lymphocytes % (Manual)  5.0 % (13.4-35.0)  L  07/12/21  04:25    


 


Monocytes % (Manual)  7.0 % (0.0-7.3)   07/12/21  04:25    


 


Nucleated RBC %  1.0 % (0.0-0.9)  H  07/12/21  04:25    


 


Seg Neutrophils #  5.3 K/mm3 (1.8-7.7)   07/15/21  04:31    


 


Seg Neutrophils # Man  7.0 K/mm3 (1.8-7.7)   07/12/21  04:25    


 


Band Neutrophils #  0.1 K/mm3  07/12/21  04:25    


 


Lymphocytes # (Manual)  0.4 K/mm3 (1.2-5.4)  L  07/12/21  04:25    


 


Abs React Lymphs (Man)  0.0 K/mm3  07/12/21  04:25    


 


Monocytes # (Manual)  0.6 K/mm3 (0.0-0.8)   07/12/21  04:25    


 


Eosinophils # (Manual)  0.0 K/mm3 (0.0-0.4)   07/12/21  04:25    


 


Basophils # (Manual)  0.0 K/mm3 (0.0-0.1)   07/12/21  04:25    


 


Metamyelocytes #  0.0 K/mm3  07/12/21  04:25    


 


Myelocytes #  0.0 K/mm3  07/12/21  04:25    


 


Promyelocytes #  0.0 K/mm3  07/12/21  04:25    


 


Blast Cells #  0.0 K/mm3  07/12/21  04:25    


 


WBC Morphology  Not Reportable   07/12/21  04:25    


 


Hypersegmented Neuts  Not Reportable   07/12/21  04:25    


 


Hyposegmented Neuts  Not Reportable   07/12/21  04:25    


 


Hypogranular Neuts  Not Reportable   07/12/21  04:25    


 


Smudge Cells  Not Reportable   07/12/21  04:25    


 


Toxic Granulation  Not Reportable   07/12/21  04:25    


 


Toxic Vacuolation  Not Reportable   07/12/21  04:25    


 


Dohle Bodies  Not Reportable   07/12/21  04:25    


 


Pelger-Huet Anomaly  Not Reportable   07/12/21  04:25    


 


Curt Rods  Not Reportable   07/12/21  04:25    


 


Platelet Estimate  Consistent w auto   07/12/21  04:25    


 


Clumped Platelets  Not Reportable   07/12/21  04:25    


 


Plt Clumps, EDTA  Not Reportable   07/12/21  04:25    


 


Large Platelets  Not Reportable   07/12/21  04:25    


 


Giant Platelets  Not Reportable   07/12/21  04:25    


 


Platelet Satelliting  Not Reportable   07/12/21  04:25    


 


Plt Morphology Comment  Not Reportable   07/12/21  04:25    


 


RBC Morphology  Not Reportable   07/12/21  04:25    


 


Dimorphic RBCs  Not Reportable   07/12/21  04:25    


 


Polychromasia  Few   07/12/21  04:25    


 


Hypochromasia  2+   07/12/21  04:25    


 


Poikilocytosis  Not Reportable   07/12/21  04:25    


 


Anisocytosis  2+   07/12/21  04:25    


 


Microcytosis  Not Reportable   07/12/21  04:25    


 


Macrocytosis  Not Reportable   07/12/21  04:25    


 


Spherocytes  Not Reportable   07/12/21  04:25    


 


Pappenheimer Bodies  Not Reportable   07/12/21  04:25    


 


Sickle Cells  Not Reportable   07/12/21  04:25    


 


Target Cells  Not Reportable   07/12/21  04:25    


 


Tear Drop Cells  Not Reportable   07/12/21  04:25    


 


Ovalocytes  Not Reportable   07/12/21  04:25    


 


Helmet Cells  Not Reportable   07/12/21  04:25    


 


Negrete-Brenham Bodies  Not Reportable   07/12/21  04:25    


 


Cabot Rings  Not Reportable   07/12/21  04:25    


 


Michelet Cells  Not Reportable   07/12/21  04:25    


 


Bite Cells  Not Reportable   07/12/21  04:25    


 


Crenated Cell  Not Reportable   07/12/21  04:25    


 


Elliptocytes  Not Reportable   07/12/21  04:25    


 


Acanthocytes (Spur)  Not Reportable   07/12/21  04:25    


 


Rouleaux  Not Reportable   07/12/21  04:25    


 


Hemoglobin C Crystals  Not Reportable   07/12/21  04:25    


 


Schistocytes  1+   07/12/21  04:25    


 


Malaria parasites  Not Reportable   07/12/21  04:25    


 


Bryan Bodies  Not Reportable   07/12/21  04:25    


 


Hem Pathologist Commnt  No   07/12/21  04:25    


 


PT  18.4 Sec. (12.2-14.9)  H  07/13/21  19:20    


 


INR  1.48  (0.87-1.13)  H  07/13/21  19:20    


 


APTT  33.8 Sec. (24.2-36.6)   07/13/21  19:20    


 


ABG pH  7.384 pH Units (7.350-7.450)   07/11/21  14:40    


 


ABG pCO2  32.3 mm Hg  07/11/21  14:40    


 


ABG pO2  107.8 mm Hg (80.0-90.0)  H  07/11/21  14:40    


 


ABG HCO3  18.8 mmol/L (20.0-26.0)  L  07/11/21  14:40    


 


ABG O2 Saturation  97.9 % (95.0-99.0)   07/11/21  14:40    


 


ABG O2 Content  10.3  (0.0-44)   07/11/21  14:40    


 


ABG Base Excess  -5.6 mmol/L (-2.0-3.0)  L  07/11/21  14:40    


 


ABG Hemoglobin  7.4 gm/dl (14.0-18.0)  L  07/11/21  14:40    


 


ABG Carboxyhemoglobin  1.5 % (0.0-5.0)   07/11/21  14:40    


 


ABG Methemoglobin  0.5 % (0.0-1.5)   07/11/21  14:40    


 


Oxyhemoglobin  95.9 % (95.0-99.0)   07/11/21  14:40    


 


FiO2  28 %  07/11/21  14:40    


 


Sodium  136 mmol/L (137-145)  L  07/15/21  04:31    


 


Potassium  4.0 mmol/L (3.6-5.0)   07/15/21  04:31    


 


Chloride  97.0 mmol/L ()  L  07/15/21  04:31    


 


Carbon Dioxide  29 mmol/L (22-30)   07/15/21  04:31    


 


Anion Gap  14 mmol/L  07/15/21  04:31    


 


BUN  59 mg/dL (9-20)  H  07/15/21  04:31    


 


Creatinine  6.4 mg/dL (0.8-1.3)  H  07/15/21  04:31    


 


Estimated GFR  8 ml/min  07/15/21  04:31    


 


BUN/Creatinine Ratio  9 %  07/15/21  04:31    


 


Glucose  121 mg/dL ()  H  07/15/21  04:31    


 


POC Glucose  110 mg/dL ()  H  07/16/21  08:20    


 


Hemoglobin A1c  5.3 % (4-6)   07/11/21  02:00    


 


Calcium  8.1 mg/dL (8.4-10.2)  L  07/15/21  04:31    


 


Phosphorus  6.50 mg/dL (2.5-4.5)  H D 07/13/21  05:32    


 


Magnesium  2.30 mg/dL (1.7-2.3)   07/13/21  05:32    


 


Total Bilirubin  0.60 mg/dL (0.1-1.2)   07/15/21  04:31    


 


AST  96 units/L (5-40)  H  07/15/21  04:31    


 


ALT  274 units/L (7-56)  H  07/15/21  04:31    


 


Alkaline Phosphatase  92 units/L ()   07/15/21  04:31    


 


Total Protein  5.1 g/dL (6.3-8.2)  L  07/15/21  04:31    


 


Albumin  3.0 g/dL (3.9-5)  L  07/15/21  04:31    


 


Albumin/Globulin Ratio  1.4 %  07/15/21  04:31    


 


Lipase  51 units/L (13-60)   07/10/21  22:43    


 


PTH Intact  176.3 pg/mL (15-65)  H  07/12/21  04:25    


 


Urine Color  Yellow  (Yellow)   07/10/21  Unknown


 


Urine Turbidity  Cloudy  (Clear)   07/10/21  Unknown


 


Urine pH  5.0  (5.0-7.0)   07/10/21  Unknown


 


Ur Specific Gravity  1.018  (1.003-1.030)   07/10/21  Unknown


 


Urine Protein  >500 mg/dL (Negative)   07/10/21  Unknown


 


Urine Glucose (UA)  50 mg/dL (Negative)   07/10/21  Unknown


 


Urine Ketones  Neg mg/dL (Negative)   07/10/21  Unknown


 


Urine Blood  Mod  (Negative)   07/10/21  Unknown


 


Urine Nitrite  Neg  (Negative)   07/10/21  Unknown


 


Urine Bilirubin  Neg  (Negative)   07/10/21  Unknown


 


Urine Urobilinogen  < 2.0 mg/dL (<2.0)   07/10/21  Unknown


 


Ur Leukocyte Esterase  Neg  (Negative)   07/10/21  Unknown


 


Urine WBC (Auto)  4.0 /HPF (0.0-6.0)   07/10/21  Unknown


 


Urine RBC (Auto)  10.0 /HPF (0.0-6.0)   07/10/21  Unknown


 


U Epithel Cells (Auto)  1.0 /HPF (0-13.0)   07/10/21  Unknown


 


Urine Mucus  Few /HPF  07/10/21  Unknown


 


Urine Creatinine  78.5 mg/dL (0.1-20.0)  H  07/11/21  15:40    


 


Protein/Creatinin Ratio  9.49   07/11/21  15:40    


 


Urine Sodium  76 mmol/L  07/11/21  15:40    


 


Urine Total Protein  745 mg/dL (5-11.8)  H  07/11/21  15:40    


 


Hepatitis A IgM Ab  Non-reactive  (NonReactive)   07/11/21  00:00    


 


Hep Bs Antigen  Non-reactive  (Negative)   07/11/21  00:00    


 


Hep B Core IgM Ab  Non-reactive  (NonReactive)   07/11/21  00:00    


 


Hepatitis C Antibody  Non-reactive  (NonReactive)   07/11/21  00:00    


 


Blood Type  O POSITIVE   07/12/21  10:53    


 


Antibody Screen  Negative   07/12/21  10:53    


 


Crossmatch  See Detail   07/12/21  10:53    











Malave/IV: 


                                        





Voiding Method                   Bedside Commode











Active Medications





- Current Medications


Current Medications: 














Generic Name Dose Route Start Last Admin





  Trade Name Freq  PRN Reason Stop Dose Admin


 


Acetaminophen  650 mg  07/11/21 02:17 





  Acetaminophen 325 Mg Tab  PO  





  Q6H PRN  





  Pain MILD(1-3)/Fever >100.5/HA  


 


Amlodipine Besylate  10 mg  07/12/21 13:00  07/16/21 09:43





  Amlodipine 10 Mg Tab  PO   10 mg





  QDAY SOTERO   Administration


 


Aspirin  81 mg  07/12/21 10:00  07/16/21 09:42





  Aspirin Ec 81 Mg Tab  PO   81 mg





  QDAY SOTERO   Administration


 


Calcium Acetate  667 mg  07/13/21 12:00  07/16/21 09:42





  Calcium Acetate 667 Mg Cap  PO   667 mg





  TIDWM SOTERO   Administration


 


Carvedilol  12.5 mg  07/11/21 22:00  07/16/21 09:42





  Carvedilol 12.5 Mg Tab  PO   12.5 mg





  BID SOTERO   Administration


 


Dextrose  0 ml  07/11/21 02:17 





  Dextrose 50% In Water (25gm) 50 Ml Syringe  IV  





  Q30MIN PRN  





  Hypoglycemia  





  Protocol  


 


Furosemide  80 mg  07/14/21 10:00  07/16/21 09:43





  Furosemide 40 Mg Tab  PO   80 mg





  QDAY SOTERO   Administration


 


Hydralazine HCl  10 mg  07/11/21 06:13  07/11/21 10:10





  Hydralazine 20 Mg/1 Ml Inj  IV   10 mg





  Q4H PRN   Administration





  Blood Pressure  


 


Sodium Chloride  100 mls @ 999 mls/hr  07/12/21 10:00 





  Nacl 0.9%  IV  





  LIBIA PRN  





  Hypotension  


 


Insulin Human Regular  0 units  07/11/21 07:30  07/16/21 08:22





  Insulin Regular, Human 100 Units/1 Ml  SUB-Q   Not Given





  ACHS SOTERO  





  Protocol  


 


Isosorbide Mononitrate  30 mg  07/14/21 15:00  07/16/21 09:42





  Isosorbide Mononitrate Er 30 Mg Tab  PO   30 mg





  QDAY SOTERO   Administration


 


Magnesium Hydroxide  30 ml  07/11/21 02:17 





  Magnesium Hydroxide (Mom) Oral Liqd Udc  PO  





  Q4H PRN  





  Constipation  


 


Nitroglycerin  0.4 mg  07/13/21 06:00  07/16/21 06:06





  Nitroglycerin 0.4 Mg Patch 24hr  TD   0.4 mg





  QDAY@0600 SOTERO   Administration


 


Ondansetron HCl  4 mg  07/11/21 02:17  07/13/21 16:38





  Ondansetron 4 Mg/2 Ml Inj  IV   4 mg





  Q8H PRN   Administration





  Nausea And Vomiting  


 


Pantoprazole Sodium  40 mg  07/11/21 10:00  07/16/21 09:43





  Pantoprazole 40 Mg Inj  IV   40 mg





  BID SOTERO   Administration


 


Sodium Chloride  10 ml  07/11/21 10:00  07/16/21 09:43





  Sodium Chloride 0.9% 10 Ml Flush Syringe  IV   10 ml





  BID SOTERO   Administration


 


Sodium Chloride  10 ml  07/11/21 02:17 





  Sodium Chloride 0.9% 10 Ml Flush Syringe  IV  





  PRN PRN  





  LINE FLUSH  














Nutrition/Malnutrition Assess





- Dietary Evaluation


Nutrition/Malnutrition Findings: 


                                        





Nutrition Notes                                            Start:  07/11/21 

12:02


Freq:                                                      Status: Active       




Protocol:                                                                       




 Document     07/14/21 14:52    (Rec: 07/14/21 14:56    [id number])


 Nutrition Notes


     Initial or Follow up                        Reassessment


     Current Diagnosis                           Acute Kidney Injury,Diabetes,


                                                 Hypertension,Heart Failure,


                                                 Hyperlipidemia


     Other Pertinent Diagnosis                   elevated LFTs, GIB, anemia


     Current Diet                                Cardiac, Consistent CHO


     Labs/Tests                                  Reviewed


     Pertinent Medications                       Lasix


                                                 PhosLo


     Height                                      5 ft 5 in


     Weight                                      46.2 kg


     Ideal Body Weight (kg)                      61.81


     BMI                                         16.9


     Weight change and time frame                Wt change noted, will follow


                                                 trends


     Weight Status                               Underweight


     Subjective/Other Information                FU for intakes. Pt eating <50%


                                                 of meals. He does not think


                                                 he has lost any weight and is


                                                 unsure of UBW. Pt did not get


                                                 ONS due to new diet order. Due


                                                 to advanced age, pt not


                                                 appropriate for diet education


                                                 .


     Percent of energy/protein needs met:        71%/69%


     Burn                                        Absent


     Trauma                                      Absent


     GI Symptoms                                 None


     Current % PO                                Poor (25-49%)


     Minimum of two criteria                     Yes


     Energy Intake (non-severe)                  <75% Estimated Energy


                                                 Requirement >7 days


     Muscle Mass                                 Mild Depletion (non-severe)


     #1


      Nutrition Diagnosis                        Malnutrition


      Diagnosis Progress(for reassessment        Continues


       documentation)                            


     Is patient on ventilator?                   No


     Is Patient Ambulatory and/or Out of Bed     No


     REE-([facility]-confined to bed)      [phone]


     Calculation Used for Recommendations        [facility]


     Additional Notes                            Protein: (1.2-1.5g/kg) 62-77g


                                                 Fluid: 1 ml/kcal or per MD


 Nutrition Intervention


     Change Diet Order:                          Continue


     Add Supplement/Snack (indicate name/kcal    Nepro BID


      /protein )                                 


     Provides kCal:                              850


     Provides Protein (gm)                       38


     Goal #1                                     Meet at least 80% of protein


                                                 and energy needs via PO and


                                                 ONS intakes


     Goal #2                                     Weight gain/maintenance


     Anticipated Discharge Needs:                Cardiac, Consistent CHO


     Follow-Up By:                               07/16/21


     Additional Comments                         FU for intakes, ONS tolerance

## 2021-07-17 LAB
BUN SERPL-MCNC: 62 MG/DL (ref 9–20)
BUN/CREAT SERPL: 10 %
CALCIUM SERPL-MCNC: 7.9 MG/DL (ref 8.4–10.2)
HEMOLYSIS INDEX: 5

## 2021-07-17 PROCEDURE — 5A1D70Z PERFORMANCE OF URINARY FILTRATION, INTERMITTENT, LESS THAN 6 HOURS PER DAY: ICD-10-PCS | Performed by: INTERNAL MEDICINE

## 2021-07-17 RX ADMIN — PANTOPRAZOLE SODIUM SCH MG: 40 TABLET, DELAYED RELEASE ORAL at 22:04

## 2021-07-17 RX ADMIN — INSULIN HUMAN SCH: 100 INJECTION, SOLUTION PARENTERAL at 16:22

## 2021-07-17 RX ADMIN — PANTOPRAZOLE SODIUM SCH MG: 40 INJECTION, POWDER, FOR SOLUTION INTRAVENOUS at 12:57

## 2021-07-17 RX ADMIN — FUROSEMIDE SCH MG: 40 TABLET ORAL at 12:57

## 2021-07-17 RX ADMIN — INSULIN HUMAN SCH: 100 INJECTION, SOLUTION PARENTERAL at 22:15

## 2021-07-17 RX ADMIN — ASPIRIN SCH MG: 81 TABLET, COATED ORAL at 12:57

## 2021-07-17 RX ADMIN — INSULIN HUMAN SCH UNITS: 100 INJECTION, SOLUTION PARENTERAL at 22:05

## 2021-07-17 RX ADMIN — EPOETIN ALFA-EPBX PRN UNIT: 20000 INJECTION, SOLUTION INTRAVENOUS; SUBCUTANEOUS at 17:15

## 2021-07-17 RX ADMIN — CALCIUM ACETATE SCH MG: 667 CAPSULE ORAL at 22:04

## 2021-07-17 RX ADMIN — INSULIN HUMAN SCH: 100 INJECTION, SOLUTION PARENTERAL at 09:35

## 2021-07-17 RX ADMIN — CALCIUM ACETATE SCH MG: 667 CAPSULE ORAL at 12:57

## 2021-07-17 RX ADMIN — CALCIUM ACETATE SCH: 667 CAPSULE ORAL at 12:56

## 2021-07-17 RX ADMIN — Medication SCH ML: at 22:11

## 2021-07-17 RX ADMIN — Medication SCH ML: at 12:56

## 2021-07-17 RX ADMIN — NITROGLYCERIN TRANSDERMAL SYSTEM SCH MG: 0.4 PATCH, EXTENDED RELEASE TRANSDERMAL at 05:38

## 2021-07-17 NOTE — PROGRESS NOTE
Assessment and Plan


1. Acute kidney injury superimposed on CKD:


EDE superimposed on CKD .


CT abdomen negative for hydro.


Monitor renal function.


Renal prognosis is guarded to poor.


Avoid nephrotoxic agents.


Meds dosage based on GFR.


Patient was started on hemodialysis due to significant decline in the GFR, 

associated hyperkalemia, metabolic acidosis and volume overload.


Hemodialysis: 7/12, 7/13, 7/15.





2. FEN:


Hyperkalemia, improved, monitor.


Anion-gap Metabolic acidosis, HD today, monitor.


Volume overload, UF with HD.


Monitor lytes.





3. Heavy proteinuria:


?etiology.


STEVE, ANCA, GBM Ab, Complements, UPEP and SPEP ordered.





4. Decompensated CHF:


Echo EF 45-50%.


Strict I/O.


On Carvedilol.


Followed by Cards.





5. Elevated liver enzymes:


Hepatitis serologies negative.





6. GI bleed:


Per GI EGD after clearance from Cards.





7. Hypertensive urgency:


BP controlled.


UF with HD as tolerated.


Adjust meds as needed.


Monitor.





8. Hypochromic Anemia, POA:


?2/2 GI bleed.


Followed by GI.


Epogen.


Monitor.





9. Thrombocytopenia.





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





Await outpatient hemodialysis chair.











Subjective:


Patient was seen and examined at the bedside.











Examination:


General appearance: well-developed, thin built, appears stated age, not in 

distress


HEENT: ATNC, pupils equal


Neck: supple


Respiratory: bibasal diminished breath sounds


Cardiology: regular, S1S2, no murmur


Gastrointestinal: soft, bowel sounds heard, not tender


Integumentary: warm and dry, upper chest hypopigmented patches noted


Neurologic: alert, moving extremities


Ext: no edema


Hemodialysis access: L Tunnel catheter








Subjective


Date of service: 07/17/21


Principal diagnosis: Anemia, Abnormal LFTs





Objective





- Vital Signs


Vital signs: 


                               Vital Signs - 12hr











  07/16/21 07/17/21 07/17/21





  22:15 00:31 06:25


 


Temperature  97.5 F L 97.3 F L


 


Pulse Rate  73 75


 


Respiratory  17 16





Rate   


 


Blood Pressure  117/67 122/71


 


O2 Sat by Pulse 97 94 93





Oximetry   














- Lab





                                 07/15/21 04:31





                                 07/17/21 06:10


                             Most recent lab results











ABG pH  7.384 pH Units (7.350-7.450)   07/11/21  14:40    


 


ABG pCO2  32.3 mm Hg  07/11/21  14:40    


 


ABG pO2  107.8 mm Hg (80.0-90.0)  H  07/11/21  14:40    


 


ABG HCO3  18.8 mmol/L (20.0-26.0)  L  07/11/21  14:40    


 


ABG O2 Saturation  97.9 % (95.0-99.0)   07/11/21  14:40    


 


Calcium  7.9 mg/dL (8.4-10.2)  L  07/17/21  06:10    


 


Phosphorus  2.60 mg/dL (2.5-4.5)   07/17/21  06:10    


 


Magnesium  2.30 mg/dL (1.7-2.3)   07/13/21  05:32    


 


Urine Creatinine  78.5 mg/dL (0.1-20.0)  H  07/11/21  15:40    


 


Urine Sodium  76 mmol/L  07/11/21  15:40    


 


Urine Total Protein  745 mg/dL (5-11.8)  H  07/11/21  15:40    














Medications & Allergies





- Medications


Allergies/Adverse Reactions: 


                                    Allergies





No Known Allergies Allergy (Unverified 01/17/15 01:13)


   








Home Medications: 


                                Home Medications











 Medication  Instructions  Recorded  Confirmed  Last Taken  Type


 


Amlodipine Besylate [Norvasc] 10 mg PO QDAY 07/10/21 07/10/21 Unknown History


 


Aspirin EC [Halfprin EC] 81 mg PO QDAY 07/10/21 07/10/21 Unknown History


 


Atorvastatin Calcium [Lipitor] 80 mg PO QDAY 07/10/21 07/10/21 Unknown History


 


Lisinopril [Zestril] 10 mg PO QDAY 07/10/21 07/10/21 Unknown History











Active Medications: 














Generic Name Dose Route Start Last Admin





  Trade Name Freq  PRN Reason Stop Dose Admin


 


Acetaminophen  650 mg  07/11/21 02:17 





  Acetaminophen 325 Mg Tab  PO  





  Q6H PRN  





  Pain MILD(1-3)/Fever >100.5/HA  


 


Amlodipine Besylate  10 mg  07/12/21 13:00  07/16/21 09:43





  Amlodipine 10 Mg Tab  PO   10 mg





  QDAY SOTERO   Administration


 


Aspirin  81 mg  07/12/21 10:00  07/16/21 09:42





  Aspirin Ec 81 Mg Tab  PO   81 mg





  QDAY SOTERO   Administration


 


Calcium Acetate  667 mg  07/13/21 12:00  07/16/21 16:40





  Calcium Acetate 667 Mg Cap  PO   667 mg





  TIDWM SOTERO   Administration


 


Carvedilol  12.5 mg  07/11/21 22:00  07/16/21 22:23





  Carvedilol 12.5 Mg Tab  PO   12.5 mg





  BID SOTERO   Administration


 


Dextrose  0 ml  07/11/21 02:17 





  Dextrose 50% In Water (25gm) 50 Ml Syringe  IV  





  Q30MIN PRN  





  Hypoglycemia  





  Protocol  


 


Furosemide  80 mg  07/14/21 10:00  07/16/21 09:43





  Furosemide 40 Mg Tab  PO   80 mg





  QDAY SOTERO   Administration


 


Hydralazine HCl  10 mg  07/11/21 06:13  07/11/21 10:10





  Hydralazine 20 Mg/1 Ml Inj  IV   10 mg





  Q4H PRN   Administration





  Blood Pressure  


 


Sodium Chloride  100 mls @ 999 mls/hr  07/12/21 10:00 





  Nacl 0.9%  IV  





  LIBIA PRN  





  Hypotension  


 


Insulin Human Regular  0 units  07/11/21 07:30  07/17/21 09:35





  Insulin Regular, Human 100 Units/1 Ml  SUB-Q   Not Given





  [facility]  





  Protocol  


 


Isosorbide Mononitrate  30 mg  07/14/21 15:00  07/16/21 09:42





  Isosorbide Mononitrate Er 30 Mg Tab  PO   30 mg





  QDAY SOTERO   Administration


 


Magnesium Hydroxide  30 ml  07/11/21 02:17 





  Magnesium Hydroxide (Mom) Oral Liqd Udc  PO  





  Q4H PRN  





  Constipation  


 


Nitroglycerin  0.4 mg  07/13/21 06:00  07/17/21 05:38





  Nitroglycerin 0.4 Mg Patch 24hr  TD   0.4 mg





  QDAY@0600 SOTERO   Administration


 


Ondansetron HCl  4 mg  07/11/21 02:17  07/13/21 16:38





  Ondansetron 4 Mg/2 Ml Inj  IV   4 mg





  Q8H PRN   Administration





  Nausea And Vomiting  


 


Pantoprazole Sodium  40 mg  07/11/21 10:00  07/16/21 22:23





  Pantoprazole 40 Mg Inj  IV   40 mg





  BID SOTERO   Administration


 


Sodium Chloride  10 ml  07/11/21 10:00  07/16/21 22:23





  Sodium Chloride 0.9% 10 Ml Flush Syringe  IV   10 ml





  BID SOTERO   Administration


 


Sodium Chloride  10 ml  07/11/21 02:17 





  Sodium Chloride 0.9% 10 Ml Flush Syringe  IV  





  PRN PRN  





  LINE FLUSH

## 2021-07-17 NOTE — PROGRESS NOTE
Assessment and Plan





- Patient Problems


(1) Abnormal ECG


Current Visit: Yes   Status: Acute   


Plan to address problem: 


Patient has marked ST and T wave abnormalities in the anterolateral leads, more 

pronounced on the recent ECGs that his previous baseline.  We will continue 

routine daily ECGs for continued assessment.





He has no history of significant coronary artery disease.  Instead, 2 years ago 

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








(2) Preoperative cardiovascular examination


Current Visit: Yes   Status: Acute   





Subjective


Date of service: 07/17/21


Principal diagnosis: Anemia, Abnormal LFTs


Interval history: 





Patient is comfortable, no new cardiac complaints, no acute distress.





Objective


                                   Vital Signs











  Temp Pulse Resp BP Pulse Ox


 


 07/17/21 10:00      95


 


 07/17/21 06:25  97.3 F L  75  16  122/71  93


 


 07/17/21 00:31  97.5 F L  73  17  117/67  94


 


 07/16/21 22:15      97


 


 07/16/21 20:00   75   


 


 07/16/21 19:58  98.0 F  72  17  114/65  96


 


 07/16/21 16:49  98.9 F  75  18  122/70  97














- Physical Examination


General: No Apparent Distress


HEENT: Positive: PERRL


Neck: Positive: neck supple


Cardiac: Positive: Reg Rate and Rhythm


Lungs: Positive: Decreased Breath Sounds


Neuro: Positive: Weakness (Generalized weakness, frail and chronically ill appe

aring)


Abdomen: Positive: Soft


Skin: Positive: Clear


Extremities: Absent: normal





- Labs and Meds


                          Comprehensive Metabolic Panel











  07/17/21 Range/Units





  06:10 


 


Sodium  135 L  (137-145)  mmol/L


 


Potassium  3.6  (3.6-5.0)  mmol/L


 


Chloride  97.8 L  ()  mmol/L


 


Carbon Dioxide  26  (22-30)  mmol/L


 


BUN  62 H  (9-20)  mg/dL


 


Creatinine  6.0 H  (0.8-1.3)  mg/dL


 


Glucose  117 H  ()  mg/dL


 


Calcium  7.9 L  (8.4-10.2)  mg/dL














- Allied health notes


Allied health notes reviewed: nursing

## 2021-07-17 NOTE — PROGRESS NOTE
Assessment and Plan


Assessment and plan: 


--Acute kidney injury superimposed on chronic kidney disease


Current Visit: Yes   Status: Acute   


Patient  with chronic kidney disease.  Worsening renal function


s/p Permacath placement, hemodialysis 7/12, 7/13


Nephrology following, outpatient HD chair placement at discharge per renal





-- Hyperkalemia/present on admission


Current Visit: Yes   Status: Acute   


Patient is received insulin and glucose, calcium gluconate sodium bicarb while 

in the  emergency room


Normal potassium levels, monitor electrolytes





-- Anemia


Current Visit: Yes   Status: Acute   


Possibly from GI bleed.


Received 1 unit of PRBC transfusion, Hb today 7.9


Stool Hemoccult done in the ER was negative


Patient has no new episodes of bleeding, hemoglobin today 7.5


Due to cardiac risk factors, cardiology not cleared for elective endoscopy, GI 

following





--GI bleed


Current Visit: Yes   Status: Acute   


GI evaluated the patient


Planning for endoscopy however


Cardiology did not clear for the procedure due to cardiac risk factors


Unless actively bleeding due to risk factors





--Acute liver injury /transaminitis


Current Visit: Yes   Status: Acute   


Plan to address problem: 


CT of the abdomen and pelvis did not reveal any acute abnormality.


Trending down.  -96, -to 74


GI following





--Hypertensive urgency


Current Visit: Yes   Status: Acute   


Moderate control , continue current antihypertensives 


As needed medications , closely monitor





-- Pulmonary edema


Current Visit: Yes   Status: Acute   


Plan to address problem: 


Pulmonary edema shown on CT scan of the abdomen and pelvis.


Patient had a dose of IV Lasix.


Will check echocardiogram.


We will place a consult to cardiology for evaluation.





--DVT prophylaxis


Current Visit: Yes   Status: Acute   


Plan to address problem: 


Patient placed on sequential compression device.





-- Full code status


Current Visit: Yes   Status: Acute   


Plan to address problem: 


Patient is full code.





Follow consultants recommendations








7/13/2021; patient received permacath placement


Hemodialysis per schedule, possible endoscopy tomorrow





7/14/2021; patient feels slightly better, GI waiting for


Cardiology clearance for endoscopy





7/15/2021; no new episodes of GI bleeding


HPI low stable 7.5 today


Due to multiple cardiac risk factors, cardiology did not


For elective endoscopy, unless life-threatening bleeding.





7/16/2021; patient feels better


No new episodes of GI bleeding, no endoscopy during this admission


Outpatient HD placement per nephrology prior to discharge





7/17/2021; awaiting outpatient HD placement


I spoke with patient's daughter and granddaughter at the bedside


Answered all their questions, they were anxious to take him home


Informed them that outpatient HD placement should be scheduled by case mel florez


Prior to discharge, they verbalized understanding














History


Interval history: 


I have seen and examined the patient at the bedside


Patient's chart and medications reviewed


Patient feels slightly better


Awaiting of patient HD placement


Vital signs noted








Hospitalist Physical





- Constitutional


Vitals: 


                                        











Temp Pulse Resp BP Pulse Ox


 


 97.3 F L  75   16   122/71   93 


 


 07/17/21 06:25  07/17/21 06:25  07/17/21 06:25  07/17/21 06:25  07/17/21 06:25











General appearance: Present: no acute distress, cachectic, other (Patient is 

lethargic, frail and chronically ill-appearing)





- EENT


Eyes: Present: PERRL, EOM intact





- Neck


Neck: Present: supple, normal ROM





- Respiratory


Respiratory effort: normal


Respiratory: bilateral: diminished, negative: rales, rhonchi, wheezing





- Cardiovascular


Rhythm: regular


Heart Sounds: Present: S1 & S2





- Extremities


Extremities: no ischemia, No edema





- Abdominal


General gastrointestinal: soft, non-tender, non-distended, normal bowel sounds





- Integumentary


Integumentary: Present: clear, warm





- Psychiatric


Psychiatric: appropriate mood/affect, cooperative





- Neurologic


Neurologic: CNII-XII intact, moves all extremities





Results





- Labs


CBC & Chem 7: 


                                 07/15/21 04:31





                                 07/17/21 06:10


Labs: 


                             Laboratory Last Values











WBC  7.6 K/mm3 (4.5-11.0)   07/15/21  04:31    


 


RBC  3.52 M/mm3 (3.65-5.03)  L  07/15/21  04:31    


 


Hgb  7.5 gm/dl (11.8-15.2)  L  07/15/21  04:31    


 


Hct  23.5 % (35.5-45.6)  L  07/15/21  04:31    


 


MCV  67 fl (84-94)  L  07/15/21  04:31    


 


MCH  21 pg (28-32)  L  07/15/21  04:31    


 


MCHC  32 % (32-34)   07/15/21  04:31    


 


RDW  18.3 % (13.2-15.2)  H  07/15/21  04:31    


 


Plt Count  123 K/mm3 (140-440)  L  07/15/21  04:31    


 


Lymph % (Auto)  15.8 % (13.4-35.0)   07/15/21  04:31    


 


Mono % (Auto)  13.8 % (0.0-7.3)  H  07/15/21  04:31    


 


Eos % (Auto)  1.2 % (0.0-4.3)   07/15/21  04:31    


 


Baso % (Auto)  0.4 % (0.0-1.8)   07/15/21  04:31    


 


Lymph # (Auto)  1.2 K/mm3 (1.2-5.4)   07/15/21  04:31    


 


Mono # (Auto)  1.0 K/mm3 (0.0-0.8)  H  07/15/21  04:31    


 


Eos # (Auto)  0.1 K/mm3 (0.0-0.4)   07/15/21  04:31    


 


Baso # (Auto)  0.0 K/mm3 (0.0-0.1)   07/15/21  04:31    


 


Add Manual Diff  Complete   07/12/21  04:25    


 


Total Counted  100   07/12/21  04:25    


 


Seg Neutrophils %  68.8 % (40.0-70.0)   07/15/21  04:31    


 


Seg Neuts % (Manual)  87.0 % (40.0-70.0)  H  07/12/21  04:25    


 


Band Neutrophils %  1.0 %  07/12/21  04:25    


 


Lymphocytes % (Manual)  5.0 % (13.4-35.0)  L  07/12/21  04:25    


 


Monocytes % (Manual)  7.0 % (0.0-7.3)   07/12/21  04:25    


 


Nucleated RBC %  1.0 % (0.0-0.9)  H  07/12/21  04:25    


 


Seg Neutrophils #  5.3 K/mm3 (1.8-7.7)   07/15/21  04:31    


 


Seg Neutrophils # Man  7.0 K/mm3 (1.8-7.7)   07/12/21  04:25    


 


Band Neutrophils #  0.1 K/mm3  07/12/21  04:25    


 


Lymphocytes # (Manual)  0.4 K/mm3 (1.2-5.4)  L  07/12/21  04:25    


 


Abs React Lymphs (Man)  0.0 K/mm3  07/12/21  04:25    


 


Monocytes # (Manual)  0.6 K/mm3 (0.0-0.8)   07/12/21  04:25    


 


Eosinophils # (Manual)  0.0 K/mm3 (0.0-0.4)   07/12/21  04:25    


 


Basophils # (Manual)  0.0 K/mm3 (0.0-0.1)   07/12/21  04:25    


 


Metamyelocytes #  0.0 K/mm3  07/12/21  04:25    


 


Myelocytes #  0.0 K/mm3  07/12/21  04:25    


 


Promyelocytes #  0.0 K/mm3  07/12/21  04:25    


 


Blast Cells #  0.0 K/mm3  07/12/21  04:25    


 


WBC Morphology  Not Reportable   07/12/21  04:25    


 


Hypersegmented Neuts  Not Reportable   07/12/21  04:25    


 


Hyposegmented Neuts  Not Reportable   07/12/21  04:25    


 


Hypogranular Neuts  Not Reportable   07/12/21  04:25    


 


Smudge Cells  Not Reportable   07/12/21  04:25    


 


Toxic Granulation  Not Reportable   07/12/21  04:25    


 


Toxic Vacuolation  Not Reportable   07/12/21  04:25    


 


Dohle Bodies  Not Reportable   07/12/21  04:25    


 


Pelger-Huet Anomaly  Not Reportable   07/12/21  04:25    


 


Curt Rods  Not Reportable   07/12/21  04:25    


 


Platelet Estimate  Consistent w auto   07/12/21  04:25    


 


Clumped Platelets  Not Reportable   07/12/21  04:25    


 


Plt Clumps, EDTA  Not Reportable   07/12/21  04:25    


 


Large Platelets  Not Reportable   07/12/21  04:25    


 


Giant Platelets  Not Reportable   07/12/21  04:25    


 


Platelet Satelliting  Not Reportable   07/12/21  04:25    


 


Plt Morphology Comment  Not Reportable   07/12/21  04:25    


 


RBC Morphology  Not Reportable   07/12/21  04:25    


 


Dimorphic RBCs  Not Reportable   07/12/21  04:25    


 


Polychromasia  Few   07/12/21  04:25    


 


Hypochromasia  2+   07/12/21  04:25    


 


Poikilocytosis  Not Reportable   07/12/21  04:25    


 


Anisocytosis  2+   07/12/21  04:25    


 


Microcytosis  Not Reportable   07/12/21  04:25    


 


Macrocytosis  Not Reportable   07/12/21  04:25    


 


Spherocytes  Not Reportable   07/12/21  04:25    


 


Pappenheimer Bodies  Not Reportable   07/12/21  04:25    


 


Sickle Cells  Not Reportable   07/12/21  04:25    


 


Target Cells  Not Reportable   07/12/21  04:25    


 


Tear Drop Cells  Not Reportable   07/12/21  04:25    


 


Ovalocytes  Not Reportable   07/12/21  04:25    


 


Helmet Cells  Not Reportable   07/12/21  04:25    


 


[place] Bodies  Not Reportable   07/12/21  04:25    


 


Cabot Rings  Not Reportable   07/12/21  04:25    


 


[place] Cells  Not Reportable   07/12/21  04:25    


 


Bite Cells  Not Reportable   07/12/21  04:25    


 


Crenated Cell  Not Reportable   07/12/21  04:25    


 


Elliptocytes  Not Reportable   07/12/21  04:25    


 


Acanthocytes (Spur)  Not Reportable   07/12/21  04:25    


 


Rouleaux  Not Reportable   07/12/21  04:25    


 


Hemoglobin C Crystals  Not Reportable   07/12/21  04:25    


 


Schistocytes  1+   07/12/21  04:25    


 


Malaria parasites  Not Reportable   07/12/21  04:25    


 


Bryan Bodies  Not Reportable   07/12/21  04:25    


 


Hem Pathologist Commnt  No   07/12/21  04:25    


 


PT  18.4 Sec. (12.2-14.9)  H  07/13/21  19:20    


 


INR  1.48  (0.87-1.13)  H  07/13/21  19:20    


 


APTT  33.8 Sec. (24.2-36.6)   07/13/21  19:20    


 


ABG pH  7.384 pH Units (7.350-7.450)   07/11/21  14:40    


 


ABG pCO2  32.3 mm Hg  07/11/21  14:40    


 


ABG pO2  107.8 mm Hg (80.0-90.0)  H  07/11/21  14:40    


 


ABG HCO3  18.8 mmol/L (20.0-26.0)  L  07/11/21  14:40    


 


ABG O2 Saturation  97.9 % (95.0-99.0)   07/11/21  14:40    


 


ABG O2 Content  10.3  (0.0-44)   07/11/21  14:40    


 


ABG Base Excess  -5.6 mmol/L (-2.0-3.0)  L  07/11/21  14:40    


 


ABG Hemoglobin  7.4 gm/dl (14.0-18.0)  L  07/11/21  14:40    


 


ABG Carboxyhemoglobin  1.5 % (0.0-5.0)   07/11/21  14:40    


 


ABG Methemoglobin  0.5 % (0.0-1.5)   07/11/21  14:40    


 


Oxyhemoglobin  95.9 % (95.0-99.0)   07/11/21  14:40    


 


FiO2  28 %  07/11/21  14:40    


 


Sodium  135 mmol/L (137-145)  L  07/17/21  06:10    


 


Potassium  3.6 mmol/L (3.6-5.0)   07/17/21  06:10    


 


Chloride  97.8 mmol/L ()  L  07/17/21  06:10    


 


Carbon Dioxide  26 mmol/L (22-30)   07/17/21  06:10    


 


Anion Gap  15 mmol/L  07/17/21  06:10    


 


BUN  62 mg/dL (9-20)  H  07/17/21  06:10    


 


Creatinine  6.0 mg/dL (0.8-1.3)  H  07/17/21  06:10    


 


Estimated GFR  9 ml/min  07/17/21  06:10    


 


BUN/Creatinine Ratio  10 %  07/17/21  06:10    


 


Glucose  117 mg/dL ()  H  07/17/21  06:10    


 


POC Glucose  202 mg/dL ()  H  07/17/21  11:51    


 


Hemoglobin A1c  5.3 % (4-6)   07/11/21  02:00    


 


Calcium  7.9 mg/dL (8.4-10.2)  L  07/17/21  06:10    


 


Phosphorus  2.60 mg/dL (2.5-4.5)   07/17/21  06:10    


 


Magnesium  2.30 mg/dL (1.7-2.3)   07/13/21  05:32    


 


Total Bilirubin  0.60 mg/dL (0.1-1.2)   07/15/21  04:31    


 


AST  96 units/L (5-40)  H  07/15/21  04:31    


 


ALT  274 units/L (7-56)  H  07/15/21  04:31    


 


Alkaline Phosphatase  92 units/L ()   07/15/21  04:31    


 


Total Protein  5.1 g/dL (6.3-8.2)  L  07/15/21  04:31    


 


Albumin  3.0 g/dL (3.9-5)  L  07/15/21  04:31    


 


Albumin/Globulin Ratio  1.4 %  07/15/21  04:31    


 


Lipase  51 units/L (13-60)   07/10/21  22:43    


 


PTH Intact  176.3 pg/mL (15-65)  H  07/12/21  04:25    


 


Urine Color  Yellow  (Yellow)   07/10/21  Unknown


 


Urine Turbidity  Cloudy  (Clear)   07/10/21  Unknown


 


Urine pH  5.0  (5.0-7.0)   07/10/21  Unknown


 


Ur Specific Gravity  1.018  (1.003-1.030)   07/10/21  Unknown


 


Urine Protein  >500 mg/dL (Negative)   07/10/21  Unknown


 


Urine Glucose (UA)  50 mg/dL (Negative)   07/10/21  Unknown


 


Urine Ketones  Neg mg/dL (Negative)   07/10/21  Unknown


 


Urine Blood  Mod  (Negative)   07/10/21  Unknown


 


Urine Nitrite  Neg  (Negative)   07/10/21  Unknown


 


Urine Bilirubin  Neg  (Negative)   07/10/21  Unknown


 


Urine Urobilinogen  < 2.0 mg/dL (<2.0)   07/10/21  Unknown


 


Ur Leukocyte Esterase  Neg  (Negative)   07/10/21  Unknown


 


Urine WBC (Auto)  4.0 /HPF (0.0-6.0)   07/10/21  Unknown


 


Urine RBC (Auto)  10.0 /HPF (0.0-6.0)   07/10/21  Unknown


 


U Epithel Cells (Auto)  1.0 /HPF (0-13.0)   07/10/21  Unknown


 


Urine Mucus  Few /HPF  07/10/21  Unknown


 


Urine Creatinine  78.5 mg/dL (0.1-20.0)  H  07/11/21  15:40    


 


Protein/Creatinin Ratio  9.49   07/11/21  15:40    


 


Urine Sodium  76 mmol/L  07/11/21  15:40    


 


Urine Total Protein  745 mg/dL (5-11.8)  H  07/11/21  15:40    


 


Hepatitis A IgM Ab  Non-reactive  (NonReactive)   07/11/21  00:00    


 


Hep Bs Antigen  Non-reactive  (Negative)   07/11/21  00:00    


 


Hep B Core IgM Ab  Non-reactive  (NonReactive)   07/11/21  00:00    


 


Hepatitis C Antibody  Non-reactive  (NonReactive)   07/11/21  00:00    


 


Blood Type  O POSITIVE   07/12/21  10:53    


 


Antibody Screen  Negative   07/12/21  10:53    


 


Crossmatch  See Detail   07/12/21  10:53    











Malave/IV: 


                                        





Voiding Method                   Bedside Commode











Active Medications





- Current Medications


Current Medications: 














Generic Name Dose Route Start Last Admin





  Trade Name Freq  PRN Reason Stop Dose Admin


 


Acetaminophen  650 mg  07/11/21 02:17 





  Acetaminophen 325 Mg Tab  PO  





  Q6H PRN  





  Pain MILD(1-3)/Fever >100.5/HA  


 


Amlodipine Besylate  10 mg  07/12/21 13:00  07/16/21 09:43





  Amlodipine 10 Mg Tab  PO   10 mg





  QDAY SOTERO   Administration


 


Aspirin  81 mg  07/12/21 10:00  07/16/21 09:42





  Aspirin Ec 81 Mg Tab  PO   81 mg





  QDAY SOTERO   Administration


 


Calcium Acetate  667 mg  07/13/21 12:00  07/16/21 16:40





  Calcium Acetate 667 Mg Cap  PO   667 mg





  TIDWM SOTERO   Administration


 


Carvedilol  12.5 mg  07/11/21 22:00  07/16/21 22:23





  Carvedilol 12.5 Mg Tab  PO   12.5 mg





  BID SOTERO   Administration


 


Dextrose  0 ml  07/11/21 02:17 





  Dextrose 50% In Water (25gm) 50 Ml Syringe  IV  





  Q30MIN PRN  





  Hypoglycemia  





  Protocol  


 


Furosemide  80 mg  07/14/21 10:00  07/16/21 09:43





  Furosemide 40 Mg Tab  PO   80 mg





  QDAY SOTERO   Administration


 


Hydralazine HCl  10 mg  07/11/21 06:13  07/11/21 10:10





  Hydralazine 20 Mg/1 Ml Inj  IV   10 mg





  Q4H PRN   Administration





  Blood Pressure  


 


Sodium Chloride  100 mls @ 999 mls/hr  07/12/21 10:00 





  Nacl 0.9%  IV  





  LIBIA PRN  





  Hypotension  


 


Insulin Human Regular  0 units  07/11/21 07:30  07/17/21 09:35





  Insulin Regular, Human 100 Units/1 Ml  SUB-Q   Not Given





  [facility]  





  Protocol  


 


Isosorbide Mononitrate  30 mg  07/14/21 15:00  07/16/21 09:42





  Isosorbide Mononitrate Er 30 Mg Tab  PO   30 mg





  QDAY SOTERO   Administration


 


Magnesium Hydroxide  30 ml  07/11/21 02:17 





  Magnesium Hydroxide (Mom) Oral Liqd Udc  PO  





  Q4H PRN  





  Constipation  


 


Nitroglycerin  0.4 mg  07/13/21 06:00  07/17/21 05:38





  Nitroglycerin 0.4 Mg Patch 24hr  TD   0.4 mg





  QDAY@0600 SOTERO   Administration


 


Ondansetron HCl  4 mg  07/11/21 02:17  07/13/21 16:38





  Ondansetron 4 Mg/2 Ml Inj  IV   4 mg





  Q8H PRN   Administration





  Nausea And Vomiting  


 


Pantoprazole Sodium  40 mg  07/11/21 10:00  07/16/21 22:23





  Pantoprazole 40 Mg Inj  IV   40 mg





  BID SOTERO   Administration


 


Sodium Chloride  10 ml  07/11/21 10:00  07/16/21 22:23





  Sodium Chloride 0.9% 10 Ml Flush Syringe  IV   10 ml





  BID SOTERO   Administration


 


Sodium Chloride  10 ml  07/11/21 02:17 





  Sodium Chloride 0.9% 10 Ml Flush Syringe  IV  





  PRN PRN  





  LINE FLUSH  














Nutrition/Malnutrition Assess





- Dietary Evaluation


Nutrition/Malnutrition Findings: 


                                        





Nutrition Notes                                            Start:  07/11/21 

12:02


Freq:                                                      Status: Active       




Protocol:                                                                       




 Document     07/16/21 11:16    (Rec: 07/16/21 11:19    [id number])


 Nutrition Notes


     Initial or Follow up                        Reassessment


     Current Diagnosis                           Acute Kidney Injury,Diabetes,


                                                 Hypertension,Heart Failure,


                                                 Hyperlipidemia


     Other Pertinent Diagnosis                   elevated LFTs, GIB, anemia


     Current Diet                                Cardiac, Consistent CHO


     Labs/Tests                                  Reviewed


     Pertinent Medications                       Reviewed


     Height                                      5 ft 5 in


     Weight                                      46.1 kg


     Ideal Body Weight (kg)                      61.81


     BMI                                         16.9


     Weight Status                               Underweight


     Subjective/Other Information                FU for intakes. Per RN pt


                                                 eating 90% of meals and 90% of


                                                 ONS.


     Percent of energy/protein needs met:        100%/100%


     Burn                                        Absent


     Trauma                                      Absent


     GI Symptoms                                 None


     Current % PO                                Good (%)


     Minimum of two criteria                     Yes


     Energy Intake (non-severe)                  <75% Estimated Energy


                                                 Requirement >7 days


     Muscle Mass                                 Mild Depletion (non-severe)


     #1


      Nutrition Diagnosis                        Malnutrition


      As Evidenced by Signs and Symptoms         pt eaing 90% of meals


      Diagnosis Progress(for reassessment        Continues


       documentation)                            


     Is patient on ventilator?                   No


     Is Patient Ambulatory and/or Out of Bed     No


     REE-([facility] [place]-confined to bed)      [phone]


     Calculation Used for Recommendations        [facility]


     Additional Notes                            Protein: (1.2-1.5g/kg) 62-77g


                                                 Fluid: 1 ml/kcal or per MD


 Nutrition Intervention


     Change Diet Order:                          Continue


     Add Supplement/Snack (indicate name/kcal    Nepro BID


      /protein )                                 


     Provides kCal:                              850


     Provides Protein (gm)                       38


     Goal #1                                     Meet at least 80% of protein


                                                 and energy needs via PO and


                                                 ONS intakes


     Goal #2                                     Weight gain/maintenance


     Anticipated Discharge Needs:                Cardiac, Consistent CHO


     Follow-Up By:                               07/20/21


     Additional Comments                         FU for intakes, ONS tolerance

## 2021-07-17 NOTE — EVENT NOTE
Date: 07/17/21


I spoke with patient's daughter and granddaughter at the bedside


Answered all their questions, they were anxious to take him home


Informed them that outpatient HD placement should be scheduled by case 

management


Prior to discharge, they verbalized understanding

## 2021-07-18 LAB
ALBUMIN SERPL-MCNC: 3 G/DL (ref 3.8–4.8)
GAMMA GLOB SERPL ELPH-MCNC: 0.9 G/DL (ref 0.8–1.7)

## 2021-07-18 RX ADMIN — Medication SCH ML: at 22:07

## 2021-07-18 RX ADMIN — PANTOPRAZOLE SODIUM SCH MG: 40 TABLET, DELAYED RELEASE ORAL at 09:18

## 2021-07-18 RX ADMIN — Medication SCH: at 11:45

## 2021-07-18 RX ADMIN — CALCIUM ACETATE SCH MG: 667 CAPSULE ORAL at 09:18

## 2021-07-18 RX ADMIN — ASPIRIN SCH MG: 81 TABLET, COATED ORAL at 09:18

## 2021-07-18 RX ADMIN — INSULIN HUMAN SCH: 100 INJECTION, SOLUTION PARENTERAL at 22:07

## 2021-07-18 RX ADMIN — PANTOPRAZOLE SODIUM SCH MG: 40 TABLET, DELAYED RELEASE ORAL at 17:44

## 2021-07-18 RX ADMIN — CALCIUM ACETATE SCH MG: 667 CAPSULE ORAL at 17:43

## 2021-07-18 RX ADMIN — FUROSEMIDE SCH MG: 40 TABLET ORAL at 09:18

## 2021-07-18 RX ADMIN — INSULIN HUMAN SCH: 100 INJECTION, SOLUTION PARENTERAL at 16:50

## 2021-07-18 RX ADMIN — CALCIUM ACETATE SCH MG: 667 CAPSULE ORAL at 12:35

## 2021-07-18 RX ADMIN — NITROGLYCERIN TRANSDERMAL SYSTEM SCH: 0.4 PATCH, EXTENDED RELEASE TRANSDERMAL at 05:05

## 2021-07-18 RX ADMIN — INSULIN HUMAN SCH: 100 INJECTION, SOLUTION PARENTERAL at 07:59

## 2021-07-18 RX ADMIN — INSULIN HUMAN SCH UNITS: 100 INJECTION, SOLUTION PARENTERAL at 12:35

## 2021-07-18 NOTE — PROGRESS NOTE
Assessment and Plan





- Patient Problems


(1) Abnormal ECG


Current Visit: Yes   Status: Acute   


Plan to address problem: 


As previously reported, the ECG showed marked T wave abnormalities across the 

precordium, more pronounced than his baseline revascularization abnormalities of

LVH.  He has no prior history of coronary artery disease, but has had 

thoracotomy for repair of an ascending aortic aneurysm.  





Due to abnormal ECG, we will plan predischarge nonischemic work-up with a 

Lexiscan thallium.








(2) Preoperative cardiovascular examination


Current Visit: Yes   Status: Acute   





Subjective


Date of service: 07/18/21


Principal diagnosis: Anemia, Abnormal LFTs


Interval history: 





Patient is comfortable, in no acute distress, routine dialysis program is 

ongoing.  No chest pain, no shortness of breath, no edema.  As previously 

reported, the ECG showed marked T wave abnormalities across the precordium, more

pronounced than his baseline revascularization abnormalities of LVH.  He has no 

prior history of coronary artery disease, but has had thoracotomy for repair of 

an ascending aortic aneurysm.





Objective


                                   Vital Signs











  Temp Pulse Resp BP Pulse Ox


 


 07/18/21 09:55      97


 


 07/18/21 08:00   78   


 


 07/18/21 05:05   76   114/64 


 


 07/18/21 04:19  97.4 F L  76  17  114/64  96


 


 07/17/21 23:42  32.1 F L  80  17  125/75  96


 


 07/17/21 22:04   75   107/58 


 


 07/17/21 22:00   75   


 


 07/17/21 20:35  97.5 F L  75  16  107/58  95


 


 07/17/21 20:20      95


 


 07/17/21 19:00  98.2 F  70  18  128/69 


 


 07/17/21 18:45   68   113/73 


 


 07/17/21 18:30   72   113/68 


 


 07/17/21 18:15   72   113/67 


 


 07/17/21 18:00   71   113/67 


 


 07/17/21 17:45   70   114/71 


 


 07/17/21 17:30   70   120/67 


 


 07/17/21 17:15   70   111/66 


 


 07/17/21 17:00   69   103/64 


 


 07/17/21 16:45   69   101/62 


 


 07/17/21 16:30   69   106/64 


 


 07/17/21 16:15   68   101/62 


 


 07/17/21 16:00   67   112/65 


 


 07/17/21 15:45   67   109/67 


 


 07/17/21 15:40  69.5 F L  67  19  118/65 














- Physical Examination


General: No Apparent Distress


HEENT: Positive: PERRL


Neck: Positive: neck supple


Cardiac: Positive: Reg Rate and Rhythm


Lungs: Positive: Decreased Breath Sounds


Neuro: Positive: Weakness (Generalized weakness, frail and chronically ill 

appearing)


Abdomen: Positive: Soft


Skin: Positive: Clear


Extremities: Absent: normal





- Labs and Meds


                          Comprehensive Metabolic Panel











  07/14/21 Range/Units





  04:32 


 


Albumin  3.0 L  (3.8-4.8)  g/dL














- Allied health notes


Allied health notes reviewed: nursing

## 2021-07-18 NOTE — PROGRESS NOTE
Assessment and Plan


1. Acute kidney injury superimposed on CKD:


EDE superimposed on CKD .


CT abdomen negative for hydro.


Monitor renal function.


Renal prognosis is guarded to poor.


Avoid nephrotoxic agents.


Meds dosage based on GFR.


Patient was started on hemodialysis due to significant decline in the GFR, 

associated hyperkalemia, metabolic acidosis and volume overload.


Hemodialysis: 7/12, 7/13, 7/15, 7/17.





2. FEN:


Hyperkalemia, improved, monitor.


Anion-gap Metabolic acidosis, s/p HD, monitor.


Volume overload, improved with HD.


Monitor lytes.





3. Heavy proteinuria:


?etiology.


STVEE and ANCA are negative.


SPEP negative for M-spike.


GBM Ab and Complements are pending.





4. Decompensated CHF:


Echo EF 45-50%.


Strict I/O.


On Carvedilol.


Followed by Cards.





5. Elevated liver enzymes:


Hepatitis serologies negative.





6. GI bleed:


Per GI EGD after clearance from Cards.





7. Hypertensive urgency:


BP controlled.


UF with HD as tolerated.


Adjust meds as needed.


Monitor.





8. Hypochromic Anemia, POA:


?2/2 GI bleed.


Followed by GI.


Epogen.


Monitor.





9. Thrombocytopenia.





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





Await outpatient hemodialysis chair.











Subjective:


Patient was seen and examined at the bedside.











Examination:


General appearance: well-developed, thin built, appears stated age, not in 

distress


HEENT: ATNC, pupils equal


Neck: supple


Respiratory: bibasal diminished breath sounds


Cardiology: regular, S1S2, no murmur


Gastrointestinal: soft, bowel sounds heard, not tender


Integumentary: warm and dry, upper chest hypopigmented patches noted


Neurologic: alert, moving extremities


Ext: no edema


Hemodialysis access: L Tunnel catheter








Subjective


Date of service: 07/18/21


Principal diagnosis: Anemia, Abnormal LFTs





Objective





- Vital Signs


Vital signs: 


                               Vital Signs - 12hr











  07/17/21 07/17/21 07/18/21





  22:04 23:42 04:19


 


Temperature  32.1 F L 97.4 F L


 


Pulse Rate 75 80 76


 


Respiratory  17 17





Rate   


 


Blood Pressure 107/58 125/75 114/64


 


O2 Sat by Pulse  96 96





Oximetry   














  07/18/21 07/18/21 07/18/21





  05:05 08:00 09:55


 


Temperature   


 


Pulse Rate 76 78 


 


Respiratory   





Rate   


 


Blood Pressure 114/64  


 


O2 Sat by Pulse   97





Oximetry   














- Lab





                                 07/15/21 04:31





                                 07/19/21 03:49


                             Most recent lab results











ABG pH  7.384 pH Units (7.350-7.450)   07/11/21  14:40    


 


ABG pCO2  32.3 mm Hg  07/11/21  14:40    


 


ABG pO2  107.8 mm Hg (80.0-90.0)  H  07/11/21  14:40    


 


ABG HCO3  18.8 mmol/L (20.0-26.0)  L  07/11/21  14:40    


 


ABG O2 Saturation  97.9 % (95.0-99.0)   07/11/21  14:40    


 


Calcium  7.9 mg/dL (8.4-10.2)  L  07/17/21  06:10    


 


Phosphorus  2.60 mg/dL (2.5-4.5)   07/17/21  06:10    


 


Magnesium  2.30 mg/dL (1.7-2.3)   07/13/21  05:32    


 


Urine Creatinine  78.5 mg/dL (0.1-20.0)  H  07/11/21  15:40    


 


Urine Sodium  76 mmol/L  07/11/21  15:40    


 


Urine Total Protein  745 mg/dL (5-11.8)  H  07/11/21  15:40    














Medications & Allergies





- Medications


Allergies/Adverse Reactions: 


                                    Allergies





No Known Allergies Allergy (Unverified 01/17/15 01:13)


   








Home Medications: 


                                Home Medications











 Medication  Instructions  Recorded  Confirmed  Last Taken  Type


 


Amlodipine Besylate [Norvasc] 10 mg PO QDAY 07/10/21 07/10/21 Unknown History


 


Aspirin EC [Halfprin EC] 81 mg PO QDAY 07/10/21 07/10/21 Unknown History


 


Atorvastatin Calcium [Lipitor] 80 mg PO QDAY 07/10/21 07/10/21 Unknown History


 


Lisinopril [Zestril] 10 mg PO QDAY 07/10/21 07/10/21 Unknown History











Active Medications: 














Generic Name Dose Route Start Last Admin





  Trade Name Freq  PRN Reason Stop Dose Admin


 


Acetaminophen  650 mg  07/11/21 02:17 





  Acetaminophen 325 Mg Tab  PO  





  Q6H PRN  





  Pain MILD(1-3)/Fever >100.5/HA  


 


Amlodipine Besylate  10 mg  07/12/21 13:00  07/18/21 09:18





  Amlodipine 10 Mg Tab  PO   10 mg





  QDAY [facility]   Administration


 


Aspirin  81 mg  07/12/21 10:00  07/18/21 09:18





  Aspirin Ec 81 Mg Tab  PO   81 mg





  QDAY [facility]   Administration


 


Calcium Acetate  667 mg  07/13/21 12:00  07/18/21 09:18





  Calcium Acetate 667 Mg Cap  PO   667 mg





  TIDWM [facility]   Administration


 


Carvedilol  12.5 mg  07/11/21 22:00  07/18/21 09:18





  Carvedilol 12.5 Mg Tab  PO   12.5 mg





  BID [facility]   Administration


 


Dextrose  0 ml  07/11/21 02:17 





  Dextrose 50% In Water (25gm) 50 Ml Syringe  IV  





  Q30MIN PRN  





  Hypoglycemia  





  Protocol  


 


Furosemide  80 mg  07/14/21 10:00  07/18/21 09:18





  Furosemide 40 Mg Tab  PO   80 mg





  QDAY [facility]   Administration


 


Hydralazine HCl  10 mg  07/11/21 06:13  07/11/21 10:10





  Hydralazine 20 Mg/1 Ml Inj  IV   10 mg





  Q4H PRN   Administration





  Blood Pressure  


 


Sodium Chloride  100 mls @ 999 mls/hr  07/12/21 10:00 





  Nacl 0.9%  IV  





  LIBIA PRN  





  Hypotension  


 


Insulin Human Regular  0 units  07/11/21 07:30  07/18/21 07:59





  Insulin Regular, Human 100 Units/1 Ml  SUB-Q   Not Given





  [facility]  





  Protocol  


 


Isosorbide Mononitrate  30 mg  07/14/21 15:00  07/18/21 09:18





  Isosorbide Mononitrate Er 30 Mg Tab  PO   30 mg





  QDAY [facility]   Administration


 


Magnesium Hydroxide  30 ml  07/11/21 02:17 





  Magnesium Hydroxide (Mom) Oral Liqd Udc  PO  





  Q4H PRN  





  Constipation  


 


Nitroglycerin  0.4 mg  07/13/21 06:00  07/18/21 05:05





  Nitroglycerin 0.4 Mg Patch 24hr  TD   Not Given





  QDAY@0600 [facility]  


 


Ondansetron HCl  4 mg  07/11/21 02:17  07/13/21 16:38





  Ondansetron 4 Mg/2 Ml Inj  IV   4 mg





  Q8H PRN   Administration





  Nausea And Vomiting  


 


Pantoprazole Sodium  40 mg  07/17/21 16:30  07/18/21 09:18





  Pantoprazole 40 Mg Tab  PO   40 mg





  BIDAC [facility]   Administration


 


Sodium Chloride  10 ml  07/11/21 10:00  07/17/21 22:11





  Sodium Chloride 0.9% 10 Ml Flush Syringe  IV   10 ml





  BID [facility]   Administration


 


Sodium Chloride  10 ml  07/11/21 02:17 





  Sodium Chloride 0.9% 10 Ml Flush Syringe  IV  





  PRN PRN  





  LINE FLUSH

## 2021-07-18 NOTE — PROGRESS NOTE
Assessment and Plan


Assessment and plan: 


--Acute kidney injury superimposed on chronic kidney disease


Current Visit: Yes   Status: Acute   


Patient  with chronic kidney disease.  Worsening renal function


s/p Permacath placement, hemodialysis 7/12, 7/13


Nephrology following, outpatient HD chair placement at discharge per renal





-- Hyperkalemia/present on admission


Current Visit: Yes   Status: Acute   


Patient is received insulin and glucose, calcium gluconate sodium bicarb while 

in the  emergency room


Normal potassium levels, monitor electrolytes





-- Anemia


Current Visit: Yes   Status: Acute   


Possibly from GI bleed.


Received 1 unit of PRBC transfusion, Hb today 7.9


Stool Hemoccult done in the ER was negative


Patient has no new episodes of bleeding, hemoglobin today 7.5


Due to cardiac risk factors, cardiology not cleared for elective endoscopy, GI 

following





--GI bleed


Current Visit: Yes   Status: Acute   


GI evaluated the patient


Planning for endoscopy however


Cardiology did not clear for the procedure due to cardiac risk factors


Unless actively bleeding due to risk factors





--Acute liver injury /transaminitis


Current Visit: Yes   Status: Acute   


Plan to address problem: 


CT of the abdomen and pelvis did not reveal any acute abnormality.


Trending down.  -96, -to 74


GI following





--Hypertensive urgency


Current Visit: Yes   Status: Acute   


Moderate control , continue current antihypertensives 


As needed medications , closely monitor





-- Pulmonary edema


Current Visit: Yes   Status: Acute   


Plan to address problem: 


Pulmonary edema shown on CT scan of the abdomen and pelvis.


Patient had a dose of IV Lasix.


Will check echocardiogram.


We will place a consult to cardiology for evaluation.





--DVT prophylaxis


Current Visit: Yes   Status: Acute   


Plan to address problem: 


Patient placed on sequential compression device.





-- Full code status 


Current Visit: Yes   Status: Acute   


Plan to address problem: 


Patient is full code.





Follow consultants recommendations





brief history and daily hospital course


7/13/2021; patient received permacath placement


Hemodialysis per schedule, possible endoscopy tomorrow





7/14/2021; patient feels slightly better, GI waiting for


Cardiology clearance for endoscopy





7/15/2021; no new episodes of GI bleeding


HPI low stable 7.5 today


Due to multiple cardiac risk factors, cardiology did not


For elective endoscopy, unless life-threatening bleeding.





7/16/2021; patient feels better


No new episodes of GI bleeding, no endoscopy during this admission


Outpatient HD placement per nephrology prior to discharge





7/17/2021; awaiting outpatient HD placement


I spoke with patient's daughter and granddaughter at the bedside


Answered all their questions, they were anxious to take him home


Informed them that outpatient HD placement should be scheduled by case 

management


Prior to discharge, they verbalized understanding





7/18/2021; patient is medically stable for discharge


Awaiting outpatient HD placement




















History


Interval history: 


I have seen and examined the patient at the bedside


Patient's chart and medications reviewed


Patient feels better no new complaints


Awaiting outpatient HD placement














Hospitalist Physical





- Constitutional


Vitals: 


                                        











Temp Pulse Resp BP Pulse Ox


 


 97.4 F L  78   17   114/64   97 


 


 07/18/21 04:19  07/18/21 08:00  07/18/21 04:19  07/18/21 05:05  07/18/21 09:55











General appearance: Present: no acute distress, cachectic, other (Patient is 

lethargic, frail and chronically ill-appearing)





- EENT


Eyes: Present: PERRL, EOM intact





- Neck


Neck: Present: supple, normal ROM





- Respiratory


Respiratory effort: normal


Respiratory: bilateral: diminished, negative: rales, rhonchi, wheezing





- Cardiovascular


Rhythm: regular


Heart Sounds: Present: S1 & S2





- Extremities


Extremities: no ischemia, No edema





- Abdominal


General gastrointestinal: soft, non-tender, non-distended, normal bowel sounds





- Integumentary


Integumentary: Present: clear, warm





- Psychiatric


Psychiatric: appropriate mood/affect, cooperative





- Neurologic


Neurologic: CNII-XII intact, moves all extremities





Results





- Labs


CBC & Chem 7: 


                                 07/15/21 04:31





                                 07/17/21 06:10


Labs: 


                             Laboratory Last Values











WBC  7.6 K/mm3 (4.5-11.0)   07/15/21  04:31    


 


RBC  3.52 M/mm3 (3.65-5.03)  L  07/15/21  04:31    


 


Hgb  7.5 gm/dl (11.8-15.2)  L  07/15/21  04:31    


 


Hct  23.5 % (35.5-45.6)  L  07/15/21  04:31    


 


MCV  67 fl (84-94)  L  07/15/21  04:31    


 


MCH  21 pg (28-32)  L  07/15/21  04:31    


 


MCHC  32 % (32-34)   07/15/21  04:31    


 


RDW  18.3 % (13.2-15.2)  H  07/15/21  04:31    


 


Plt Count  123 K/mm3 (140-440)  L  07/15/21  04:31    


 


Lymph % (Auto)  15.8 % (13.4-35.0)   07/15/21  04:31    


 


Mono % (Auto)  13.8 % (0.0-7.3)  H  07/15/21  04:31    


 


Eos % (Auto)  1.2 % (0.0-4.3)   07/15/21  04:31    


 


Baso % (Auto)  0.4 % (0.0-1.8)   07/15/21  04:31    


 


Lymph # (Auto)  1.2 K/mm3 (1.2-5.4)   07/15/21  04:31    


 


Mono # (Auto)  1.0 K/mm3 (0.0-0.8)  H  07/15/21  04:31    


 


Eos # (Auto)  0.1 K/mm3 (0.0-0.4)   07/15/21  04:31    


 


Baso # (Auto)  0.0 K/mm3 (0.0-0.1)   07/15/21  04:31    


 


Add Manual Diff  Complete   07/12/21  04:25    


 


Total Counted  100   07/12/21  04:25    


 


Seg Neutrophils %  68.8 % (40.0-70.0)   07/15/21  04:31    


 


Seg Neuts % (Manual)  87.0 % (40.0-70.0)  H  07/12/21  04:25    


 


Band Neutrophils %  1.0 %  07/12/21  04:25    


 


Lymphocytes % (Manual)  5.0 % (13.4-35.0)  L  07/12/21  04:25    


 


Monocytes % (Manual)  7.0 % (0.0-7.3)   07/12/21  04:25    


 


Nucleated RBC %  1.0 % (0.0-0.9)  H  07/12/21  04:25    


 


Seg Neutrophils #  5.3 K/mm3 (1.8-7.7)   07/15/21  04:31    


 


Seg Neutrophils # Man  7.0 K/mm3 (1.8-7.7)   07/12/21  04:25    


 


Band Neutrophils #  0.1 K/mm3  07/12/21  04:25    


 


Lymphocytes # (Manual)  0.4 K/mm3 (1.2-5.4)  L  07/12/21  04:25    


 


Abs React Lymphs (Man)  0.0 K/mm3  07/12/21  04:25    


 


Monocytes # (Manual)  0.6 K/mm3 (0.0-0.8)   07/12/21  04:25    


 


Eosinophils # (Manual)  0.0 K/mm3 (0.0-0.4)   07/12/21  04:25    


 


Basophils # (Manual)  0.0 K/mm3 (0.0-0.1)   07/12/21  04:25    


 


Metamyelocytes #  0.0 K/mm3  07/12/21  04:25    


 


Myelocytes #  0.0 K/mm3  07/12/21  04:25    


 


Promyelocytes #  0.0 K/mm3  07/12/21  04:25    


 


Blast Cells #  0.0 K/mm3  07/12/21  04:25    


 


WBC Morphology  Not Reportable   07/12/21  04:25    


 


Hypersegmented Neuts  Not Reportable   07/12/21  04:25    


 


Hyposegmented Neuts  Not Reportable   07/12/21  04:25    


 


Hypogranular Neuts  Not Reportable   07/12/21  04:25    


 


Smudge Cells  Not Reportable   07/12/21  04:25    


 


Toxic Granulation  Not Reportable   07/12/21  04:25    


 


Toxic Vacuolation  Not Reportable   07/12/21  04:25    


 


Dohle Bodies  Not Reportable   07/12/21  04:25    


 


Pelger-Huet Anomaly  Not Reportable   07/12/21  04:25    


 


Curt Rods  Not Reportable   07/12/21  04:25    


 


Platelet Estimate  Consistent w auto   07/12/21  04:25    


 


Clumped Platelets  Not Reportable   07/12/21  04:25    


 


Plt Clumps, EDTA  Not Reportable   07/12/21  04:25    


 


Large Platelets  Not Reportable   07/12/21  04:25    


 


Giant Platelets  Not Reportable   07/12/21  04:25    


 


Platelet Satelliting  Not Reportable   07/12/21  04:25    


 


Plt Morphology Comment  Not Reportable   07/12/21  04:25    


 


RBC Morphology  Not Reportable   07/12/21  04:25    


 


Dimorphic RBCs  Not Reportable   07/12/21  04:25    


 


Polychromasia  Few   07/12/21  04:25    


 


Hypochromasia  2+   07/12/21  04:25    


 


Poikilocytosis  Not Reportable   07/12/21  04:25    


 


Anisocytosis  2+   07/12/21  04:25    


 


Microcytosis  Not Reportable   07/12/21  04:25    


 


Macrocytosis  Not Reportable   07/12/21  04:25    


 


Spherocytes  Not Reportable   07/12/21  04:25    


 


Pappenheimer Bodies  Not Reportable   07/12/21  04:25    


 


Sickle Cells  Not Reportable   07/12/21  04:25    


 


Target Cells  Not Reportable   07/12/21  04:25    


 


Tear Drop Cells  Not Reportable   07/12/21  04:25    


 


Ovalocytes  Not Reportable   07/12/21  04:25    


 


Helmet Cells  Not Reportable   07/12/21  04:25    


 


[place] Bodies  Not Reportable   07/12/21  04:25    


 


Cabot Rings  Not Reportable   07/12/21  04:25    


 


P[place] Cells  Not Reportable   07/12/21  04:25    


 


Bite Cells  Not Reportable   07/12/21  04:25    


 


Crenated Cell  Not Reportable   07/12/21  04:25    


 


Elliptocytes  Not Reportable   07/12/21  04:25    


 


Acanthocytes (Spur)  Not Reportable   07/12/21  04:25    


 


Rouleaux  Not Reportable   07/12/21  04:25    


 


Hemoglobin C Crystals  Not Reportable   07/12/21  04:25    


 


Schistocytes  1+   07/12/21  04:25    


 


Malaria parasites  Not Reportable   07/12/21  04:25    


 


Bryan Bodies  Not Reportable   07/12/21  04:25    


 


Hem Pathologist Commnt  No   07/12/21  04:25    


 


PT  18.4 Sec. (12.2-14.9)  H  07/13/21  19:20    


 


INR  1.48  (0.87-1.13)  H  07/13/21  19:20    


 


APTT  33.8 Sec. (24.2-36.6)   07/13/21  19:20    


 


ABG pH  7.384 pH Units (7.350-7.450)   07/11/21  14:40    


 


ABG pCO2  32.3 mm Hg  07/11/21  14:40    


 


ABG pO2  107.8 mm Hg (80.0-90.0)  H  07/11/21  14:40    


 


ABG HCO3  18.8 mmol/L (20.0-26.0)  L  07/11/21  14:40    


 


ABG O2 Saturation  97.9 % (95.0-99.0)   07/11/21  14:40    


 


ABG O2 Content  10.3  (0.0-44)   07/11/21  14:40    


 


ABG Base Excess  -5.6 mmol/L (-2.0-3.0)  L  07/11/21  14:40    


 


ABG Hemoglobin  7.4 gm/dl (14.0-18.0)  L  07/11/21  14:40    


 


ABG Carboxyhemoglobin  1.5 % (0.0-5.0)   07/11/21  14:40    


 


ABG Methemoglobin  0.5 % (0.0-1.5)   07/11/21  14:40    


 


Oxyhemoglobin  95.9 % (95.0-99.0)   07/11/21  14:40    


 


FiO2  28 %  07/11/21  14:40    


 


Sodium  135 mmol/L (137-145)  L  07/17/21  06:10    


 


Potassium  3.6 mmol/L (3.6-5.0)   07/17/21  06:10    


 


Chloride  97.8 mmol/L ()  L  07/17/21  06:10    


 


Carbon Dioxide  26 mmol/L (22-30)   07/17/21  06:10    


 


Anion Gap  15 mmol/L  07/17/21  06:10    


 


BUN  62 mg/dL (9-20)  H  07/17/21  06:10    


 


Creatinine  6.0 mg/dL (0.8-1.3)  H  07/17/21  06:10    


 


Estimated GFR  9 ml/min  07/17/21  06:10    


 


BUN/Creatinine Ratio  10 %  07/17/21  06:10    


 


Glucose  117 mg/dL ()  H  07/17/21  06:10    


 


POC Glucose  95 mg/dL ()   07/18/21  07:40    


 


Hemoglobin A1c  5.3 % (4-6)   07/11/21  02:00    


 


Calcium  7.9 mg/dL (8.4-10.2)  L  07/17/21  06:10    


 


Phosphorus  2.60 mg/dL (2.5-4.5)   07/17/21  06:10    


 


Magnesium  2.30 mg/dL (1.7-2.3)   07/13/21  05:32    


 


Total Bilirubin  0.60 mg/dL (0.1-1.2)   07/15/21  04:31    


 


AST  96 units/L (5-40)  H  07/15/21  04:31    


 


ALT  274 units/L (7-56)  H  07/15/21  04:31    


 


Alkaline Phosphatase  92 units/L ()   07/15/21  04:31    


 


Serum Total Protein  5.4 g/dL (6.1-8.1)  L  07/14/21  04:32    


 


Total Protein  5.1 g/dL (6.3-8.2)  L  07/15/21  04:31    


 


Albumin  3.0 g/dL (3.9-5)  L  07/15/21  04:31    


 


Albumin/Globulin Ratio  1.4 %  07/15/21  04:31    


 


Alpha-1-Globulins  0.4 g/dL (0.2-0.3)  H  07/14/21  04:32    


 


Alpha-2-Globulins  0.5 g/dL (0.5-0.9)   07/14/21  04:32    


 


Beta Globulins  0.2 g/dL (0.2-0.5)   07/14/21  04:32    


 


Gamma Globulins  0.9 g/dL (0.8-1.7)   07/14/21  04:32    


 


Abnorm Protein Band 1  see below   07/14/21  04:32    


 


PEP Interpretation  see below  H  07/14/21  04:32    


 


Lipase  51 units/L (13-60)   07/10/21  22:43    


 


PTH Intact  176.3 pg/mL (15-65)  H  07/12/21  04:25    


 


Urine Color  Yellow  (Yellow)   07/10/21  Unknown


 


Urine Turbidity  Cloudy  (Clear)   07/10/21  Unknown


 


Urine pH  5.0  (5.0-7.0)   07/10/21  Unknown


 


Ur Specific Gravity  1.018  (1.003-1.030)   07/10/21  Unknown


 


Urine Protein  >500 mg/dL (Negative)   07/10/21  Unknown


 


Urine Glucose (UA)  50 mg/dL (Negative)   07/10/21  Unknown


 


Urine Ketones  Neg mg/dL (Negative)   07/10/21  Unknown


 


Urine Blood  Mod  (Negative)   07/10/21  Unknown


 


Urine Nitrite  Neg  (Negative)   07/10/21  Unknown


 


Urine Bilirubin  Neg  (Negative)   07/10/21  Unknown


 


Urine Urobilinogen  < 2.0 mg/dL (<2.0)   07/10/21  Unknown


 


Ur Leukocyte Esterase  Neg  (Negative)   07/10/21  Unknown


 


Urine WBC (Auto)  4.0 /HPF (0.0-6.0)   07/10/21  Unknown


 


Urine RBC (Auto)  10.0 /HPF (0.0-6.0)   07/10/21  Unknown


 


U Epithel Cells (Auto)  1.0 /HPF (0-13.0)   07/10/21  Unknown


 


Urine Mucus  Few /HPF  07/10/21  Unknown


 


Urine Creatinine  78.5 mg/dL (0.1-20.0)  H  07/11/21  15:40    


 


Protein/Creatinin Ratio  9.49   07/11/21  15:40    


 


Urine Sodium  76 mmol/L  07/11/21  15:40    


 


Urine Total Protein  745 mg/dL (5-11.8)  H  07/11/21  15:40    


 


Proteinase 3 (PR3) Ab  <1.0 AI (<1.0)   07/14/21  04:32    


 


Myeloperoxidase Ab  <1.0 AI (<1.0)   07/14/21  04:32    


 


Coronavirus (PCR)  Negative  (Negative)   07/16/21  Unknown


 


Hepatitis A IgM Ab  Non-reactive  (NonReactive)   07/11/21  00:00    


 


Hep Bs Antigen  Non-reactive  (Negative)   07/11/21  00:00    


 


Hep B Core IgM Ab  Non-reactive  (NonReactive)   07/11/21  00:00    


 


Hepatitis C Antibody  Non-reactive  (NonReactive)   07/11/21  00:00    


 


Blood Type  O POSITIVE   07/12/21  10:53    


 


Antibody Screen  Negative   07/12/21  10:53    


 


Crossmatch  See Detail   07/12/21  10:53    











Malave/IV: 


                                        





Voiding Method                   Bedside Commode











Active Medications





- Current Medications


Current Medications: 














Generic Name Dose Route Start Last Admin





  Trade Name Freq  PRN Reason Stop Dose Admin


 


Acetaminophen  650 mg  07/11/21 02:17 





  Acetaminophen 325 Mg Tab  PO  





  Q6H PRN  





  Pain MILD(1-3)/Fever >100.5/HA  


 


Amlodipine Besylate  10 mg  07/12/21 13:00  07/18/21 09:18





  Amlodipine 10 Mg Tab  PO   10 mg





  QDAY SOTERO   Administration


 


Aspirin  81 mg  07/12/21 10:00  07/18/21 09:18





  Aspirin Ec 81 Mg Tab  PO   81 mg





  QDAY [facility]   Administration


 


Calcium Acetate  667 mg  07/13/21 12:00  07/18/21 09:18





  Calcium Acetate 667 Mg Cap  PO   667 mg





  TIDWM SOTERO   Administration


 


Carvedilol  12.5 mg  07/11/21 22:00  07/18/21 09:18





  Carvedilol 12.5 Mg Tab  PO   12.5 mg





  BID SOTERO   Administration


 


Dextrose  0 ml  07/11/21 02:17 





  Dextrose 50% In Water (25gm) 50 Ml Syringe  IV  





  Q30MIN PRN  





  Hypoglycemia  





  Protocol  


 


Furosemide  80 mg  07/14/21 10:00  07/18/21 09:18





  Furosemide 40 Mg Tab  PO   80 mg





  QDAY [facility]   Administration


 


Hydralazine HCl  10 mg  07/11/21 06:13  07/11/21 10:10





  Hydralazine 20 Mg/1 Ml Inj  IV   10 mg





  Q4H PRN   Administration





  Blood Pressure  


 


Sodium Chloride  100 mls @ 999 mls/hr  07/12/21 10:00 





  Nacl 0.9%  IV  





  LIBIA PRN  





  Hypotension  


 


Insulin Human Regular  0 units  07/11/21 07:30  07/18/21 07:59





  Insulin Regular, Human 100 Units/1 Ml  SUB-Q   Not Given





  ACHS [facility]  





  Protocol  


 


Isosorbide Mononitrate  30 mg  07/14/21 15:00  07/18/21 09:18





  Isosorbide Mononitrate Er 30 Mg Tab  PO   30 mg





  QDAY [facility]   Administration


 


Magnesium Hydroxide  30 ml  07/11/21 02:17 





  Magnesium Hydroxide (Mom) Oral Liqd Udc  PO  





  Q4H PRN  





  Constipation  


 


Nitroglycerin  0.4 mg  07/13/21 06:00  07/18/21 05:05





  Nitroglycerin 0.4 Mg Patch 24hr  TD   Not Given





  QDAY@0600 [facility]  


 


Ondansetron HCl  4 mg  07/11/21 02:17  07/13/21 16:38





  Ondansetron 4 Mg/2 Ml Inj  IV   4 mg





  Q8H PRN   Administration





  Nausea And Vomiting  


 


Pantoprazole Sodium  40 mg  07/17/21 16:30  07/18/21 09:18





  Pantoprazole 40 Mg Tab  PO   40 mg





  BIDAC SOTERO   Administration


 


Sodium Chloride  10 ml  07/11/21 10:00  07/17/21 22:11





  Sodium Chloride 0.9% 10 Ml Flush Syringe  IV   10 ml





  BID SOTERO   Administration


 


Sodium Chloride  10 ml  07/11/21 02:17 





  Sodium Chloride 0.9% 10 Ml Flush Syringe  IV  





  PRN PRN  





  LINE FLUSH  














Nutrition/Malnutrition Assess





- Dietary Evaluation


Nutrition/Malnutrition Findings: 


                                        





Nutrition Notes                                            Start:  07/11/21 

12:02


Freq:                                                      Status: Active       




Protocol:                                                                       




 Document     07/16/21 11:16    (Rec: 07/16/21 11:19    [id number])


 Nutrition Notes


     Initial or Follow up                        Reassessment


     Current Diagnosis                           Acute Kidney Injury,Diabetes,


                                                 Hypertension,Heart Failure,


                                                 Hyperlipidemia


     Other Pertinent Diagnosis                   elevated LFTs, GIB, anemia


     Current Diet                                Cardiac, Consistent CHO


     Labs/Tests                                  Reviewed


     Pertinent Medications                       Reviewed


     Height                                      5 ft 5 in


     Weight                                      46.1 kg


     Ideal Body Weight (kg)                      61.81


     BMI                                         16.9


     Weight Status                               Underweight


     Subjective/Other Information                FU for intakes. Per RN pt


                                                 eating 90% of meals and 90% of


                                                 ONS.


     Percent of energy/protein needs met:        100%/100%


     Burn                                        Absent


     Trauma                                      Absent


     GI Symptoms                                 None


     Current % PO                                Good (%)


     Minimum of two criteria                     Yes


     Energy Intake (non-severe)                  <75% Estimated Energy


                                                 Requirement >7 days


     Muscle Mass                                 Mild Depletion (non-severe)


     #1


      Nutrition Diagnosis                        Malnutrition


      As Evidenced by Signs and Symptoms         pt eaing 90% of meals


      Diagnosis Progress(for reassessment        Continues


       documentation)                            


     Is patient on ventilator?                   No


     Is Patient Ambulatory and/or Out of Bed     No


     REE-([facility]-confined to bed)      [phone]


     Calculation Used for Recommendations        [facility]


     Additional Notes                            Protein: (1.2-1.5g/kg) 62-77g


                                                 Fluid: 1 ml/kcal or per MD


 Nutrition Intervention


     Change Diet Order:                          Continue


     Add Supplement/Snack (indicate name/kcal    Nepro BID


      /protein )                                 


     Provides kCal:                              850


     Provides Protein (gm)                       38


     Goal #1                                     Meet at least 80% of protein


                                                 and energy needs via PO and


                                                 ONS intakes


     Goal #2                                     Weight gain/maintenance


     Anticipated Discharge Needs:                Cardiac, Consistent CHO


     Follow-Up By:                               07/20/21


     Additional Comments                         FU for intakes, ONS tolerance

## 2021-07-19 LAB
BUN SERPL-MCNC: 54 MG/DL (ref 9–20)
BUN/CREAT SERPL: 11 %
CALCIUM SERPL-MCNC: 7.6 MG/DL (ref 8.4–10.2)
HEMOLYSIS INDEX: 1

## 2021-07-19 RX ADMIN — Medication SCH: at 19:23

## 2021-07-19 RX ADMIN — CALCIUM ACETATE SCH: 667 CAPSULE ORAL at 19:23

## 2021-07-19 RX ADMIN — INSULIN HUMAN SCH UNITS: 100 INJECTION, SOLUTION PARENTERAL at 21:33

## 2021-07-19 RX ADMIN — Medication SCH ML: at 21:32

## 2021-07-19 RX ADMIN — Medication SCH MG: at 21:30

## 2021-07-19 RX ADMIN — INSULIN HUMAN SCH: 100 INJECTION, SOLUTION PARENTERAL at 09:11

## 2021-07-19 RX ADMIN — FUROSEMIDE SCH: 40 TABLET ORAL at 19:23

## 2021-07-19 RX ADMIN — NITROGLYCERIN TRANSDERMAL SYSTEM SCH: 0.4 PATCH, EXTENDED RELEASE TRANSDERMAL at 09:11

## 2021-07-19 RX ADMIN — INSULIN HUMAN SCH: 100 INJECTION, SOLUTION PARENTERAL at 19:23

## 2021-07-19 RX ADMIN — PANTOPRAZOLE SODIUM SCH: 40 TABLET, DELAYED RELEASE ORAL at 19:22

## 2021-07-19 RX ADMIN — CALCIUM ACETATE SCH: 667 CAPSULE ORAL at 09:12

## 2021-07-19 RX ADMIN — ASPIRIN SCH: 81 TABLET, COATED ORAL at 19:22

## 2021-07-19 NOTE — PROGRESS NOTE
Assessment and Plan


Assessment and plan: 


--Hypokalemia; K3.5


Current Visit: Yes   Status: Acute   


 Replenish per protocol and monitor levels 





--Hypomagnesemia;


Current Visit: Yes   Status: Acute   


Nephro placed on magnesium oxide 400 twice a day


Monitor electrolytes





--Acute kidney injury superimposed on chronic kidney disease


Current Visit: Yes   Status: Acute   


Patient  with chronic kidney disease.  Worsening renal function


s/p Permacath placement, hemodialysis 7/12, 7/13


Nephrology following, outpatient HD chair placement at discharge per renal. 





-- Hyperkalemia/present on admission


Current Visit: Yes   Status: Acute   


Patient is received insulin and glucose, calcium gluconate sodium bicarb while 

in the  emergency room


Normal potassium levels, monitor electrolytes





-- Anemia


Current Visit: Yes   Status: Acute   


Possibly from GI bleed.


Received 1 unit of PRBC transfusion, Hb today 7.9


Stool Hemoccult done in the ER was negative


Patient has no new episodes of bleeding, hemoglobin today 7.5


Due to cardiac risk factors, cardiology not cleared for elective endoscopy, GI 

following





--GI bleed


Current Visit: Yes   Status: Acute   


GI evaluated the patient


Planning for endoscopy however


Cardiology did not clear for the procedure due to cardiac risk factors


Unless actively bleeding due to risk factors





--Acute liver injury /transaminitis


Current Visit: Yes   Status: Acute   


Plan to address problem: 


CT of the abdomen and pelvis did not reveal any acute abnormality.


Trending down.  -96, -to 74


GI following





--Hypertensive urgency


Current Visit: Yes   Status: Acute   


Moderate control , continue current antihypertensives 


As needed medications , closely monitor





-- Pulmonary edema


Current Visit: Yes   Status: Acute   


Plan to address problem: 


Pulmonary edema shown on CT scan of the abdomen and pelvis.


Patient had a dose of IV Lasix.


Will check echocardiogram.


We will place a consult to cardiology for evaluation.





--Moderate to severe malnutrition;


Current Visit: Yes   Status: Chronic


Treat the underlying cause, nutrition supplements


Nutrition consult





--DVT prophylaxis


Current Visit: Yes   Status: Acute   


Plan to address problem: 


Patient placed on sequential compression device.





-- Full code status 


Current Visit: Yes   Status: Acute   


Plan to address problem: 


Patient is full code.





Follow consultants recommendations





Brief history and daily hospital course:


7/13/2021; patient received permacath placement


Hemodialysis per schedule, possible endoscopy tomorrow





7/14/2021; patient feels slightly better, GI waiting for


Cardiology clearance for endoscopy





7/15/2021; no new episodes of GI bleeding


HPI low stable 7.5 today


Due to multiple cardiac risk factors, cardiology did not


For elective endoscopy, unless life-threatening bleeding.





7/16/2021; patient feels better


No new episodes of GI bleeding, no endoscopy during this admission


Outpatient HD placement per nephrology prior to discharge





7/17/2021; awaiting outpatient HD placement


I spoke with patient's daughter and granddaughter at the bedside


Answered all their questions, they were anxious to take him home


Informed them that outpatient HD placement should be scheduled by case 

management


Prior to discharge, they verbalized understanding





7/18/2021; patient is medically stable for discharge


Awaiting outpatient HD placement





7/19/2021;


Hypokalemia, hypomagnesemia, replenish per protocol 


awaiting outpatient HD placement


Disposition per nephrology

















History


Interval history: 


I have seen and examined the patient


Patient's chart and medications reviewed


Patient received hemodialysis today


Awaiting outpatient HD placement


Patient has no new complaints


Vital signs reviewed








Hospitalist Physical





- Constitutional


Vitals: 


                                        











Temp Pulse Resp BP Pulse Ox


 


 98.8 F   72   16   113/67   97 


 


 07/19/21 03:33  07/19/21 03:33  07/19/21 03:33  07/19/21 03:33  07/19/21 07:54











General appearance: Present: no acute distress, cachectic, other (Patient is 

lethargic, frail and chronically ill-appearing)





- EENT


Eyes: Present: PERRL, EOM intact





- Neck


Neck: Present: supple, normal ROM





- Respiratory


Respiratory effort: normal


Respiratory: bilateral: diminished, rales, negative: rhonchi, wheezing





- Cardiovascular


Rhythm: regular


Heart Sounds: Present: S1 & S2





- Extremities


Extremities: no ischemia, No edema





- Abdominal


General gastrointestinal: soft, non-tender, non-distended, normal bowel sounds





- Integumentary


Integumentary: Present: clear, warm





- Psychiatric


Psychiatric: appropriate mood/affect, cooperative





- Neurologic


Neurologic: moves all extremities





Results





- Labs


CBC & Chem 7: 


                                 07/15/21 04:31





                                 07/19/21 03:49


Labs: 


                             Laboratory Last Values











WBC  7.6 K/mm3 (4.5-11.0)   07/15/21  04:31    


 


RBC  3.52 M/mm3 (3.65-5.03)  L  07/15/21  04:31    


 


Hgb  7.5 gm/dl (11.8-15.2)  L  07/15/21  04:31    


 


Hct  23.5 % (35.5-45.6)  L  07/15/21  04:31    


 


MCV  67 fl (84-94)  L  07/15/21  04:31    


 


MCH  21 pg (28-32)  L  07/15/21  04:31    


 


MCHC  32 % (32-34)   07/15/21  04:31    


 


RDW  18.3 % (13.2-15.2)  H  07/15/21  04:31    


 


Plt Count  123 K/mm3 (140-440)  L  07/15/21  04:31    


 


Lymph % (Auto)  15.8 % (13.4-35.0)   07/15/21  04:31    


 


Mono % (Auto)  13.8 % (0.0-7.3)  H  07/15/21  04:31    


 


Eos % (Auto)  1.2 % (0.0-4.3)   07/15/21  04:31    


 


Baso % (Auto)  0.4 % (0.0-1.8)   07/15/21  04:31    


 


Lymph # (Auto)  1.2 K/mm3 (1.2-5.4)   07/15/21  04:31    


 


Mono # (Auto)  1.0 K/mm3 (0.0-0.8)  H  07/15/21  04:31    


 


Eos # (Auto)  0.1 K/mm3 (0.0-0.4)   07/15/21  04:31    


 


Baso # (Auto)  0.0 K/mm3 (0.0-0.1)   07/15/21  04:31    


 


Add Manual Diff  Complete   07/12/21  04:25    


 


Total Counted  100   07/12/21  04:25    


 


Seg Neutrophils %  68.8 % (40.0-70.0)   07/15/21  04:31    


 


Seg Neuts % (Manual)  87.0 % (40.0-70.0)  H  07/12/21  04:25    


 


Band Neutrophils %  1.0 %  07/12/21  04:25    


 


Lymphocytes % (Manual)  5.0 % (13.4-35.0)  L  07/12/21  04:25    


 


Monocytes % (Manual)  7.0 % (0.0-7.3)   07/12/21  04:25    


 


Nucleated RBC %  1.0 % (0.0-0.9)  H  07/12/21  04:25    


 


Seg Neutrophils #  5.3 K/mm3 (1.8-7.7)   07/15/21  04:31    


 


Seg Neutrophils # Man  7.0 K/mm3 (1.8-7.7)   07/12/21  04:25    


 


Band Neutrophils #  0.1 K/mm3  07/12/21  04:25    


 


Lymphocytes # (Manual)  0.4 K/mm3 (1.2-5.4)  L  07/12/21  04:25    


 


Abs React Lymphs (Man)  0.0 K/mm3  07/12/21  04:25    


 


Monocytes # (Manual)  0.6 K/mm3 (0.0-0.8)   07/12/21  04:25    


 


Eosinophils # (Manual)  0.0 K/mm3 (0.0-0.4)   07/12/21  04:25    


 


Basophils # (Manual)  0.0 K/mm3 (0.0-0.1)   07/12/21  04:25    


 


Metamyelocytes #  0.0 K/mm3  07/12/21  04:25    


 


Myelocytes #  0.0 K/mm3  07/12/21  04:25    


 


Promyelocytes #  0.0 K/mm3  07/12/21  04:25    


 


Blast Cells #  0.0 K/mm3  07/12/21  04:25    


 


WBC Morphology  Not Reportable   07/12/21  04:25    


 


Hypersegmented Neuts  Not Reportable   07/12/21  04:25    


 


Hyposegmented Neuts  Not Reportable   07/12/21  04:25    


 


Hypogranular Neuts  Not Reportable   07/12/21  04:25    


 


Smudge Cells  Not Reportable   07/12/21  04:25    


 


Toxic Granulation  Not Reportable   07/12/21  04:25    


 


Toxic Vacuolation  Not Reportable   07/12/21  04:25    


 


Dohle Bodies  Not Reportable   07/12/21  04:25    


 


Pelger-Huet Anomaly  Not Reportable   07/12/21  04:25    


 


Curt Rods  Not Reportable   07/12/21  04:25    


 


Platelet Estimate  Consistent w auto   07/12/21  04:25    


 


Clumped Platelets  Not Reportable   07/12/21  04:25    


 


Plt Clumps, EDTA  Not Reportable   07/12/21  04:25    


 


Large Platelets  Not Reportable   07/12/21  04:25    


 


Giant Platelets  Not Reportable   07/12/21  04:25    


 


Platelet Satelliting  Not Reportable   07/12/21  04:25    


 


Plt Morphology Comment  Not Reportable   07/12/21  04:25    


 


RBC Morphology  Not Reportable   07/12/21  04:25    


 


Dimorphic RBCs  Not Reportable   07/12/21  04:25    


 


Polychromasia  Few   07/12/21  04:25    


 


Hypochromasia  2+   07/12/21  04:25    


 


Poikilocytosis  Not Reportable   07/12/21  04:25    


 


Anisocytosis  2+   07/12/21  04:25    


 


Microcytosis  Not Reportable   07/12/21  04:25    


 


Macrocytosis  Not Reportable   07/12/21  04:25    


 


Spherocytes  Not Reportable   07/12/21  04:25    


 


Pappenheimer Bodies  Not Reportable   07/12/21  04:25    


 


Sickle Cells  Not Reportable   07/12/21  04:25    


 


Target Cells  Not Reportable   07/12/21  04:25    


 


Tear Drop Cells  Not Reportable   07/12/21  04:25    


 


Ovalocytes  Not Reportable   07/12/21  04:25    


 


Helmet Cells  Not Reportable   07/12/21  04:25    


 


Negrete-Dunwoody Bodies  Not Reportable   07/12/21  04:25    


 


Cabot Rings  Not Reportable   07/12/21  04:25    


 


Ephrata Cells  Not Reportable   07/12/21  04:25    


 


Bite Cells  Not Reportable   07/12/21  04:25    


 


Crenated Cell  Not Reportable   07/12/21  04:25    


 


Elliptocytes  Not Reportable   07/12/21  04:25    


 


Acanthocytes (Spur)  Not Reportable   07/12/21  04:25    


 


Rouleaux  Not Reportable   07/12/21  04:25    


 


Hemoglobin C Crystals  Not Reportable   07/12/21  04:25    


 


Schistocytes  1+   07/12/21  04:25    


 


Malaria parasites  Not Reportable   07/12/21  04:25    


 


Bryan Bodies  Not Reportable   07/12/21  04:25    


 


Hem Pathologist Commnt  No   07/12/21  04:25    


 


PT  18.4 Sec. (12.2-14.9)  H  07/13/21  19:20    


 


INR  1.48  (0.87-1.13)  H  07/13/21  19:20    


 


APTT  33.8 Sec. (24.2-36.6)   07/13/21  19:20    


 


ABG pH  7.384 pH Units (7.350-7.450)   07/11/21  14:40    


 


ABG pCO2  32.3 mm Hg  07/11/21  14:40    


 


ABG pO2  107.8 mm Hg (80.0-90.0)  H  07/11/21  14:40    


 


ABG HCO3  18.8 mmol/L (20.0-26.0)  L  07/11/21  14:40    


 


ABG O2 Saturation  97.9 % (95.0-99.0)   07/11/21  14:40    


 


ABG O2 Content  10.3  (0.0-44)   07/11/21  14:40    


 


ABG Base Excess  -5.6 mmol/L (-2.0-3.0)  L  07/11/21  14:40    


 


ABG Hemoglobin  7.4 gm/dl (14.0-18.0)  L  07/11/21  14:40    


 


ABG Carboxyhemoglobin  1.5 % (0.0-5.0)   07/11/21  14:40    


 


ABG Methemoglobin  0.5 % (0.0-1.5)   07/11/21  14:40    


 


Oxyhemoglobin  95.9 % (95.0-99.0)   07/11/21  14:40    


 


FiO2  28 %  07/11/21  14:40    


 


Sodium  134 mmol/L (137-145)  L  07/19/21  03:49    


 


Potassium  3.5 mmol/L (3.6-5.0)  L  07/19/21  03:49    


 


Chloride  96.0 mmol/L ()  L  07/19/21  03:49    


 


Carbon Dioxide  28 mmol/L (22-30)   07/19/21  03:49    


 


Anion Gap  14 mmol/L  07/19/21  03:49    


 


BUN  54 mg/dL (9-20)  H  07/19/21  03:49    


 


Creatinine  5.0 mg/dL (0.8-1.3)  H  07/19/21  03:49    


 


Estimated GFR  11 ml/min  07/19/21  03:49    


 


BUN/Creatinine Ratio  11 %  07/19/21  03:49    


 


Glucose  95 mg/dL ()   07/19/21  03:49    


 


POC Glucose  100 mg/dL ()   07/19/21  08:08    


 


Hemoglobin A1c  5.3 % (4-6)   07/11/21  02:00    


 


Calcium  7.6 mg/dL (8.4-10.2)  L  07/19/21  03:49    


 


Phosphorus  2.60 mg/dL (2.5-4.5)   07/17/21  06:10    


 


Magnesium  1.60 mg/dL (1.7-2.3)  L  07/19/21  03:49    


 


Total Bilirubin  0.60 mg/dL (0.1-1.2)   07/15/21  04:31    


 


AST  96 units/L (5-40)  H  07/15/21  04:31    


 


ALT  274 units/L (7-56)  H  07/15/21  04:31    


 


Alkaline Phosphatase  92 units/L ()   07/15/21  04:31    


 


Serum Total Protein  5.4 g/dL (6.1-8.1)  L  07/14/21  04:32    


 


Total Protein  5.1 g/dL (6.3-8.2)  L  07/15/21  04:31    


 


Albumin  3.0 g/dL (3.9-5)  L  07/15/21  04:31    


 


Albumin/Globulin Ratio  1.4 %  07/15/21  04:31    


 


Alpha-1-Globulins  0.4 g/dL (0.2-0.3)  H  07/14/21  04:32    


 


Alpha-2-Globulins  0.5 g/dL (0.5-0.9)   07/14/21  04:32    


 


Beta Globulins  0.2 g/dL (0.2-0.5)   07/14/21  04:32    


 


Gamma Globulins  0.9 g/dL (0.8-1.7)   07/14/21  04:32    


 


Abnorm Protein Band 1  see below   07/14/21  04:32    


 


PEP Interpretation  see below  H  07/14/21  04:32    


 


Lipase  51 units/L (13-60)   07/10/21  22:43    


 


PTH Intact  176.3 pg/mL (15-65)  H  07/12/21  04:25    


 


Urine Color  Yellow  (Yellow)   07/10/21  Unknown


 


Urine Turbidity  Cloudy  (Clear)   07/10/21  Unknown


 


Urine pH  5.0  (5.0-7.0)   07/10/21  Unknown


 


Ur Specific Gravity  1.018  (1.003-1.030)   07/10/21  Unknown


 


Urine Protein  >500 mg/dL (Negative)   07/10/21  Unknown


 


Urine Glucose (UA)  50 mg/dL (Negative)   07/10/21  Unknown


 


Urine Ketones  Neg mg/dL (Negative)   07/10/21  Unknown


 


Urine Blood  Mod  (Negative)   07/10/21  Unknown


 


Urine Nitrite  Neg  (Negative)   07/10/21  Unknown


 


Urine Bilirubin  Neg  (Negative)   07/10/21  Unknown


 


Urine Urobilinogen  < 2.0 mg/dL (<2.0)   07/10/21  Unknown


 


Ur Leukocyte Esterase  Neg  (Negative)   07/10/21  Unknown


 


Urine WBC (Auto)  4.0 /HPF (0.0-6.0)   07/10/21  Unknown


 


Urine RBC (Auto)  10.0 /HPF (0.0-6.0)   07/10/21  Unknown


 


U Epithel Cells (Auto)  1.0 /HPF (0-13.0)   07/10/21  Unknown


 


Urine Mucus  Few /HPF  07/10/21  Unknown


 


Urine Creatinine  78.5 mg/dL (0.1-20.0)  H  07/11/21  15:40    


 


Protein/Creatinin Ratio  9.49   07/11/21  15:40    


 


Urine Sodium  76 mmol/L  07/11/21  15:40    


 


Urine Total Protein  745 mg/dL (5-11.8)  H  07/11/21  15:40    


 


STEVE Screen  Negative  (Negative)   07/14/21  04:32    


 


Proteinase 3 (PR3) Ab  <1.0 AI (<1.0)   07/14/21  04:32    


 


Myeloperoxidase Ab  <1.0 AI (<1.0)   07/14/21  04:32    


 


Coronavirus (PCR)  Negative  (Negative)   07/16/21  Unknown


 


Hepatitis A IgM Ab  Non-reactive  (NonReactive)   07/11/21  00:00    


 


Hep Bs Antigen  Non-reactive  (Negative)   07/11/21  00:00    


 


Hep B Core IgM Ab  Non-reactive  (NonReactive)   07/11/21  00:00    


 


Hepatitis C Antibody  Non-reactive  (NonReactive)   07/11/21  00:00    


 


Blood Type  O POSITIVE   07/12/21  10:53    


 


Antibody Screen  Negative   07/12/21  10:53    


 


Crossmatch  See Detail   07/12/21  10:53    











Malave/IV: 


                                        





Voiding Method                   Toilet











Active Medications





- Current Medications


Current Medications: 














Generic Name Dose Route Start Last Admin





  Trade Name Freq  PRN Reason Stop Dose Admin


 


Acetaminophen  650 mg  07/11/21 02:17 





  Acetaminophen 325 Mg Tab  PO  





  Q6H PRN  





  Pain MILD(1-3)/Fever >100.5/HA  


 


Amlodipine Besylate  10 mg  07/12/21 13:00  07/18/21 09:18





  Amlodipine 10 Mg Tab  PO   10 mg





  QDAY [facility]   Administration


 


Aspirin  81 mg  07/12/21 10:00  07/18/21 09:18





  Aspirin Ec 81 Mg Tab  PO   81 mg





  QDAY [facility]   Administration


 


Calcium Acetate  667 mg  07/13/21 12:00  07/19/21 09:12





  Calcium Acetate 667 Mg Cap  PO   Not Given





  TIDWM [facility]  


 


Carvedilol  12.5 mg  07/11/21 22:00  07/18/21 22:06





  Carvedilol 12.5 Mg Tab  PO   12.5 mg





  BID [facility]OT[facility]   Administration


 


Dextrose  0 ml  07/11/21 02:17 





  Dextrose 50% In Water (25gm) 50 Ml Syringe  IV  





  Q30MIN PRN  





  Hypoglycemia  





  Protocol  


 


Furosemide  80 mg  07/14/21 10:00  07/18/21 09:18





  Furosemide 40 Mg Tab  PO   80 mg





  QDAY [facility]   Administration


 


Hydralazine HCl  10 mg  07/11/21 06:13  07/11/21 10:10





  Hydralazine 20 Mg/1 Ml Inj  IV   10 mg





  Q4H PRN   Administration





  Blood Pressure  


 


Sodium Chloride  100 mls @ 999 mls/hr  07/12/21 10:00 





  Nacl 0.9%  IV  





  LIBIA PRN  





  Hypotension  


 


Insulin Human Regular  0 units  07/11/21 07:30  07/19/21 09:11





  Insulin Regular, Human 100 Units/1 Ml  SUB-Q   Not Given





  ACHS [facility]  





  Protocol  


 


Isosorbide Mononitrate  30 mg  07/14/21 15:00  07/18/21 09:18





  Isosorbide Mononitrate Er 30 Mg Tab  PO   30 mg





  QDAY [facility]   Administration


 


Magnesium Hydroxide  30 ml  07/11/21 02:17 





  Magnesium Hydroxide (Mom) Oral Liqd Udc  PO  





  Q4H PRN  





  Constipation  


 


Magnesium Oxide  400 mg  07/19/21 10:00 





  Magnesium Oxide 400 Mg Tab  PO  





  BID [facility]  


 


Nitroglycerin  0.4 mg  07/13/21 06:00  07/19/21 09:11





  Nitroglycerin 0.4 Mg Patch 24hr  TD   Not Given





  QDAY@0600 SOTERO  


 


Ondansetron HCl  4 mg  07/11/21 02:17  07/13/21 16:38





  Ondansetron 4 Mg/2 Ml Inj  IV   4 mg





  Q8H PRN   Administration





  Nausea And Vomiting  


 


Pantoprazole Sodium  40 mg  07/17/21 16:30  07/18/21 17:44





  Pantoprazole 40 Mg Tab  PO   40 mg





  BIDAC [facility]   Administration


 


Potassium Chloride  40 meq  07/19/21 09:00 





  Potassium Chloride Er 20 Meq Tab  PO  07/19/21 12:30 





  ONCE SOTERO  


 


Sodium Chloride  10 ml  07/11/21 10:00  07/18/21 22:07





  Sodium Chloride 0.9% 10 Ml Flush Syringe  IV   10 ml





  BID [facility]   Administration


 


Sodium Chloride  10 ml  07/11/21 02:17 





  Sodium Chloride 0.9% 10 Ml Flush Syringe  IV  





  PRN PRN  





  LINE FLUSH  














Nutrition/Malnutrition Assess





- Dietary Evaluation


Nutrition/Malnutrition Findings: 


                                        





Nutrition Notes                                            Start:  07/11/21 

12:02


Freq:                                                      Status: Active       




Protocol:                                                                       




 Document     07/16/21 11:16    (Rec: 07/16/21 11:19    [id number])


 Nutrition Notes


     Initial or Follow up                        Reassessment


     Current Diagnosis                           Acute Kidney Injury,Diabetes,


                                                 Hypertension,Heart Failure,


                                                 Hyperlipidemia


     Other Pertinent Diagnosis                   elevated LFTs, GIB, anemia


     Current Diet                                Cardiac, Consistent CHO


     Labs/Tests                                  Reviewed


     Pertinent Medications                       Reviewed


     Height                                      5 ft 5 in


     Weight                                      46.1 kg


     Ideal Body Weight (kg)                      61.81


     BMI                                         16.9


     Weight Status                               Underweight


     Subjective/Other Information                FU for intakes. Per RN pt


                                                 eating 90% of meals and 90% of


                                                 ONS.


     Percent of energy/protein needs met:        100%/100%


     Burn                                        Absent


     Trauma                                      Absent


     GI Symptoms                                 None


     Current % PO                                Good (%)


     Minimum of two criteria                     Yes


     Energy Intake (non-severe)                  <75% Estimated Energy


                                                 Requirement >7 days


     Muscle Mass                                 Mild Depletion (non-severe)


     #1


      Nutrition Diagnosis                        Malnutrition


      As Evidenced by Signs and Symptoms         pt eaing 90% of meals


      Diagnosis Progress(for reassessment        Continues


       documentation)                            


     Is patient on ventilator?                   No


     Is Patient Ambulatory and/or Out of Bed     No


     REE-(Humphrey-St. Jeor-confined to bed)      [phone]


     Calculation Used for Recommendations        Humphrey-St or


     Additional Notes                            Protein: (1.2-1.5g/kg) 62-77g


                                                 Fluid: 1 ml/kcal or per MD


 Nutrition Intervention


     Change Diet Order:                          Continue


     Add Supplement/Snack (indicate name/kcal    Nepro BID


      /protein )                                 


     Provides kCal:                              850


     Provides Protein (gm)                       38


     Goal #1                                     Meet at least 80% of protein


                                                 and energy needs via PO and


                                                 ONS intakes


     Goal #2                                     Weight gain/maintenance


     Anticipated Discharge Needs:                Cardiac, Consistent CHO


     Follow-Up By:                               07/20/21


     Additional Comments                         FU for intakes, ONS tolerance

## 2021-07-19 NOTE — PROGRESS NOTE
Assessment and Plan


Patient is sleeping, weak, and in no acute respiratory distress at rest. Resting

on room air, SaO2 is 97%. Patient admitted with nausea vomiting and abdominal 

pain. Abdominal pain is better now. Patient has history of Hypertension, asthma 

and diabetes and Aortic dissection. Patient has history of smoking 1 pack x 

40years. Says stopped smoking approximately 30 years ago. Denies alcohol or drug

abuse.Patient worked as teacher,  and worked in warehouse. Patient 

. Has four children. No known drug allergies.


Patient afebrile. No leukocytosis.


CXR done on 7/15/21 reported: Mild increased pulmonary vascularity with mild jermaine

ma appears improved. No 


pneumothorax.  


Patient is on lasix and protonix. 


Recommend SCDs.








Talked to the pt's family and explained the pt's respiratory status. 





- Patient Problems


(1) Acute kidney injury superimposed on chronic kidney disease


Current Visit: Yes   Status: Acute   


Plan to address problem: 


Management as per nephrology.








(2) CHF (congestive heart failure), NYHA class III


Current Visit: Yes   Status: Acute   


Plan to address problem: 


Management as per Cardioogy.








(3) GI bleed


Current Visit: Yes   Status: Acute   


Plan to address problem: 


Management as per gastroenterology.








(4) Hypertensive urgency


Current Visit: Yes   Status: Acute   


Plan to address problem: 


Management as per primary care.








(5) Pulmonary edema


Current Visit: Yes   Status: Acute   


Plan to address problem: 


Patient is on lasix and dialysis.








Subjective


Date of service: 07/19/21


Principal diagnosis: Anemia, Abnormal LFTs


Interval history: 





Patient is sleeping, weak, and in no acute respiratory distress at rest. Resting

on room air, SaO2 is 97%. Patient admitted with nausea vomiting and abdominal 

pain. Abdominal pain is better now. Patient has history of Hypertension, asthma 

and diabetes and Aortic dissection. Patient has history of smoking 1 pack x 

40years. Says stopped smoking approximately 30 years ago. Denies alcohol or drug

abuse.Patient worked as teacher,  and worked in warehouse. Patient 

. Has four children. No known drug allergies.


Patient afebrile. No leukocytosis.


CXR done on 7/15/21 reported: Mild increased pulmonary vascularity with mild 

edema appears improved. No 


pneumothorax.  


Patient is on lasix and protonix. 


Recommend SCDs. 





Objective


                               Vital Signs - 12hr











  07/19/21 07/19/21 07/19/21





  03:33 07:28 07:54


 


Temperature 98.8 F 97.6 F 


 


Pulse Rate 72  


 


Respiratory 16 16 





Rate   


 


Blood Pressure 113/67 125/78 


 


O2 Sat by Pulse 97  97





Oximetry   














  07/19/21 07/19/21 07/19/21





  09:01 09:07 10:00


 


Temperature   


 


Pulse Rate  72 74


 


Respiratory   





Rate   


 


Blood Pressure 130/74 130/74 


 


O2 Sat by Pulse   





Oximetry   














  07/19/21 07/19/21 07/19/21





  10:31 10:33 10:34


 


Temperature   


 


Pulse Rate  75 78


 


Respiratory   





Rate   


 


Blood Pressure 125/62 130/74 125/62


 


O2 Sat by Pulse   





Oximetry   














  07/19/21 07/19/21 07/19/21





  10:35 10:36 10:37


 


Temperature   


 


Pulse Rate 76 77 78


 


Respiratory   





Rate   


 


Blood Pressure 116/61 121/60 113/58


 


O2 Sat by Pulse   





Oximetry   














  07/19/21 07/19/21





  10:38 11:54


 


Temperature  97.5 F L


 


Pulse Rate 76 75


 


Respiratory  16





Rate  


 


Blood Pressure 113/59 133/71


 


O2 Sat by Pulse  97





Oximetry  











Constitutional: no acute distress, alert, other (Weak.)


Eyes: non-icteric


ENT: oropharynx moist


Neck: supple, no lymphadenopathy


Ascultation: Bilateral: diminished breath sounds


Cardiovascular: regular rate and rhythm


Gastrointestinal: normoactive bowel sounds, soft, non-tender


Integumentary: normal


Extremities: no cyanosis, no edema


Neurologic: non-focal exam, pupils equal and round


Psychiatric: depressed


CBC and BMP: 


                                 07/15/21 04:31





                                 07/19/21 03:49


ABG, PT/INR, D-dimer: 


ABG











ABG pH  7.384 pH Units (7.350-7.450)   07/11/21  14:40    


 


ABG pCO2  32.3 mm Hg  07/11/21  14:40    


 


ABG pO2  107.8 mm Hg (80.0-90.0)  H  07/11/21  14:40    


 


ABG O2 Saturation  97.9 % (95.0-99.0)   07/11/21  14:40    





PT/INR, D-dimer











PT  18.4 Sec. (12.2-14.9)  H  07/13/21  19:20    


 


INR  1.48  (0.87-1.13)  H  07/13/21  19:20    








Abnormal lab findings: 


                                  Abnormal Labs











  07/10/21 07/10/21 07/11/21





  22:43 22:43 04:28


 


RBC   


 


Hgb  8.5 L  


 


Hct  26.1 L  


 


MCV  65 L  


 


MCH  21 L  


 


RDW  18.4 H  


 


Plt Count   


 


Mono % (Auto)   


 


Mono # (Auto)   


 


Seg Neuts % (Manual)  80.0 H  


 


Lymphocytes % (Manual)  13.0 L  


 


Nucleated RBC %   


 


Lymphocytes # (Manual)  0.9 L  


 


PT   


 


INR   


 


ABG pO2   


 


ABG HCO3   


 


ABG Base Excess   


 


ABG Hemoglobin   


 


Sodium   


 


Potassium   5.9 H  5.2 H


 


Chloride   


 


Carbon Dioxide   14 L  20 L


 


BUN   72 H  72 H


 


Creatinine   6.8 H  6.5 H


 


Glucose   152 H 


 


POC Glucose   


 


Calcium   


 


Phosphorus   


 


Magnesium   


 


Total Bilirubin   1.50 H  1.40 H


 


AST   517 H  604 H


 


ALT   448 H  491 H


 


Serum Total Protein   


 


Total Protein    6.2 L


 


Albumin   3.8 L  3.8 L


 


Alpha-1-Globulins   


 


PEP Interpretation   


 


PTH Intact   


 


Urine Creatinine   


 


Urine Total Protein   


 


Complement C3   


 


Crossmatch   














  07/11/21 07/11/21 07/11/21





  04:32 08:23 08:23


 


RBC   


 


Hgb   7.8 L 


 


Hct   24.9 L 


 


MCV   66 L 


 


MCH   21 L 


 


RDW   18.0 H 


 


Plt Count   98 L 


 


Mono % (Auto)   


 


Mono # (Auto)   


 


Seg Neuts % (Manual)   


 


Lymphocytes % (Manual)   1.0 L 


 


Nucleated RBC %   


 


Lymphocytes # (Manual)   0.1 L 


 


PT   


 


INR   


 


ABG pO2   


 


ABG HCO3   


 


ABG Base Excess   


 


ABG Hemoglobin   


 


Sodium   


 


Potassium    5.7 H


 


Chloride   


 


Carbon Dioxide    14 L


 


BUN    77 H


 


Creatinine    6.7 H


 


Glucose    126 H


 


POC Glucose  114 H  


 


Calcium   


 


Phosphorus   


 


Magnesium   


 


Total Bilirubin   


 


AST   


 


ALT   


 


Serum Total Protein   


 


Total Protein   


 


Albumin   


 


Alpha-1-Globulins   


 


PEP Interpretation   


 


PTH Intact   


 


Urine Creatinine   


 


Urine Total Protein   


 


Complement C3   


 


Crossmatch   














  07/11/21 07/11/21 07/11/21





  08:58 11:33 14:40


 


RBC   


 


Hgb   


 


Hct   


 


MCV   


 


MCH   


 


RDW   


 


Plt Count   


 


Mono % (Auto)   


 


Mono # (Auto)   


 


Seg Neuts % (Manual)   


 


Lymphocytes % (Manual)   


 


Nucleated RBC %   


 


Lymphocytes # (Manual)   


 


PT   


 


INR   


 


ABG pO2    107.8 H


 


ABG HCO3    18.8 L


 


ABG Base Excess    -5.6 L


 


ABG Hemoglobin    7.4 L


 


Sodium   


 


Potassium   


 


Chloride   


 


Carbon Dioxide   


 


BUN   


 


Creatinine   


 


Glucose   


 


POC Glucose  139 H  155 H 


 


Calcium   


 


Phosphorus   


 


Magnesium   


 


Total Bilirubin   


 


AST   


 


ALT   


 


Serum Total Protein   


 


Total Protein   


 


Albumin   


 


Alpha-1-Globulins   


 


PEP Interpretation   


 


PTH Intact   


 


Urine Creatinine   


 


Urine Total Protein   


 


Complement C3   


 


Crossmatch   














  07/11/21 07/11/21 07/11/21





  15:40 16:46 21:29


 


RBC   


 


Hgb   


 


Hct   


 


MCV   


 


MCH   


 


RDW   


 


Plt Count   


 


Mono % (Auto)   


 


Mono # (Auto)   


 


Seg Neuts % (Manual)   


 


Lymphocytes % (Manual)   


 


Nucleated RBC %   


 


Lymphocytes # (Manual)   


 


PT   


 


INR   


 


ABG pO2   


 


ABG HCO3   


 


ABG Base Excess   


 


ABG Hemoglobin   


 


Sodium   


 


Potassium   


 


Chloride   


 


Carbon Dioxide   


 


BUN   


 


Creatinine   


 


Glucose   


 


POC Glucose   158 H  138 H


 


Calcium   


 


Phosphorus   


 


Magnesium   


 


Total Bilirubin   


 


AST   


 


ALT   


 


Serum Total Protein   


 


Total Protein   


 


Albumin   


 


Alpha-1-Globulins   


 


PEP Interpretation   


 


PTH Intact   


 


Urine Creatinine  78.5 H  


 


Urine Total Protein  745 H  


 


Complement C3   


 


Crossmatch   














  07/12/21 07/12/21 07/12/21





  04:25 04:25 04:25


 


RBC  3.50 L  


 


Hgb  7.3 L  


 


Hct  22.8 L  


 


MCV  65 L  


 


MCH  21 L  


 


RDW  17.9 H  


 


Plt Count  88 L  


 


Mono % (Auto)   


 


Mono # (Auto)   


 


Seg Neuts % (Manual)  87.0 H  


 


Lymphocytes % (Manual)  5.0 L  


 


Nucleated RBC %  1.0 H  


 


Lymphocytes # (Manual)  0.4 L  


 


PT   


 


INR   


 


ABG pO2   


 


ABG HCO3   


 


ABG Base Excess   


 


ABG Hemoglobin   


 


Sodium   


 


Potassium   


 


Chloride   


 


Carbon Dioxide   20 L 


 


BUN   89 H 


 


Creatinine   7.6 H 


 


Glucose   129 H 


 


POC Glucose   


 


Calcium   


 


Phosphorus   8.20 H 


 


Magnesium   


 


Total Bilirubin   


 


AST   502 H 


 


ALT   636 H 


 


Serum Total Protein   


 


Total Protein   5.7 L 


 


Albumin   3.2 L 


 


Alpha-1-Globulins   


 


PEP Interpretation   


 


PTH Intact    176.3 H


 


Urine Creatinine   


 


Urine Total Protein   


 


Complement C3   


 


Crossmatch   














  07/12/21 07/12/21 07/12/21





  07:21 10:53 11:11


 


RBC   


 


Hgb   


 


Hct   


 


MCV   


 


MCH   


 


RDW   


 


Plt Count   


 


Mono % (Auto)   


 


Mono # (Auto)   


 


Seg Neuts % (Manual)   


 


Lymphocytes % (Manual)   


 


Nucleated RBC %   


 


Lymphocytes # (Manual)   


 


PT   


 


INR   


 


ABG pO2   


 


ABG HCO3   


 


ABG Base Excess   


 


ABG Hemoglobin   


 


Sodium   


 


Potassium   


 


Chloride   


 


Carbon Dioxide   


 


BUN   


 


Creatinine   


 


Glucose   


 


POC Glucose  120 H   120 H


 


Calcium   


 


Phosphorus   


 


Magnesium   


 


Total Bilirubin   


 


AST   


 


ALT   


 


Serum Total Protein   


 


Total Protein   


 


Albumin   


 


Alpha-1-Globulins   


 


PEP Interpretation   


 


PTH Intact   


 


Urine Creatinine   


 


Urine Total Protein   


 


Complement C3   


 


Crossmatch   See Detail 














  07/12/21 07/12/21 07/13/21





  16:28 21:07 05:32


 


RBC   


 


Hgb    7.9 L


 


Hct    23.8 L


 


MCV    65 L


 


MCH    22 L


 


RDW    17.6 H


 


Plt Count    94 L


 


Mono % (Auto)   


 


Mono # (Auto)   


 


Seg Neuts % (Manual)   


 


Lymphocytes % (Manual)   


 


Nucleated RBC %   


 


Lymphocytes # (Manual)   


 


PT   


 


INR   


 


ABG pO2   


 


ABG HCO3   


 


ABG Base Excess   


 


ABG Hemoglobin   


 


Sodium   


 


Potassium   


 


Chloride   


 


Carbon Dioxide   


 


BUN   


 


Creatinine   


 


Glucose   


 


POC Glucose  146 H  167 H 


 


Calcium   


 


Phosphorus   


 


Magnesium   


 


Total Bilirubin   


 


AST   


 


ALT   


 


Serum Total Protein   


 


Total Protein   


 


Albumin   


 


Alpha-1-Globulins   


 


PEP Interpretation   


 


PTH Intact   


 


Urine Creatinine   


 


Urine Total Protein   


 


Complement C3   


 


Crossmatch   














  07/13/21 07/13/21 07/13/21





  05:32 15:10 19:20


 


RBC   


 


Hgb   


 


Hct   


 


MCV   


 


MCH   


 


RDW   


 


Plt Count   


 


Mono % (Auto)   


 


Mono # (Auto)   


 


Seg Neuts % (Manual)   


 


Lymphocytes % (Manual)   


 


Nucleated RBC %   


 


Lymphocytes # (Manual)   


 


PT    18.4 H


 


INR    1.48 H


 


ABG pO2   


 


ABG HCO3   


 


ABG Base Excess   


 


ABG Hemoglobin   


 


Sodium   


 


Potassium   


 


Chloride   


 


Carbon Dioxide   


 


BUN  67 H  


 


Creatinine  5.8 H  


 


Glucose  106 H  


 


POC Glucose   137 H 


 


Calcium  8.3 L  


 


Phosphorus  6.50 H D  


 


Magnesium   


 


Total Bilirubin   


 


AST  207 H  


 


ALT  402 H  


 


Serum Total Protein   


 


Total Protein  5.1 L  


 


Albumin  3.0 L  


 


Alpha-1-Globulins   


 


PEP Interpretation   


 


PTH Intact   


 


Urine Creatinine   


 


Urine Total Protein   


 


Complement C3   


 


Crossmatch   














  07/13/21 07/14/21 07/14/21





  20:55 04:32 04:32


 


RBC   


 


Hgb   


 


Hct   


 


MCV   


 


MCH   


 


RDW   


 


Plt Count   


 


Mono % (Auto)   


 


Mono # (Auto)   


 


Seg Neuts % (Manual)   


 


Lymphocytes % (Manual)   


 


Nucleated RBC %   


 


Lymphocytes # (Manual)   


 


PT   


 


INR   


 


ABG pO2   


 


ABG HCO3   


 


ABG Base Excess   


 


ABG Hemoglobin   


 


Sodium   


 


Potassium   


 


Chloride   


 


Carbon Dioxide   


 


BUN   


 


Creatinine   


 


Glucose   


 


POC Glucose  195 H  


 


Calcium   


 


Phosphorus   


 


Magnesium   


 


Total Bilirubin   


 


AST   


 


ALT   


 


Serum Total Protein    5.4 L


 


Total Protein   


 


Albumin    3.0 L


 


Alpha-1-Globulins    0.4 H


 


PEP Interpretation    see below H


 


PTH Intact   


 


Urine Creatinine   


 


Urine Total Protein   


 


Complement C3   68 L 


 


Crossmatch   














  07/14/21 07/14/21 07/14/21





  08:57 11:55 15:50


 


RBC   


 


Hgb   


 


Hct   


 


MCV   


 


MCH   


 


RDW   


 


Plt Count   


 


Mono % (Auto)   


 


Mono # (Auto)   


 


Seg Neuts % (Manual)   


 


Lymphocytes % (Manual)   


 


Nucleated RBC %   


 


Lymphocytes # (Manual)   


 


PT   


 


INR   


 


ABG pO2   


 


ABG HCO3   


 


ABG Base Excess   


 


ABG Hemoglobin   


 


Sodium   


 


Potassium   


 


Chloride   


 


Carbon Dioxide   


 


BUN   


 


Creatinine   


 


Glucose   


 


POC Glucose  164 H  189 H  173 H


 


Calcium   


 


Phosphorus   


 


Magnesium   


 


Total Bilirubin   


 


AST   


 


ALT   


 


Serum Total Protein   


 


Total Protein   


 


Albumin   


 


Alpha-1-Globulins   


 


PEP Interpretation   


 


PTH Intact   


 


Urine Creatinine   


 


Urine Total Protein   


 


Complement C3   


 


Crossmatch   














  07/14/21 07/15/21 07/15/21





  20:38 04:31 04:31


 


RBC    3.52 L


 


Hgb    7.5 L


 


Hct    23.5 L


 


MCV    67 L


 


MCH    21 L


 


RDW    18.3 H


 


Plt Count    123 L


 


Mono % (Auto)    13.8 H


 


Mono # (Auto)    1.0 H


 


Seg Neuts % (Manual)   


 


Lymphocytes % (Manual)   


 


Nucleated RBC %   


 


Lymphocytes # (Manual)   


 


PT   


 


INR   


 


ABG pO2   


 


ABG HCO3   


 


ABG Base Excess   


 


ABG Hemoglobin   


 


Sodium   136 L 


 


Potassium   


 


Chloride   97.0 L 


 


Carbon Dioxide   


 


BUN   59 H 


 


Creatinine   6.4 H 


 


Glucose   121 H 


 


POC Glucose  147 H  


 


Calcium   8.1 L 


 


Phosphorus   


 


Magnesium   


 


Total Bilirubin   


 


AST   96 H 


 


ALT   274 H 


 


Serum Total Protein   


 


Total Protein   5.1 L 


 


Albumin   3.0 L 


 


Alpha-1-Globulins   


 


PEP Interpretation   


 


PTH Intact   


 


Urine Creatinine   


 


Urine Total Protein   


 


Complement C3   


 


Crossmatch   














  07/15/21 07/15/21 07/16/21





  07:53 21:07 08:20


 


RBC   


 


Hgb   


 


Hct   


 


MCV   


 


MCH   


 


RDW   


 


Plt Count   


 


Mono % (Auto)   


 


Mono # (Auto)   


 


Seg Neuts % (Manual)   


 


Lymphocytes % (Manual)   


 


Nucleated RBC %   


 


Lymphocytes # (Manual)   


 


PT   


 


INR   


 


ABG pO2   


 


ABG HCO3   


 


ABG Base Excess   


 


ABG Hemoglobin   


 


Sodium   


 


Potassium   


 


Chloride   


 


Carbon Dioxide   


 


BUN   


 


Creatinine   


 


Glucose   


 


POC Glucose  143 H  116 H  110 H


 


Calcium   


 


Phosphorus   


 


Magnesium   


 


Total Bilirubin   


 


AST   


 


ALT   


 


Serum Total Protein   


 


Total Protein   


 


Albumin   


 


Alpha-1-Globulins   


 


PEP Interpretation   


 


PTH Intact   


 


Urine Creatinine   


 


Urine Total Protein   


 


Complement C3   


 


Crossmatch   














  07/16/21 07/16/21 07/17/21





  12:16 20:07 06:10


 


RBC   


 


Hgb   


 


Hct   


 


MCV   


 


MCH   


 


RDW   


 


Plt Count   


 


Mono % (Auto)   


 


Mono # (Auto)   


 


Seg Neuts % (Manual)   


 


Lymphocytes % (Manual)   


 


Nucleated RBC %   


 


Lymphocytes # (Manual)   


 


PT   


 


INR   


 


ABG pO2   


 


ABG HCO3   


 


ABG Base Excess   


 


ABG Hemoglobin   


 


Sodium    135 L


 


Potassium   


 


Chloride    97.8 L


 


Carbon Dioxide   


 


BUN    62 H


 


Creatinine    6.0 H


 


Glucose    117 H


 


POC Glucose  248 H  167 H 


 


Calcium    7.9 L


 


Phosphorus   


 


Magnesium   


 


Total Bilirubin   


 


AST   


 


ALT   


 


Serum Total Protein   


 


Total Protein   


 


Albumin   


 


Alpha-1-Globulins   


 


PEP Interpretation   


 


PTH Intact   


 


Urine Creatinine   


 


Urine Total Protein   


 


Complement C3   


 


Crossmatch   














  07/17/21 07/17/21 07/17/21





  08:03 11:51 21:49


 


RBC   


 


Hgb   


 


Hct   


 


MCV   


 


MCH   


 


RDW   


 


Plt Count   


 


Mono % (Auto)   


 


Mono # (Auto)   


 


Seg Neuts % (Manual)   


 


Lymphocytes % (Manual)   


 


Nucleated RBC %   


 


Lymphocytes # (Manual)   


 


PT   


 


INR   


 


ABG pO2   


 


ABG HCO3   


 


ABG Base Excess   


 


ABG Hemoglobin   


 


Sodium   


 


Potassium   


 


Chloride   


 


Carbon Dioxide   


 


BUN   


 


Creatinine   


 


Glucose   


 


POC Glucose  121 H  202 H  194 H


 


Calcium   


 


Phosphorus   


 


Magnesium   


 


Total Bilirubin   


 


AST   


 


ALT   


 


Serum Total Protein   


 


Total Protein   


 


Albumin   


 


Alpha-1-Globulins   


 


PEP Interpretation   


 


PTH Intact   


 


Urine Creatinine   


 


Urine Total Protein   


 


Complement C3   


 


Crossmatch   














  07/18/21 07/18/21 07/18/21





  11:45 16:06 20:53


 


RBC   


 


Hgb   


 


Hct   


 


MCV   


 


MCH   


 


RDW   


 


Plt Count   


 


Mono % (Auto)   


 


Mono # (Auto)   


 


Seg Neuts % (Manual)   


 


Lymphocytes % (Manual)   


 


Nucleated RBC %   


 


Lymphocytes # (Manual)   


 


PT   


 


INR   


 


ABG pO2   


 


ABG HCO3   


 


ABG Base Excess   


 


ABG Hemoglobin   


 


Sodium   


 


Potassium   


 


Chloride   


 


Carbon Dioxide   


 


BUN   


 


Creatinine   


 


Glucose   


 


POC Glucose  191 H  143 H  148 H


 


Calcium   


 


Phosphorus   


 


Magnesium   


 


Total Bilirubin   


 


AST   


 


ALT   


 


Serum Total Protein   


 


Total Protein   


 


Albumin   


 


Alpha-1-Globulins   


 


PEP Interpretation   


 


PTH Intact   


 


Urine Creatinine   


 


Urine Total Protein   


 


Complement C3   


 


Crossmatch   














  07/19/21 07/19/21





  03:49 11:53


 


RBC  


 


Hgb  


 


Hct  


 


MCV  


 


MCH  


 


RDW  


 


Plt Count  


 


Mono % (Auto)  


 


Mono # (Auto)  


 


Seg Neuts % (Manual)  


 


Lymphocytes % (Manual)  


 


Nucleated RBC %  


 


Lymphocytes # (Manual)  


 


PT  


 


INR  


 


ABG pO2  


 


ABG HCO3  


 


ABG Base Excess  


 


ABG Hemoglobin  


 


Sodium  134 L 


 


Potassium  3.5 L 


 


Chloride  96.0 L 


 


Carbon Dioxide  


 


BUN  54 H 


 


Creatinine  5.0 H 


 


Glucose  


 


POC Glucose   156 H


 


Calcium  7.6 L 


 


Phosphorus  


 


Magnesium  1.60 L 


 


Total Bilirubin  


 


AST  


 


ALT  


 


Serum Total Protein  


 


Total Protein  


 


Albumin  


 


Alpha-1-Globulins  


 


PEP Interpretation  


 


PTH Intact  


 


Urine Creatinine  


 


Urine Total Protein  


 


Complement C3  


 


Crossmatch  











Chest x-ray: report reviewed, image reviewed


Additional Studies: 





CHEST 1 VIEW 7/15/2021 8:03 AM  


 


 INDICATION / CLINICAL INFORMATION: Follow up on pulmonary edema..  


 


 COMPARISON: 7/12/2021  


 


 FINDINGS:  


 


 SUPPORT DEVICES: Left Vas-Cath has been placed the tip overlying the distal 

SVC. Right jugular line


tip overlies the distal SVC  


 HEART / MEDIASTINUM: Tortuosity of the thoracic aorta is unchanged   


 LUNGS / PLEURA: Mild increased pulmonary vascularity with mild edema appears 

improved. No 


pneumothorax.   





Allied health notes reviewed: nursing

## 2021-07-19 NOTE — PROGRESS NOTE
Assessment and Plan





- Patient Problems


(1) Abnormal ECG


Current Visit: Yes   Status: Acute   


Plan to address problem: 


As previously reported, the ECG showed marked T wave abnormalities across the 

precordium, more pronounced than his baseline revascularization abnormalities of

LVH.  He has no prior history of coronary artery disease, but has had 

thoracotomy for repair of an ascending aortic aneurysm.  





Due to abnormal ECG, we have completed a Lexiscan thallium, results are pending.








(2) Preoperative cardiovascular examination


Current Visit: Yes   Status: Acute   





Subjective


Date of service: 07/19/21


Principal diagnosis: Anemia, Abnormal LFTs


Interval history: 





Patient is comfortable, he has completed a Lexiscan thallium stress test, 

myocardial perfusion images are pending.





Objective


                                   Vital Signs











  Temp Pulse Resp Resp BP Pulse Ox


 


 07/19/21 07:54       97


 


 07/19/21 03:33  98.8 F  72  16   113/67  97


 


 07/18/21 23:14  98.8 F  73  16   106/57  97


 


 07/18/21 22:06   71    100/56 


 


 07/18/21 22:00   71   20  


 


 07/18/21 20:34       95


 


 07/18/21 19:42  98.3 F  71  19   100/56  96














- Physical Examination


General: No Apparent Distress


HEENT: Positive: PERRL


Neck: Positive: neck supple


Cardiac: Positive: Reg Rate and Rhythm


Lungs: Positive: Decreased Breath Sounds


Neuro: Positive: Weakness (Generalized weakness, frail and chronically ill 

appearing)


Abdomen: Positive: Soft


Skin: Positive: Clear


Extremities: Absent: normal





- Labs and Meds


                          Comprehensive Metabolic Panel











  07/19/21 Range/Units





  03:49 


 


Sodium  134 L  (137-145)  mmol/L


 


Potassium  3.5 L  (3.6-5.0)  mmol/L


 


Chloride  96.0 L  ()  mmol/L


 


Carbon Dioxide  28  (22-30)  mmol/L


 


BUN  54 H  (9-20)  mg/dL


 


Creatinine  5.0 H  (0.8-1.3)  mg/dL


 


Glucose  95  ()  mg/dL


 


Calcium  7.6 L  (8.4-10.2)  mg/dL














- Allied health notes


Allied health notes reviewed: nursing

## 2021-07-19 NOTE — PROGRESS NOTE
Assessment and Plan


1. 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


Suspect ESRD.


CT abdomen negative for hydro.


Monitor renal function.


Renal prognosis is guarded to poor.


Avoid nephrotoxic agents.


Meds dosage based on GFR.


Patient was started on hemodialysis due to significant decline in the GFR, 

associated hyperkalemia, metabolic acidosis and volume overload.


Hemodialysis: 7/12, 7/13, 7/15, 7/17.





2. FEN:


Hyperkalemia, improved, monitor.


Anion-gap Metabolic acidosis, s/p HD, monitor.


Volume overload, improved with HD.


Monitor lytes.





3. Heavy proteinuria:


?etiology.


STEVE and ANCA are negative.


SPEP negative for M-spike.


GBM Ab and Complements are pending.





4. Decompensated CHF:


Echo EF 45-50%.


Strict I/O.


On Carvedilol.


Followed by Cards.





5. Elevated liver enzymes:


Hepatitis serologies negative.


Improving.





6. GI bleed:


Per GI EGD after clearance from Cards.





7. Hypertensive urgency:


BP controlled.


UF with HD as tolerated.


Adjust meds as needed.


Monitor.





8. Hypochromic Anemia, POA:


?2/2 GI bleed.


Followed by GI.


Epogen.


Monitor.





9. Thrombocytopenia.





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





Await outpatient hemodialysis chair.











Subjective:


Patient was seen and examined at the bedside.











Examination:


General appearance: well-developed, thin built, appears stated age, not in 

distress


HEENT: ATNC, pupils equal


Neck: supple


Respiratory: bibasal diminished breath sounds


Cardiology: regular, S1S2, no murmur


Gastrointestinal: soft, bowel sounds heard, not tender


Integumentary: warm and dry, upper chest hypopigmented patches noted


Neurologic: alert, moving extremities


Ext: no edema


Hemodialysis access: L Tunnel catheter








Subjective


Date of service: 07/19/21


Principal diagnosis: Anemia, Abnormal LFTs





Objective





- Vital Signs


Vital signs: 


                               Vital Signs - 12hr











  07/18/21 07/19/21 07/19/21





  23:14 03:33 07:54


 


Temperature 98.8 F 98.8 F 


 


Pulse Rate 73 72 


 


Respiratory 16 16 





Rate   


 


Blood Pressure 106/57 113/67 


 


O2 Sat by Pulse 97 97 97





Oximetry   














- Lab





                                 07/15/21 04:31





                                 07/19/21 03:49


                             Most recent lab results











ABG pH  7.384 pH Units (7.350-7.450)   07/11/21  14:40    


 


ABG pCO2  32.3 mm Hg  07/11/21  14:40    


 


ABG pO2  107.8 mm Hg (80.0-90.0)  H  07/11/21  14:40    


 


ABG HCO3  18.8 mmol/L (20.0-26.0)  L  07/11/21  14:40    


 


ABG O2 Saturation  97.9 % (95.0-99.0)   07/11/21  14:40    


 


Calcium  7.6 mg/dL (8.4-10.2)  L  07/19/21  03:49    


 


Phosphorus  2.60 mg/dL (2.5-4.5)   07/17/21  06:10    


 


Magnesium  1.60 mg/dL (1.7-2.3)  L  07/19/21  03:49    


 


Urine Creatinine  78.5 mg/dL (0.1-20.0)  H  07/11/21  15:40    


 


Urine Sodium  76 mmol/L  07/11/21  15:40    


 


Urine Total Protein  745 mg/dL (5-11.8)  H  07/11/21  15:40    














Medications & Allergies





- Medications


Allergies/Adverse Reactions: 


                                    Allergies





No Known Allergies Allergy (Unverified 01/17/15 01:13)


   








Home Medications: 


                                Home Medications











 Medication  Instructions  Recorded  Confirmed  Last Taken  Type


 


Amlodipine Besylate [Norvasc] 10 mg PO QDAY 07/10/21 07/10/21 Unknown History


 


Aspirin EC [Halfprin EC] 81 mg PO QDAY 07/10/21 07/10/21 Unknown History


 


Atorvastatin Calcium [Lipitor] 80 mg PO QDAY 07/10/21 07/10/21 Unknown History


 


Lisinopril [Zestril] 10 mg PO QDAY 07/10/21 07/10/21 Unknown History











Active Medications: 














Generic Name Dose Route Start Last Admin





  Trade Name Freq  PRN Reason Stop Dose Admin


 


Acetaminophen  650 mg  07/11/21 02:17 





  Acetaminophen 325 Mg Tab  PO  





  Q6H PRN  





  Pain MILD(1-3)/Fever >100.5/HA  


 


Amlodipine Besylate  10 mg  07/12/21 13:00  07/18/21 09:18





  Amlodipine 10 Mg Tab  PO   10 mg





  QDAY SOTERO   Administration


 


Aspirin  81 mg  07/12/21 10:00  07/18/21 09:18





  Aspirin Ec 81 Mg Tab  PO   81 mg





  QDAY SOTERO   Administration


 


Calcium Acetate  667 mg  07/13/21 12:00  07/19/21 09:12





  Calcium Acetate 667 Mg Cap  PO   Not Given





  TIDWM [facility]  


 


Carvedilol  12.5 mg  07/11/21 22:00  07/18/21 22:06





  Carvedilol 12.5 Mg Tab  PO   12.5 mg





  BID SOTERO   Administration


 


Dextrose  0 ml  07/11/21 02:17 





  Dextrose 50% In Water (25gm) 50 Ml Syringe  IV  





  Q30MIN PRN  





  Hypoglycemia  





  Protocol  


 


Furosemide  80 mg  07/14/21 10:00  07/18/21 09:18





  Furosemide 40 Mg Tab  PO   80 mg





  QDAY [facility]   Administration


 


Hydralazine HCl  10 mg  07/11/21 06:13  07/11/21 10:10





  Hydralazine 20 Mg/1 Ml Inj  IV   10 mg





  Q4H PRN   Administration





  Blood Pressure  


 


Sodium Chloride  100 mls @ 999 mls/hr  07/12/21 10:00 





  Nacl 0.9%  IV  





  LIBIA PRN  





  Hypotension  


 


Insulin Human Regular  0 units  07/11/21 07:30  07/19/21 09:11





  Insulin Regular, Human 100 Units/1 Ml  SUB-Q   Not Given





  AC[facility]  





  Protocol  


 


Isosorbide Mononitrate  30 mg  07/14/21 15:00  07/18/21 09:18





  Isosorbide Mononitrate Er 30 Mg Tab  PO   30 mg





  QDAY [facility]   Administration


 


Magnesium Hydroxide  30 ml  07/11/21 02:17 





  Magnesium Hydroxide (Mom) Oral Liqd Udc  PO  





  Q4H PRN  





  Constipation  


 


Magnesium Oxide  400 mg  07/19/21 10:00 





  Magnesium Oxide 400 Mg Tab  PO  





  BID [facility]  


 


Nitroglycerin  0.4 mg  07/13/21 06:00  07/19/21 09:11





  Nitroglycerin 0.4 Mg Patch 24hr  TD   Not Given





  QDAY@0600 [facility]  


 


Ondansetron HCl  4 mg  07/11/21 02:17  07/13/21 16:38





  Ondansetron 4 Mg/2 Ml Inj  IV   4 mg





  Q8H PRN   Administration





  Nausea And Vomiting  


 


Pantoprazole Sodium  40 mg  07/17/21 16:30  07/18/21 17:44





  Pantoprazole 40 Mg Tab  PO   40 mg





  BIDAC [facility]   Administration


 


Potassium Chloride  40 meq  07/19/21 09:00 





  Potassium Chloride Er 20 Meq Tab  PO  07/19/21 12:30 





  ONCE SOTERO  


 


Sodium Chloride  10 ml  07/11/21 10:00  07/18/21 22:07





  Sodium Chloride 0.9% 10 Ml Flush Syringe  IV   10 ml





  BID SOTERO   Administration


 


Sodium Chloride  10 ml  07/11/21 02:17 





  Sodium Chloride 0.9% 10 Ml Flush Syringe  IV  





  PRN PRN  





  LINE FLUSH

## 2021-07-19 NOTE — NUCLEAR MEDICINE REPORT
--------------- APPROVED REPORT --------------





Exam: Nuclear Stress Test

Indication: Chest pain



BMI:  0



Stress Test Details

HR

Max Heart Rate (APMHR): 140 bpm 

Target HR (85% APMHR): 119 bpm



BP

ECG

Resting ECG:  Sinus Rhythm

Stress ECG:     Sinus Rhythm

ST Change: Nondiagnostic resting ST abnormalities

Arrhythmia:    None

Recovery ECG: Sinus Rhythm, nonspecific ST-T abnormalities

Recovery ST Change: Nondiagnostic resting ST abnormalities

Recovery Arrhythmia: None



Stress ECG Conclusion

No chest pain with pharmacologic stress testing, ECG nondiagnostic due to

resting ST and T wave abnormalities, myocardial perfusion images are resting

for final test interpretation.                                                  













NM EXAM: Myocardial Perfusion REST/STRESS



Resting Data

Rest SPECT myocardial perfusion imaging was performed in supine position 45

minutes following the intravenous injection of 10 mCi of Tc-99m Myoview.        

Time of rest injection: 0700                                                    



Pharmacologic Stress

Pharmacologic stress test was performed by injecting Regadenoson 0.4 mg IV push

followed by the intravenous injection of 28 mCi of Tc-99m Myoview.              

Time of stress injection: 1000                                                  

Gated Stress SPECT was performed 45 minutes after stress injection.             

The images were gated to evaluate regional wall motion and calculate left

ventricular ejection fraction.                                                  



Study Data

TID = 0.93.



Perfusion





Nuclear Conclusion

ECG Findings: non-diagnostic                                                    

Clinical Findings: negative for ischemia                                        

Nuclear Findings: negative for ischemia                                         



Left Ventricular Function: abnormal                                             

Risk Study: low                                                                 

There is evidence of mild diaphragmatic attenuation artifact, otherwise normal

myocardial perfusion images. The left ventricular ejection fraction is mildly

reduced at 46% on gated SPECT analysis. No demonstrable ischemia on this study.

Recommend clinical correlation and echocardiographic reassessment of left

ventricular systolic function.                                                  















Conclusion

No chest pain with pharmacologic stress testing, ECG nondiagnostic due to 

resting ST and T wave abnormalities, myocardial perfusion images are resting 

for final test interpretation.

## 2021-07-20 VITALS — DIASTOLIC BLOOD PRESSURE: 91 MMHG | SYSTOLIC BLOOD PRESSURE: 175 MMHG

## 2021-07-20 LAB
BUN SERPL-MCNC: 69 MG/DL (ref 9–20)
BUN/CREAT SERPL: 12 %
CALCIUM SERPL-MCNC: 7.7 MG/DL (ref 8.4–10.2)
HCT VFR BLD CALC: 21.9 % (ref 35.5–45.6)
HEMOLYSIS INDEX: 195
HGB BLD-MCNC: 7.2 GM/DL (ref 11.8–15.2)

## 2021-07-20 PROCEDURE — 5A1D70Z PERFORMANCE OF URINARY FILTRATION, INTERMITTENT, LESS THAN 6 HOURS PER DAY: ICD-10-PCS | Performed by: INTERNAL MEDICINE

## 2021-07-20 RX ADMIN — NITROGLYCERIN TRANSDERMAL SYSTEM SCH: 0.4 PATCH, EXTENDED RELEASE TRANSDERMAL at 07:45

## 2021-07-20 RX ADMIN — EPOETIN ALFA-EPBX PRN UNIT: 20000 INJECTION, SOLUTION INTRAVENOUS; SUBCUTANEOUS at 15:17

## 2021-07-20 NOTE — PROGRESS NOTE
Assessment and Plan


Patient awake but sleepy and weak, and in no acute respiratory distress at rest.

Resting on room air, SaO2 is 97%. Patient admitted with nausea vomiting and 

abdominal pain. Abdominal pain is better now. Patient has history of 

Hypertension, asthma and diabetes and Aortic dissection. Patient has history of 

smoking 1 pack x 40years. Says stopped smoking approximately 30 years ago. 

Denies alcohol or drug abuse.Patient worked as teacher,  and worked in 

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 No known drug allergies..


Patient Patient running low grade temp at times. No leukocytosis.


CXR done on 7/15/21 reported: Mild increased pulmonary vascularity with mild 

edema appears improved. No 


pneumothorax.  


Patient is on lasix and protonix. 


Recommend SCDs.











- Patient Problems


(1) Acute kidney injury superimposed on chronic kidney disease


Current Visit: Yes   Status: Acute   


Plan to address problem: 


Management as per nephrology.








(2) CHF (congestive heart failure), NYHA class III


Current Visit: Yes   Status: Acute   


Plan to address problem: 


Management as per Cardioogy.








(3) GI bleed


Current Visit: Yes   Status: Acute   


Plan to address problem: 


Management as per gastroenterology.








(4) Hypertensive urgency


Current Visit: Yes   Status: Acute   


Plan to address problem: 


Management as per primary care.








(5) Pulmonary edema


Current Visit: Yes   Status: Acute   


Plan to address problem: 


Patient is on lasix and dialysis.








Subjective


Date of service: 07/20/21


Principal diagnosis: Anemia, Abnormal LFTs


Interval history: 





Patient awake but sleepy and weak, and in no acute respiratory distress at rest.

Resting on room air, SaO2 is 97%. Patient admitted with nausea vomiting and 

abdominal pain. Abdominal pain is better now. Patient has history of 

Hypertension, asthma and diabetes and Aortic dissection. Patient has history of 

smoking 1 pack x 40years. Says stopped smoking approximately 30 years ago. 

Denies alcohol or drug abuse.Patient worked as teacher,  and worked in 

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 No known drug allergies.


Patient running low grade temp at times. No leukocytosis.


CXR done on 7/15/21 reported: Mild increased pulmonary vascularity with mild 

edema appears improved. No 


pneumothorax.  


Patient is on lasix and protonix. 


Recommend SCDs. 





Objective


                               Vital Signs - 12hr











  07/19/21 07/20/21 07/20/21





  22:58 04:22 07:29


 


Temperature 98.5 F 99.2 F 98.2 F


 


Pulse Rate 74 74 77


 


Respiratory 14 20 18





Rate   


 


Blood Pressure 117/65 124/72 143/80


 


O2 Sat by Pulse 96 95 94





Oximetry   











Constitutional: no acute distress, alert, other (Weak.)


Eyes: non-icteric


ENT: oropharynx moist


Neck: supple, no lymphadenopathy


Ascultation: Bilateral: diminished breath sounds


Cardiovascular: regular rate and rhythm


Gastrointestinal: normoactive bowel sounds, soft, non-tender


Integumentary: normal


Extremities: no cyanosis, no edema


Neurologic: non-focal exam, pupils equal and round


Psychiatric: depressed


CBC and BMP: 


                                 07/20/21 04:46





                                 07/20/21 04:46


ABG, PT/INR, D-dimer: 


ABG











ABG pH  7.384 pH Units (7.350-7.450)   07/11/21  14:40    


 


ABG pCO2  32.3 mm Hg  07/11/21  14:40    


 


ABG pO2  107.8 mm Hg (80.0-90.0)  H  07/11/21  14:40    


 


ABG O2 Saturation  97.9 % (95.0-99.0)   07/11/21  14:40    





PT/INR, D-dimer











PT  18.4 Sec. (12.2-14.9)  H  07/13/21  19:20    


 


INR  1.48  (0.87-1.13)  H  07/13/21  19:20    








Abnormal lab findings: 


                                  Abnormal Labs











  07/10/21 07/10/21 07/11/21





  22:43 22:43 04:28


 


RBC   


 


Hgb  8.5 L  


 


Hct  26.1 L  


 


MCV  65 L  


 


MCH  21 L  


 


RDW  18.4 H  


 


Plt Count   


 


Mono % (Auto)   


 


Mono # (Auto)   


 


Seg Neuts % (Manual)  80.0 H  


 


Lymphocytes % (Manual)  13.0 L  


 


Nucleated RBC %   


 


Lymphocytes # (Manual)  0.9 L  


 


PT   


 


INR   


 


ABG pO2   


 


ABG HCO3   


 


ABG Base Excess   


 


ABG Hemoglobin   


 


Sodium   


 


Potassium   5.9 H  5.2 H


 


Chloride   


 


Carbon Dioxide   14 L  20 L


 


BUN   72 H  72 H


 


Creatinine   6.8 H  6.5 H


 


Glucose   152 H 


 


POC Glucose   


 


Calcium   


 


Phosphorus   


 


Magnesium   


 


Total Bilirubin   1.50 H  1.40 H


 


AST   517 H  604 H


 


ALT   448 H  491 H


 


Serum Total Protein   


 


Total Protein    6.2 L


 


Albumin   3.8 L  3.8 L


 


Alpha-1-Globulins   


 


PEP Interpretation   


 


PTH Intact   


 


Urine Creatinine   


 


Urine Total Protein   


 


Complement C3   


 


Crossmatch   














  07/11/21 07/11/21 07/11/21





  04:32 08:23 08:23


 


RBC   


 


Hgb   7.8 L 


 


Hct   24.9 L 


 


MCV   66 L 


 


MCH   21 L 


 


RDW   18.0 H 


 


Plt Count   98 L 


 


Mono % (Auto)   


 


Mono # (Auto)   


 


Seg Neuts % (Manual)   


 


Lymphocytes % (Manual)   1.0 L 


 


Nucleated RBC %   


 


Lymphocytes # (Manual)   0.1 L 


 


PT   


 


INR   


 


ABG pO2   


 


ABG HCO3   


 


ABG Base Excess   


 


ABG Hemoglobin   


 


Sodium   


 


Potassium    5.7 H


 


Chloride   


 


Carbon Dioxide    14 L


 


BUN    77 H


 


Creatinine    6.7 H


 


Glucose    126 H


 


POC Glucose  114 H  


 


Calcium   


 


Phosphorus   


 


Magnesium   


 


Total Bilirubin   


 


AST   


 


ALT   


 


Serum Total Protein   


 


Total Protein   


 


Albumin   


 


Alpha-1-Globulins   


 


PEP Interpretation   


 


PTH Intact   


 


Urine Creatinine   


 


Urine Total Protein   


 


Complement C3   


 


Crossmatch   














  07/11/21 07/11/21 07/11/21





  08:58 11:33 14:40


 


RBC   


 


Hgb   


 


Hct   


 


MCV   


 


MCH   


 


RDW   


 


Plt Count   


 


Mono % (Auto)   


 


Mono # (Auto)   


 


Seg Neuts % (Manual)   


 


Lymphocytes % (Manual)   


 


Nucleated RBC %   


 


Lymphocytes # (Manual)   


 


PT   


 


INR   


 


ABG pO2    107.8 H


 


ABG HCO3    18.8 L


 


ABG Base Excess    -5.6 L


 


ABG Hemoglobin    7.4 L


 


Sodium   


 


Potassium   


 


Chloride   


 


Carbon Dioxide   


 


BUN   


 


Creatinine   


 


Glucose   


 


POC Glucose  139 H  155 H 


 


Calcium   


 


Phosphorus   


 


Magnesium   


 


Total Bilirubin   


 


AST   


 


ALT   


 


Serum Total Protein   


 


Total Protein   


 


Albumin   


 


Alpha-1-Globulins   


 


PEP Interpretation   


 


PTH Intact   


 


Urine Creatinine   


 


Urine Total Protein   


 


Complement C3   


 


Crossmatch   














  07/11/21 07/11/21 07/11/21





  15:40 16:46 21:29


 


RBC   


 


Hgb   


 


Hct   


 


MCV   


 


MCH   


 


RDW   


 


Plt Count   


 


Mono % (Auto)   


 


Mono # (Auto)   


 


Seg Neuts % (Manual)   


 


Lymphocytes % (Manual)   


 


Nucleated RBC %   


 


Lymphocytes # (Manual)   


 


PT   


 


INR   


 


ABG pO2   


 


ABG HCO3   


 


ABG Base Excess   


 


ABG Hemoglobin   


 


Sodium   


 


Potassium   


 


Chloride   


 


Carbon Dioxide   


 


BUN   


 


Creatinine   


 


Glucose   


 


POC Glucose   158 H  138 H


 


Calcium   


 


Phosphorus   


 


Magnesium   


 


Total Bilirubin   


 


AST   


 


ALT   


 


Serum Total Protein   


 


Total Protein   


 


Albumin   


 


Alpha-1-Globulins   


 


PEP Interpretation   


 


PTH Intact   


 


Urine Creatinine  78.5 H  


 


Urine Total Protein  745 H  


 


Complement C3   


 


Crossmatch   














  07/12/21 07/12/21 07/12/21





  04:25 04:25 04:25


 


RBC  3.50 L  


 


Hgb  7.3 L  


 


Hct  22.8 L  


 


MCV  65 L  


 


MCH  21 L  


 


RDW  17.9 H  


 


Plt Count  88 L  


 


Mono % (Auto)   


 


Mono # (Auto)   


 


Seg Neuts % (Manual)  87.0 H  


 


Lymphocytes % (Manual)  5.0 L  


 


Nucleated RBC %  1.0 H  


 


Lymphocytes # (Manual)  0.4 L  


 


PT   


 


INR   


 


ABG pO2   


 


ABG HCO3   


 


ABG Base Excess   


 


ABG Hemoglobin   


 


Sodium   


 


Potassium   


 


Chloride   


 


Carbon Dioxide   20 L 


 


BUN   89 H 


 


Creatinine   7.6 H 


 


Glucose   129 H 


 


POC Glucose   


 


Calcium   


 


Phosphorus   8.20 H 


 


Magnesium   


 


Total Bilirubin   


 


AST   502 H 


 


ALT   636 H 


 


Serum Total Protein   


 


Total Protein   5.7 L 


 


Albumin   3.2 L 


 


Alpha-1-Globulins   


 


PEP Interpretation   


 


PTH Intact    176.3 H


 


Urine Creatinine   


 


Urine Total Protein   


 


Complement C3   


 


Crossmatch   














  07/12/21 07/12/21 07/12/21





  07:21 10:53 11:11


 


RBC   


 


Hgb   


 


Hct   


 


MCV   


 


MCH   


 


RDW   


 


Plt Count   


 


Mono % (Auto)   


 


Mono # (Auto)   


 


Seg Neuts % (Manual)   


 


Lymphocytes % (Manual)   


 


Nucleated RBC %   


 


Lymphocytes # (Manual)   


 


PT   


 


INR   


 


ABG pO2   


 


ABG HCO3   


 


ABG Base Excess   


 


ABG Hemoglobin   


 


Sodium   


 


Potassium   


 


Chloride   


 


Carbon Dioxide   


 


BUN   


 


Creatinine   


 


Glucose   


 


POC Glucose  120 H   120 H


 


Calcium   


 


Phosphorus   


 


Magnesium   


 


Total Bilirubin   


 


AST   


 


ALT   


 


Serum Total Protein   


 


Total Protein   


 


Albumin   


 


Alpha-1-Globulins   


 


PEP Interpretation   


 


PTH Intact   


 


Urine Creatinine   


 


Urine Total Protein   


 


Complement C3   


 


Crossmatch   See Detail 














  07/12/21 07/12/21 07/13/21





  16:28 21:07 05:32


 


RBC   


 


Hgb    7.9 L


 


Hct    23.8 L


 


MCV    65 L


 


MCH    22 L


 


RDW    17.6 H


 


Plt Count    94 L


 


Mono % (Auto)   


 


Mono # (Auto)   


 


Seg Neuts % (Manual)   


 


Lymphocytes % (Manual)   


 


Nucleated RBC %   


 


Lymphocytes # (Manual)   


 


PT   


 


INR   


 


ABG pO2   


 


ABG HCO3   


 


ABG Base Excess   


 


ABG Hemoglobin   


 


Sodium   


 


Potassium   


 


Chloride   


 


Carbon Dioxide   


 


BUN   


 


Creatinine   


 


Glucose   


 


POC Glucose  146 H  167 H 


 


Calcium   


 


Phosphorus   


 


Magnesium   


 


Total Bilirubin   


 


AST   


 


ALT   


 


Serum Total Protein   


 


Total Protein   


 


Albumin   


 


Alpha-1-Globulins   


 


PEP Interpretation   


 


PTH Intact   


 


Urine Creatinine   


 


Urine Total Protein   


 


Complement C3   


 


Crossmatch   














  07/13/21 07/13/21 07/13/21





  05:32 15:10 19:20


 


RBC   


 


Hgb   


 


Hct   


 


MCV   


 


MCH   


 


RDW   


 


Plt Count   


 


Mono % (Auto)   


 


Mono # (Auto)   


 


Seg Neuts % (Manual)   


 


Lymphocytes % (Manual)   


 


Nucleated RBC %   


 


Lymphocytes # (Manual)   


 


PT    18.4 H


 


INR    1.48 H


 


ABG pO2   


 


ABG HCO3   


 


ABG Base Excess   


 


ABG Hemoglobin   


 


Sodium   


 


Potassium   


 


Chloride   


 


Carbon Dioxide   


 


BUN  67 H  


 


Creatinine  5.8 H  


 


Glucose  106 H  


 


POC Glucose   137 H 


 


Calcium  8.3 L  


 


Phosphorus  6.50 H D  


 


Magnesium   


 


Total Bilirubin   


 


AST  207 H  


 


ALT  402 H  


 


Serum Total Protein   


 


Total Protein  5.1 L  


 


Albumin  3.0 L  


 


Alpha-1-Globulins   


 


PEP Interpretation   


 


PTH Intact   


 


Urine Creatinine   


 


Urine Total Protein   


 


Complement C3   


 


Crossmatch   














  07/13/21 07/14/21 07/14/21





  20:55 04:32 04:32


 


RBC   


 


Hgb   


 


Hct   


 


MCV   


 


MCH   


 


RDW   


 


Plt Count   


 


Mono % (Auto)   


 


Mono # (Auto)   


 


Seg Neuts % (Manual)   


 


Lymphocytes % (Manual)   


 


Nucleated RBC %   


 


Lymphocytes # (Manual)   


 


PT   


 


INR   


 


ABG pO2   


 


ABG HCO3   


 


ABG Base Excess   


 


ABG Hemoglobin   


 


Sodium   


 


Potassium   


 


Chloride   


 


Carbon Dioxide   


 


BUN   


 


Creatinine   


 


Glucose   


 


POC Glucose  195 H  


 


Calcium   


 


Phosphorus   


 


Magnesium   


 


Total Bilirubin   


 


AST   


 


ALT   


 


Serum Total Protein    5.4 L


 


Total Protein   


 


Albumin    3.0 L


 


Alpha-1-Globulins    0.4 H


 


PEP Interpretation    see below H


 


PTH Intact   


 


Urine Creatinine   


 


Urine Total Protein   


 


Complement C3   68 L 


 


Crossmatch   














  07/14/21 07/14/21 07/14/21





  08:57 11:55 15:50


 


RBC   


 


Hgb   


 


Hct   


 


MCV   


 


MCH   


 


RDW   


 


Plt Count   


 


Mono % (Auto)   


 


Mono # (Auto)   


 


Seg Neuts % (Manual)   


 


Lymphocytes % (Manual)   


 


Nucleated RBC %   


 


Lymphocytes # (Manual)   


 


PT   


 


INR   


 


ABG pO2   


 


ABG HCO3   


 


ABG Base Excess   


 


ABG Hemoglobin   


 


Sodium   


 


Potassium   


 


Chloride   


 


Carbon Dioxide   


 


BUN   


 


Creatinine   


 


Glucose   


 


POC Glucose  164 H  189 H  173 H


 


Calcium   


 


Phosphorus   


 


Magnesium   


 


Total Bilirubin   


 


AST   


 


ALT   


 


Serum Total Protein   


 


Total Protein   


 


Albumin   


 


Alpha-1-Globulins   


 


PEP Interpretation   


 


PTH Intact   


 


Urine Creatinine   


 


Urine Total Protein   


 


Complement C3   


 


Crossmatch   














  07/14/21 07/15/21 07/15/21





  20:38 04:31 04:31


 


RBC    3.52 L


 


Hgb    7.5 L


 


Hct    23.5 L


 


MCV    67 L


 


MCH    21 L


 


RDW    18.3 H


 


Plt Count    123 L


 


Mono % (Auto)    13.8 H


 


Mono # (Auto)    1.0 H


 


Seg Neuts % (Manual)   


 


Lymphocytes % (Manual)   


 


Nucleated RBC %   


 


Lymphocytes # (Manual)   


 


PT   


 


INR   


 


ABG pO2   


 


ABG HCO3   


 


ABG Base Excess   


 


ABG Hemoglobin   


 


Sodium   136 L 


 


Potassium   


 


Chloride   97.0 L 


 


Carbon Dioxide   


 


BUN   59 H 


 


Creatinine   6.4 H 


 


Glucose   121 H 


 


POC Glucose  147 H  


 


Calcium   8.1 L 


 


Phosphorus   


 


Magnesium   


 


Total Bilirubin   


 


AST   96 H 


 


ALT   274 H 


 


Serum Total Protein   


 


Total Protein   5.1 L 


 


Albumin   3.0 L 


 


Alpha-1-Globulins   


 


PEP Interpretation   


 


PTH Intact   


 


Urine Creatinine   


 


Urine Total Protein   


 


Complement C3   


 


Crossmatch   














  07/15/21 07/15/21 07/16/21





  07:53 21:07 08:20


 


RBC   


 


Hgb   


 


Hct   


 


MCV   


 


MCH   


 


RDW   


 


Plt Count   


 


Mono % (Auto)   


 


Mono # (Auto)   


 


Seg Neuts % (Manual)   


 


Lymphocytes % (Manual)   


 


Nucleated RBC %   


 


Lymphocytes # (Manual)   


 


PT   


 


INR   


 


ABG pO2   


 


ABG HCO3   


 


ABG Base Excess   


 


ABG Hemoglobin   


 


Sodium   


 


Potassium   


 


Chloride   


 


Carbon Dioxide   


 


BUN   


 


Creatinine   


 


Glucose   


 


POC Glucose  143 H  116 H  110 H


 


Calcium   


 


Phosphorus   


 


Magnesium   


 


Total Bilirubin   


 


AST   


 


ALT   


 


Serum Total Protein   


 


Total Protein   


 


Albumin   


 


Alpha-1-Globulins   


 


PEP Interpretation   


 


PTH Intact   


 


Urine Creatinine   


 


Urine Total Protein   


 


Complement C3   


 


Crossmatch   














  07/16/21 07/16/21 07/17/21





  12:16 20:07 06:10


 


RBC   


 


Hgb   


 


Hct   


 


MCV   


 


MCH   


 


RDW   


 


Plt Count   


 


Mono % (Auto)   


 


Mono # (Auto)   


 


Seg Neuts % (Manual)   


 


Lymphocytes % (Manual)   


 


Nucleated RBC %   


 


Lymphocytes # (Manual)   


 


PT   


 


INR   


 


ABG pO2   


 


ABG HCO3   


 


ABG Base Excess   


 


ABG Hemoglobin   


 


Sodium    135 L


 


Potassium   


 


Chloride    97.8 L


 


Carbon Dioxide   


 


BUN    62 H


 


Creatinine    6.0 H


 


Glucose    117 H


 


POC Glucose  248 H  167 H 


 


Calcium    7.9 L


 


Phosphorus   


 


Magnesium   


 


Total Bilirubin   


 


AST   


 


ALT   


 


Serum Total Protein   


 


Total Protein   


 


Albumin   


 


Alpha-1-Globulins   


 


PEP Interpretation   


 


PTH Intact   


 


Urine Creatinine   


 


Urine Total Protein   


 


Complement C3   


 


Crossmatch   














  07/17/21 07/17/21 07/17/21





  08:03 11:51 21:49


 


RBC   


 


Hgb   


 


Hct   


 


MCV   


 


MCH   


 


RDW   


 


Plt Count   


 


Mono % (Auto)   


 


Mono # (Auto)   


 


Seg Neuts % (Manual)   


 


Lymphocytes % (Manual)   


 


Nucleated RBC %   


 


Lymphocytes # (Manual)   


 


PT   


 


INR   


 


ABG pO2   


 


ABG HCO3   


 


ABG Base Excess   


 


ABG Hemoglobin   


 


Sodium   


 


Potassium   


 


Chloride   


 


Carbon Dioxide   


 


BUN   


 


Creatinine   


 


Glucose   


 


POC Glucose  121 H  202 H  194 H


 


Calcium   


 


Phosphorus   


 


Magnesium   


 


Total Bilirubin   


 


AST   


 


ALT   


 


Serum Total Protein   


 


Total Protein   


 


Albumin   


 


Alpha-1-Globulins   


 


PEP Interpretation   


 


PTH Intact   


 


Urine Creatinine   


 


Urine Total Protein   


 


Complement C3   


 


Crossmatch   














  07/18/21 07/18/21 07/18/21





  11:45 16:06 20:53


 


RBC   


 


Hgb   


 


Hct   


 


MCV   


 


MCH   


 


RDW   


 


Plt Count   


 


Mono % (Auto)   


 


Mono # (Auto)   


 


Seg Neuts % (Manual)   


 


Lymphocytes % (Manual)   


 


Nucleated RBC %   


 


Lymphocytes # (Manual)   


 


PT   


 


INR   


 


ABG pO2   


 


ABG HCO3   


 


ABG Base Excess   


 


ABG Hemoglobin   


 


Sodium   


 


Potassium   


 


Chloride   


 


Carbon Dioxide   


 


BUN   


 


Creatinine   


 


Glucose   


 


POC Glucose  191 H  143 H  148 H


 


Calcium   


 


Phosphorus   


 


Magnesium   


 


Total Bilirubin   


 


AST   


 


ALT   


 


Serum Total Protein   


 


Total Protein   


 


Albumin   


 


Alpha-1-Globulins   


 


PEP Interpretation   


 


PTH Intact   


 


Urine Creatinine   


 


Urine Total Protein   


 


Complement C3   


 


Crossmatch   














  07/19/21 07/19/21 07/19/21





  03:49 11:53 15:35


 


RBC   


 


Hgb   


 


Hct   


 


MCV   


 


MCH   


 


RDW   


 


Plt Count   


 


Mono % (Auto)   


 


Mono # (Auto)   


 


Seg Neuts % (Manual)   


 


Lymphocytes % (Manual)   


 


Nucleated RBC %   


 


Lymphocytes # (Manual)   


 


PT   


 


INR   


 


ABG pO2   


 


ABG HCO3   


 


ABG Base Excess   


 


ABG Hemoglobin   


 


Sodium  134 L  


 


Potassium  3.5 L  


 


Chloride  96.0 L  


 


Carbon Dioxide   


 


BUN  54 H  


 


Creatinine  5.0 H  


 


Glucose   


 


POC Glucose   156 H  198 H


 


Calcium  7.6 L  


 


Phosphorus   


 


Magnesium  1.60 L  


 


Total Bilirubin   


 


AST   


 


ALT   


 


Serum Total Protein   


 


Total Protein   


 


Albumin   


 


Alpha-1-Globulins   


 


PEP Interpretation   


 


PTH Intact   


 


Urine Creatinine   


 


Urine Total Protein   


 


Complement C3   


 


Crossmatch   














  07/19/21 07/20/21 07/20/21





  20:33 04:46 04:46


 


RBC   


 


Hgb   7.2 L 


 


Hct   21.9 L 


 


MCV   


 


MCH   


 


RDW   


 


Plt Count   


 


Mono % (Auto)   


 


Mono # (Auto)   


 


Seg Neuts % (Manual)   


 


Lymphocytes % (Manual)   


 


Nucleated RBC %   


 


Lymphocytes # (Manual)   


 


PT   


 


INR   


 


ABG pO2   


 


ABG HCO3   


 


ABG Base Excess   


 


ABG Hemoglobin   


 


Sodium    133 L


 


Potassium   


 


Chloride    93.9 L


 


Carbon Dioxide   


 


BUN    69 H


 


Creatinine    5.9 H


 


Glucose    106 H


 


POC Glucose  215 H  


 


Calcium    7.7 L


 


Phosphorus   


 


Magnesium   


 


Total Bilirubin   


 


AST   


 


ALT   


 


Serum Total Protein   


 


Total Protein   


 


Albumin   


 


Alpha-1-Globulins   


 


PEP Interpretation   


 


PTH Intact   


 


Urine Creatinine   


 


Urine Total Protein   


 


Complement C3   


 


Crossmatch   














  07/20/21





  07:27


 


RBC 


 


Hgb 


 


Hct 


 


MCV 


 


MCH 


 


RDW 


 


Plt Count 


 


Mono % (Auto) 


 


Mono # (Auto) 


 


Seg Neuts % (Manual) 


 


Lymphocytes % (Manual) 


 


Nucleated RBC % 


 


Lymphocytes # (Manual) 


 


PT 


 


INR 


 


ABG pO2 


 


ABG HCO3 


 


ABG Base Excess 


 


ABG Hemoglobin 


 


Sodium 


 


Potassium 


 


Chloride 


 


Carbon Dioxide 


 


BUN 


 


Creatinine 


 


Glucose 


 


POC Glucose  106 H


 


Calcium 


 


Phosphorus 


 


Magnesium 


 


Total Bilirubin 


 


AST 


 


ALT 


 


Serum Total Protein 


 


Total Protein 


 


Albumin 


 


Alpha-1-Globulins 


 


PEP Interpretation 


 


PTH Intact 


 


Urine Creatinine 


 


Urine Total Protein 


 


Complement C3 


 


Crossmatch 











Allied health notes reviewed: nursing

## 2021-07-20 NOTE — DISCHARGE SUMMARY
Providers





- Providers


Date of Admission: 


07/11/21 02:17





Date of discharge: 07/20/21


Attending physician: 


AMY J KOCHERLA





                                        





07/11/21 02:18


Consult to Dietitian/Nutrition [CONS] Routine 


   Physician Instructions: 


   Reason For Exam: 


   Reason for Consult: Diet education


Consult to Physician [CONS] Routine 


   Comment: 


   Consulting Provider: CAESAR GUAMAN


   Physician Instructions: 


   Reason For Exam: Acute on Chronic Renal Failure





07/11/21 02:28


Consult to Physician [CONS] Routine 


   Comment: 


   Consulting Provider: CLAUDE MC


   Physician Instructions: 


   Reason For Exam: Elevated Liver Enzymes





07/11/21 09:59


Consult to Physician [CONS] Routine 


   Comment: 


   Consulting Provider: FOZIA DYE


   Physician Instructions: 


   Reason For Exam: cardiac risk strat/





07/11/21 10:12


Consult to Physician [CONS] Routine 


   Comment: 


   Consulting Provider: KAITY KHOURY


   Physician Instructions: 


   Reason For Exam: critical care





07/13/21 10:10


Consult to Interventional Radiology [CONS] Routine 


   Consulting Provider: JULIA NIÑO


   Reason For Exam: Tunnel hemodialysis catheter


   Place consult to:: VASCULAR


   Notified:: YES


   Was contact made?: Yes


   If yes, spoke with:: DR SCHILLING


   Comment:: DR SCHILLING ON FLOOR











Primary care physician: 


PRIMARY CARE MD








Hospitalization


Reason for admission: Abdominal pain and dark stool


Condition: Stable


Pertinent studies: 


CT of the abdomen and pelvis reveals cardiomegaly with interlobular septal 

thickening in the lung bases likely reflecting edema.  Moderate right and trace 

left pleural effusions.  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 

2019.


Abdominal ultrasound


Echocardiogram


Multiple chest x-rays


CT chest








Procedures: 


Central line placement


Permacath placement


Hemodialysis per schedule


Nuclear stress test; negative for ischemia


EF 45%








Hospital course: 


Abdominal pain


Dark stool


Cough


Generalized weakness


History of present illness: 





80-year-old [place] male patient with significant past medical history of 

hypertension diabetes mellitus history of thoracic aortic dissection status post

 repair was admitted through emergency room with nausea vomiting abdominal pain 

and dark stool for the last 2 weeks worse last 24 hours, initial evaluation is 

consistent with acute kidney injury evaluated by nephrology, renal function 

closely monitored as renal function did not improve patient was considered for 

hemodialysis patient had dialysis catheter placement and received dialysis per 

schedule per nephrology


Patient also has dark stool, evaluated by GI considered EGD consider EGD 

patient's hemoglobin and hematocrit were closely monitored and received 1 unit 

of PRBC symptoms slowly but gradually improved patient also had chest pain 

evaluated by GI cardiology patient underwent nuclear stress test which was 

negative for reversible ischemia with ejection fraction of 45% and patient's 

cardiac medications were optimized patient has malnutrition started on nutrition

 supplements and nutritionist evaluated the patient and started on nutrition 

supplements





Patient symptoms slowly but gradually improved and the case management and 

 have evaluated the patient and outpatient hemodialysis was set 

up for initially physical therapy occupational therapy and patient is being 

discharged home with home health.


Patient was cleared by all the consultants, hemodynamically and clinically 

stable at discharge


Patient advised to follow with all the consultants per schedule and family is 

counseled the importance of adhering to the treatment plan as well as compliant 

with hemodialysis patient is stable at discharge patient is stable at discharge








Discharge diagnosis:


--ESRD vs acute on chronic kidney disease


Current Visit: Yes   Status: Acute   


Patient  with chronic kidney disease.  Worsening renal function


s/p Permacath placement, hemodialysis 7/12, 7/13


Nephrology following, outpatient HD chair placement at discharge per renal. 





-- Hyperkalemia/present on admission


Current Visit: Yes   Status: Acute   


Patient is received insulin and glucose, calcium gluconate sodium bicarb while 

in the  emergency room


Normal potassium levels, monitor electrolytes





-- Anemia


Current Visit: Yes   Status: Acute   


Possibly from GI bleed.


Received 1 unit of PRBC transfusion, Hb today 7.9


Stool Hemoccult done in the ER was negative


Patient has no new episodes of bleeding, hemoglobin today 7.5


Due to cardiac risk factors, cardiology not cleared for elective endoscopy, GI 

following





--Hypokalemia; K3.5


Current Visit: Yes   Status: Acute   


 Replenish per protocol and monitor levels 





--Hypomagnesemia;


Current Visit: Yes   Status: Acute   


Nephro placed on magnesium oxide 400 twice a day


Monitor electrolytes





--GI bleed


Current Visit: Yes   Status: Acute   


GI evaluated the patient


Cardiology did not clear for elective endoscopy,due to cardiac risk factors


Patient's bleeding resolved, hemodynamically stable


Outpatient evaluation upon discharge





--Acute liver injury /transaminitis


Current Visit: Yes   Status: Acute   


CT of the abdomen and pelvis did not reveal any acute abnormality.


Trending down.  -96, -to 74


GI following





--Hypertensive urgency


Current Visit: Yes   Status: Acute   


Moderate control , continue current antihypertensives 


As needed medications , closely monitor





-- Pulmonary edema


Current Visit: Yes   Status: Acute   


Pulmonary edema shown on CT scan of the abdomen and pelvis.


Patient had a dose of IV Lasix.


Will check echocardiogram.


We will place a consult to cardiology for evaluation.





--Moderate to severe malnutrition;


Current Visit: Yes   Status: Chronic


Treat the underlying cause, nutrition supplements


Nutrition consult





Stable at discharge








Disposition: 01 HOME / SELF CARE / HOMELESS


Final Discharge Diagnosis (Prints w/discharge instructions): ESRD vs acute on 

chronic kidney disease.  Initiated hemodialysis.  Hyperkalemia resolved.  

Pulmonary edema improved.  Acute liver injury trending down.  GI bleeding 

stable.  Anemia received 1 unit PRBC.  Hypokalemia corrected.  Hypomagnesemia 

resolved.  Severe malnutrition


Time spent for discharge: 35 min





Core Measure Documentation





- Palliative Care


Palliative Care/ Comfort Measures: Not Applicable





- Core Measures


Any of the following diagnoses?: none





Exam





- Constitutional


Vitals: 


                                        











Temp Pulse Resp BP Pulse Ox


 


 98.3 F   71   18   134/79   97 


 


 07/20/21 12:35  07/20/21 15:15  07/20/21 12:35  07/20/21 15:15  07/20/21 11:28











General appearance: Present: no acute distress, well-nourished





- EENT


Eyes: Present: PERRL, EOM intact





- Neck


Neck: Present: supple, normal ROM





- Respiratory


Respiratory effort: normal


Respiratory: bilateral: diminished, negative: rales, rhonchi, wheezing





- Cardiovascular


Rhythm: regular


Heart Sounds: Present: S1 & S2





- Extremities


Extremities: no ischemia, No edema





- Abdominal


General gastrointestinal: Present: soft, non-tender, non-distended, normal bowel

 sounds





- Integumentary


Integumentary: Present: clear, warm





- Musculoskeletal


Musculoskeletal: strength equal bilaterally





- Psychiatric


Psychiatric: appropriate mood/affect, cooperative





- Neurologic


Neurologic: CNII-XII intact, moves all extremities





Plan


Activity: advance as tolerated, fall precautions


Diet: renal


Additional Instructions: [facility] dialysis clinic.  [address] [id number].  Phone#[phone].  Chair days/time: 

Monday,Wednesday,Friday at 10:30am, 1st treatment outpatient is July 21st, 2021 

at 10:00am to complete paperwork.  Fall precautions if you have worsening 

symptoms contact MD or go to emergency room as needed.  You need to follow with 

cardiologist, nephrologist, GI and pulmonary physicians per schedule


Follow up with: 


PRIMARY CARE,MD [Primary Care Provider] - 7 Days


ELENA MIRANDA MD [Staff Physician] - 14 Days


SHEEBA THORNTON MD [Staff Physician] - 7 Days


ILANA BRONSON MD [Staff Physician] - 14 Days


ZOEY GOOD MD [Staff Physician] - 7 Days


Prescriptions: 


amLODIPine 10 mg PO QDAY #30 tablet


carvediloL [Coreg] 12.5 mg PO BID #60 tablet


Aspirin EC [Halfprin EC] 81 mg PO QDAY #30


ISOSORBIDE MONOnitrate [Imdur ER] 30 mg PO QDAY #30 tablet


Furosemide [Lasix TAB] 80 mg PO QDAY #30 tablet


Calcium Acetate [Phoslo] 667 mg PO TIDWM #30 capsule


Pantoprazole [Protonix TAB] 40 mg PO BIDAC #60 tablet

## 2021-07-20 NOTE — PROGRESS NOTE
Assessment and Plan


1. 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


Suspect ESRD.


CT abdomen negative for hydro.


Monitor renal function.


Renal prognosis is guarded to poor.


Avoid nephrotoxic agents.


Meds dosage based on GFR.


Patient was started on hemodialysis due to significant decline in the GFR, 

associated hyperkalemia, metabolic acidosis and volume overload.


Hemodialysis: 7/12, 7/13, 7/15, 7/17.


HD today.





2. FEN:


Hyperkalemia, improved, monitor.


Anion-gap Metabolic acidosis, s/p HD, monitor.


Volume overload, improved with HD.


Monitor lytes.





3. Heavy proteinuria:


?etiology.


STEVE, ANCA and GBM Ab are negative.


SPEP negative for M-spike.


C3 low.


Very unlikely patient will tolerate any Immunosuppressive therapy.





4. Decompensated CHF:


Echo EF 45-50%.


Strict I/O.


On Carvedilol.


Followed by Cards.





5. Elevated liver enzymes:


Hepatitis serologies negative.


Improving.





6. GI bleed:


Per GI EGD after clearance from Cards.





7. Hypertensive urgency:


BP controlled.


UF with HD as tolerated.


Adjust meds as needed.


Monitor.





8. Hypochromic Anemia, POA:


?2/2 GI bleed.


Followed by GI.


Epogen.


Monitor.





9. Thrombocytopenia.





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





Outpatient hemodialysis chair at [facility].











Subjective:


Patient was seen and examined at the bedside.











Examination:


General appearance: well-developed, thin built, appears stated age, not in 

distress


HEENT: ATNC, pupils equal


Neck: supple


Respiratory: bibasal diminished breath sounds


Cardiology: regular, S1S2, no murmur


Gastrointestinal: soft, bowel sounds heard, not tender


Integumentary: warm and dry


Neurologic: alert, moving extremities


Ext: no edema


Hemodialysis access: L Tunnel catheter








Subjective


Date of service: 07/20/21


Principal diagnosis: Anemia, Abnormal LFTs





Objective





- Vital Signs


Vital signs: 


                               Vital Signs - 12hr











  07/19/21 07/20/21 07/20/21





  22:58 04:22 07:29


 


Temperature 98.5 F 99.2 F 98.2 F


 


Pulse Rate 74 74 77


 


Respiratory 14 20 18





Rate   


 


Blood Pressure 117/65 124/72 143/80


 


O2 Sat by Pulse 96 95 94





Oximetry   














- Lab





                                 07/20/21 04:46





                                 07/20/21 04:46


                             Most recent lab results











ABG pH  7.384 pH Units (7.350-7.450)   07/11/21  14:40    


 


ABG pCO2  32.3 mm Hg  07/11/21  14:40    


 


ABG pO2  107.8 mm Hg (80.0-90.0)  H  07/11/21  14:40    


 


ABG HCO3  18.8 mmol/L (20.0-26.0)  L  07/11/21  14:40    


 


ABG O2 Saturation  97.9 % (95.0-99.0)   07/11/21  14:40    


 


Calcium  7.7 mg/dL (8.4-10.2)  L  07/20/21  04:46    


 


Phosphorus  2.60 mg/dL (2.5-4.5)   07/17/21  06:10    


 


Magnesium  1.90 mg/dL (1.7-2.3)   07/20/21  04:46    


 


Urine Creatinine  78.5 mg/dL (0.1-20.0)  H  07/11/21  15:40    


 


Urine Sodium  76 mmol/L  07/11/21  15:40    


 


Urine Total Protein  745 mg/dL (5-11.8)  H  07/11/21  15:40    














Medications & Allergies





- Medications


Allergies/Adverse Reactions: 


                                    Allergies





No Known Allergies Allergy (Unverified 01/17/15 01:13)


   








Home Medications: 


                                Home Medications











 Medication  Instructions  Recorded  Confirmed  Last Taken  Type


 


Atorvastatin Calcium [Lipitor] 80 mg PO QDAY 07/10/21 07/10/21 Unknown History


 


Aspirin EC [Halfprin EC] 81 mg PO QDAY #30 07/20/21  Unknown Rx


 


Calcium Acetate [Phoslo] 667 mg PO TIDWM #30 capsule 07/20/21  Unknown Rx


 


Furosemide [Lasix TAB] 80 mg PO QDAY #30 tablet 07/20/21  Unknown Rx


 


ISOSORBIDE MONOnitrate [Imdur ER] 30 mg PO QDAY #30 tablet 07/20/21  Unknown Rx


 


Pantoprazole [Protonix TAB] 40 mg PO BIDAC #60 tablet 07/20/21  Unknown Rx


 


amLODIPine 10 mg PO QDAY #30 tablet 07/20/21  Unknown Rx


 


carvediloL [Coreg] 12.5 mg PO BID #60 tablet 07/20/21  Unknown Rx











Active Medications: 














Generic Name Dose Route Start Last Admin





  Trade Name Freq  PRN Reason Stop Dose Admin


 


Acetaminophen  650 mg  07/11/21 02:17 





  Acetaminophen 325 Mg Tab  PO  





  Q6H PRN  





  Pain MILD(1-3)/Fever >100.5/HA  


 


Amlodipine Besylate  10 mg  07/12/21 13:00  07/19/21 19:22





  Amlodipine 10 Mg Tab  PO   Not Given





  QDAY [facility]  


 


Aspirin  81 mg  07/12/21 10:00  07/19/21 19:22





  Aspirin Ec 81 Mg Tab  PO   Not Given





  QDAY [facility]  


 


Calcium Acetate  667 mg  07/13/21 12:00  07/19/21 19:23





  Calcium Acetate 667 Mg Cap  PO   Not Given





  TIDWM [facility]  


 


Carvedilol  12.5 mg  07/11/21 22:00  07/19/21 21:30





  Carvedilol 12.5 Mg Tab  PO   12.5 mg





  BID [facility]   Administration


 


Dextrose  0 ml  07/11/21 02:17 





  Dextrose 50% In Water (25gm) 50 Ml Syringe  IV  





  Q30MIN PRN  





  Hypoglycemia  





  Protocol  


 


Furosemide  80 mg  07/14/21 10:00  07/19/21 19:23





  Furosemide 40 Mg Tab  PO   Not Given





  QDAY [facility]  


 


Hydralazine HCl  10 mg  07/11/21 06:13  07/11/21 10:10





  Hydralazine 20 Mg/1 Ml Inj  IV   10 mg





  Q4H PRN   Administration





  Blood Pressure  


 


Sodium Chloride  100 mls @ 999 mls/hr  07/12/21 10:00 





  Nacl 0.9%  IV  





  LIBIA PRN  





  Hypotension  


 


Insulin Human Regular  0 units  07/11/21 07:30  07/19/21 21:33





  Insulin Regular, Human 100 Units/1 Ml  SUB-Q   2 units





  ACHS [facility]   Administration





  Protocol  


 


Isosorbide Mononitrate  30 mg  07/14/21 15:00  07/19/21 19:22





  Isosorbide Mononitrate Er 30 Mg Tab  PO   Not Given





  QDAY [facility]  


 


Magnesium Hydroxide  30 ml  07/11/21 02:17 





  Magnesium Hydroxide (Mom) Oral Liqd Udc  PO  





  Q4H PRN  





  Constipation  


 


Magnesium Oxide  400 mg  07/19/21 10:00  07/19/21 21:30





  Magnesium Oxide 400 Mg Tab  PO   400 mg





  BID [facility]   Administration


 


Nitroglycerin  0.4 mg  07/13/21 06:00  07/20/21 07:45





  Nitroglycerin 0.4 Mg Patch 24hr  TD   Not Given





  QDAY@0600 [facility]  


 


Ondansetron HCl  4 mg  07/11/21 02:17  07/13/21 16:38





  Ondansetron 4 Mg/2 Ml Inj  IV   4 mg





  Q8H PRN   Administration





  Nausea And Vomiting  


 


Pantoprazole Sodium  40 mg  07/17/21 16:30  07/19/21 19:22





  Pantoprazole 40 Mg Tab  PO   Not Given





  BIDAC [facility]  


 


Sodium Chloride  10 ml  07/11/21 10:00  07/19/21 21:32





  Sodium Chloride 0.9% 10 Ml Flush Syringe  IV   10 ml





  BID SOTERO   Administration


 


Sodium Chloride  10 ml  07/11/21 02:17 





  Sodium Chloride 0.9% 10 Ml Flush Syringe  IV  





  PRN PRN  





  LINE FLUSH

## 2021-07-20 NOTE — PROGRESS NOTE
Assessment and Plan





- Patient Problems


(1) Abnormal ECG


Current Visit: Yes   Status: Acute   


Plan to address problem: 


As previously reported, the ECG showed marked T wave abnormalities across the 

precordium, more pronounced than his baseline revascularization abnormalities of

LVH.  He has no prior history of coronary artery disease, but has had 

thoracotomy for repair of an ascending aortic aneurysm.  





Due to abnormal ECG, we recommended a Lexiscan thallium, which showed normal 

myocardial perfusion, with mildly reduced left ventricular systolic ejection 

fraction 46%.  Recommend conservative management, and close outpatient follow-up

with his primary cardiologist.








(2) Preoperative cardiovascular examination


Current Visit: Yes   Status: Acute   





Subjective


Date of service: 07/20/21


Principal diagnosis: Anemia, Abnormal LFTs


Interval history: 





Patient is comfortable, no acute distress, no new cardiac complaints reported.  

A Lexiscan thallium stress test done yesterday showed normal perfusion, with 

left ventricular ejection fraction 46%, identical with the echocardiographic 

left ventricular function assessment of 45 to 50%.





Objective


                                   Vital Signs











  Temp Pulse Resp Resp BP Pulse Ox


 


 07/20/21 11:28       97


 


 07/20/21 07:29  98.2 F  77  18   143/80  94


 


 07/20/21 04:22  99.2 F  74  20   124/72  95


 


 07/19/21 22:58  98.5 F  74  14   117/65  96


 


 07/19/21 19:27  98.2 F  77  14   122/70  96


 


 07/19/21 16:00     20  


 


 07/19/21 11:54  97.5 F L  75  16   133/71  97














- Physical Examination


General: No Apparent Distress


HEENT: Positive: PERRL


Neck: Positive: neck supple


Cardiac: Positive: Reg Rate and Rhythm


Lungs: Positive: Decreased Breath Sounds


Neuro: Positive: Weakness (Generalized weakness, frail and chronically ill 

appearing)


Abdomen: Positive: Soft


Skin: Positive: Clear


Extremities: Absent: normal





- Labs and Meds


                                       CBC











  07/20/21 Range/Units





  04:46 


 


Hgb  7.2 L  (11.8-15.2)  gm/dl


 


Hct  21.9 L  (35.5-45.6)  %








                          Comprehensive Metabolic Panel











  07/20/21 Range/Units





  04:46 


 


Sodium  133 L  (137-145)  mmol/L


 


Potassium  4.7  D  (3.6-5.0)  mmol/L


 


Chloride  93.9 L  ()  mmol/L


 


Carbon Dioxide  25  (22-30)  mmol/L


 


BUN  69 H  (9-20)  mg/dL


 


Creatinine  5.9 H  (0.8-1.3)  mg/dL


 


Glucose  106 H  ()  mg/dL


 


Calcium  7.7 L  (8.4-10.2)  mg/dL














- Allied health notes


Allied health notes reviewed: nursing

## 2021-07-20 NOTE — PROGRESS NOTE
Assessment and Plan


Assessment and plan: 


--Acute kidney injury superimposed on chronic kidney disease


Current Visit: Yes   Status: Acute   


Patient  with chronic kidney disease.  Worsening renal function


s/p Permacath placement, hemodialysis 7/12, 7/13


Nephrology following, outpatient HD chair placement at discharge per renal. 





-- Hyperkalemia/present on admission


Current Visit: Yes   Status: Acute   


Patient is received insulin and glucose, calcium gluconate sodium bicarb while 

in the  emergency room


Normal potassium levels, monitor electrolytes





-- Anemia


Current Visit: Yes   Status: Acute   


Possibly from GI bleed.


Received 1 unit of PRBC transfusion, Hb today 7.9


Stool Hemoccult done in the ER was negative


Patient has no new episodes of bleeding, hemoglobin today 7.5


Due to cardiac risk factors, cardiology not cleared for elective endoscopy, GI 

following





--Hypokalemia; K3.5


Current Visit: Yes   Status: Acute   


 Replenish per protocol and monitor levels 





--Hypomagnesemia;


Current Visit: Yes   Status: Acute   


Nephro placed on magnesium oxide 400 twice a day


Monitor electrolytes





--GI bleed


Current Visit: Yes   Status: Acute   


GI evaluated the patient


Cardiology did not clear for elective endoscopy,due to cardiac risk factors


Patient's bleeding resolved, hemodynamically stable


Outpatient evaluation upon discharge





--Acute liver injury /transaminitis


Current Visit: Yes   Status: Acute   


CT of the abdomen and pelvis did not reveal any acute abnormality.


Trending down.  -96, -to 74


GI following





--Hypertensive urgency


Current Visit: Yes   Status: Acute   


Moderate control , continue current antihypertensives 


As needed medications , closely monitor





-- Pulmonary edema


Current Visit: Yes   Status: Acute   


Pulmonary edema shown on CT scan of the abdomen and pelvis.


Patient had a dose of IV Lasix.


Will check echocardiogram.


We will place a consult to cardiology for evaluation.





--Moderate to severe malnutrition;


Current Visit: Yes   Status: Chronic


Treat the underlying cause, nutrition supplements


Nutrition consult





--DVT prophylaxis


Current Visit: Yes   Status: Acute   


Plan to address problem: 


Patient placed on sequential compression device.





-- Full code status 


Current Visit: Yes   Status: Acute   


Patient is full code.





Patient is stable for discharge


Awaiting outpatient HD placement


DC planning per case management











Brief history and daily hospital course:


7/13/2021; patient received permacath placement


Hemodialysis per schedule, possible endoscopy tomorrow





7/14/2021; patient feels slightly better, GI waiting for


Cardiology clearance for endoscopy





7/15/2021; no new episodes of GI bleeding


HPI low stable 7.5 today


Due to multiple cardiac risk factors, cardiology did not


For elective endoscopy, unless life-threatening bleeding.





7/16/2021; patient feels better


No new episodes of GI bleeding, no endoscopy during this admission


Outpatient HD placement per nephrology prior to discharge





7/17/2021; awaiting outpatient HD placement


I spoke with patient's daughter and granddaughter at the bedside


Answered all their questions, they were anxious to take him home


Informed them that outpatient HD placement should be scheduled by case 

management


Prior to discharge, they verbalized understanding





7/18/2021; patient is medically stable for discharge


Awaiting outpatient HD placement





7/19/2021;


Hypokalemia, hypomagnesemia, replenish per protocol 


awaiting outpatient HD placement


Disposition per nephrology

















History


Interval history: 


I have seen and examined the patient at the bedside


Patient's chart and medications reviewed


Patient is alert and awake


Responding appropriately


Vital signs reviewed








Hospitalist Physical





- Constitutional


Vitals: 


                                        











Temp Pulse Resp BP Pulse Ox


 


 98.2 F   77   18   143/80   94 


 


 07/20/21 07:29  07/20/21 07:29  07/20/21 07:29  07/20/21 07:29  07/20/21 07:29











General appearance: Present: no acute distress, cachectic, other (Patient is 

lethargic, frail and chronically ill-appearing)





- EENT


Eyes: Present: PERRL, EOM intact





- Neck


Neck: Present: supple, normal ROM





- Respiratory


Respiratory effort: normal


Respiratory: bilateral: diminished, negative: rales, rhonchi, wheezing





- Cardiovascular


Rhythm: regular


Heart Sounds: Present: S1 & S2





- Extremities


Extremities: no ischemia, No edema





- Abdominal


General gastrointestinal: soft, non-tender, non-distended, normal bowel sounds





- Integumentary


Integumentary: Present: clear, warm





- Psychiatric


Psychiatric: appropriate mood/affect, cooperative





- Neurologic


Neurologic: CNII-XII intact, moves all extremities





Results





- Labs


CBC & Chem 7: 


                                 07/20/21 04:46





                                 07/20/21 04:46


Labs: 


                             Laboratory Last Values











WBC  7.6 K/mm3 (4.5-11.0)   07/15/21  04:31    


 


RBC  3.52 M/mm3 (3.65-5.03)  L  07/15/21  04:31    


 


Hgb  7.2 gm/dl (11.8-15.2)  L  07/20/21  04:46    


 


Hct  21.9 % (35.5-45.6)  L  07/20/21  04:46    


 


MCV  67 fl (84-94)  L  07/15/21  04:31    


 


MCH  21 pg (28-32)  L  07/15/21  04:31    


 


MCHC  32 % (32-34)   07/15/21  04:31    


 


RDW  18.3 % (13.2-15.2)  H  07/15/21  04:31    


 


Plt Count  123 K/mm3 (140-440)  L  07/15/21  04:31    


 


Lymph % (Auto)  15.8 % (13.4-35.0)   07/15/21  04:31    


 


Mono % (Auto)  13.8 % (0.0-7.3)  H  07/15/21  04:31    


 


Eos % (Auto)  1.2 % (0.0-4.3)   07/15/21  04:31    


 


Baso % (Auto)  0.4 % (0.0-1.8)   07/15/21  04:31    


 


Lymph # (Auto)  1.2 K/mm3 (1.2-5.4)   07/15/21  04:31    


 


Mono # (Auto)  1.0 K/mm3 (0.0-0.8)  H  07/15/21  04:31    


 


Eos # (Auto)  0.1 K/mm3 (0.0-0.4)   07/15/21  04:31    


 


Baso # (Auto)  0.0 K/mm3 (0.0-0.1)   07/15/21  04:31    


 


Add Manual Diff  Complete   07/12/21  04:25    


 


Total Counted  100   07/12/21  04:25    


 


Seg Neutrophils %  68.8 % (40.0-70.0)   07/15/21  04:31    


 


Seg Neuts % (Manual)  87.0 % (40.0-70.0)  H  07/12/21  04:25    


 


Band Neutrophils %  1.0 %  07/12/21  04:25    


 


Lymphocytes % (Manual)  5.0 % (13.4-35.0)  L  07/12/21  04:25    


 


Monocytes % (Manual)  7.0 % (0.0-7.3)   07/12/21  04:25    


 


Nucleated RBC %  1.0 % (0.0-0.9)  H  07/12/21  04:25    


 


Seg Neutrophils #  5.3 K/mm3 (1.8-7.7)   07/15/21  04:31    


 


Seg Neutrophils # Man  7.0 K/mm3 (1.8-7.7)   07/12/21  04:25    


 


Band Neutrophils #  0.1 K/mm3  07/12/21  04:25    


 


Lymphocytes # (Manual)  0.4 K/mm3 (1.2-5.4)  L  07/12/21  04:25    


 


Abs React Lymphs (Man)  0.0 K/mm3  07/12/21  04:25    


 


Monocytes # (Manual)  0.6 K/mm3 (0.0-0.8)   07/12/21  04:25    


 


Eosinophils # (Manual)  0.0 K/mm3 (0.0-0.4)   07/12/21  04:25    


 


Basophils # (Manual)  0.0 K/mm3 (0.0-0.1)   07/12/21  04:25    


 


Metamyelocytes #  0.0 K/mm3  07/12/21  04:25    


 


Myelocytes #  0.0 K/mm3  07/12/21  04:25    


 


Promyelocytes #  0.0 K/mm3  07/12/21  04:25    


 


Blast Cells #  0.0 K/mm3  07/12/21  04:25    


 


WBC Morphology  Not Reportable   07/12/21  04:25    


 


Hypersegmented Neuts  Not Reportable   07/12/21  04:25    


 


Hyposegmented Neuts  Not Reportable   07/12/21  04:25    


 


Hypogranular Neuts  Not Reportable   07/12/21  04:25    


 


Smudge Cells  Not Reportable   07/12/21  04:25    


 


Toxic Granulation  Not Reportable   07/12/21  04:25    


 


Toxic Vacuolation  Not Reportable   07/12/21  04:25    


 


Dohle Bodies  Not Reportable   07/12/21  04:25    


 


Pelger-Huet Anomaly  Not Reportable   07/12/21  04:25    


 


Curt Rods  Not Reportable   07/12/21  04:25    


 


Platelet Estimate  Consistent w auto   07/12/21  04:25    


 


Clumped Platelets  Not Reportable   07/12/21  04:25    


 


Plt Clumps, EDTA  Not Reportable   07/12/21  04:25    


 


Large Platelets  Not Reportable   07/12/21  04:25    


 


Giant Platelets  Not Reportable   07/12/21  04:25    


 


Platelet Satelliting  Not Reportable   07/12/21  04:25    


 


Plt Morphology Comment  Not Reportable   07/12/21  04:25    


 


RBC Morphology  Not Reportable   07/12/21  04:25    


 


Dimorphic RBCs  Not Reportable   07/12/21  04:25    


 


Polychromasia  Few   07/12/21  04:25    


 


Hypochromasia  2+   07/12/21  04:25    


 


Poikilocytosis  Not Reportable   07/12/21  04:25    


 


Anisocytosis  2+   07/12/21  04:25    


 


Microcytosis  Not Reportable   07/12/21  04:25    


 


Macrocytosis  Not Reportable   07/12/21  04:25    


 


Spherocytes  Not Reportable   07/12/21  04:25    


 


Pappenheimer Bodies  Not Reportable   07/12/21  04:25    


 


Sickle Cells  Not Reportable   07/12/21  04:25    


 


Target Cells  Not Reportable   07/12/21  04:25    


 


Tear Drop Cells  Not Reportable   07/12/21  04:25    


 


Ovalocytes  Not Reportable   07/12/21  04:25    


 


Helmet Cells  Not Reportable   07/12/21  04:25    


 


Negrete-Rifton Bodies  Not Reportable   07/12/21  04:25    


 


Cabot Rings  Not Reportable   07/12/21  04:25    


 


Michelet Cells  Not Reportable   07/12/21  04:25    


 


Bite Cells  Not Reportable   07/12/21  04:25    


 


Crenated Cell  Not Reportable   07/12/21  04:25    


 


Elliptocytes  Not Reportable   07/12/21  04:25    


 


Acanthocytes (Spur)  Not Reportable   07/12/21  04:25    


 


Rouleaux  Not Reportable   07/12/21  04:25    


 


Hemoglobin C Crystals  Not Reportable   07/12/21  04:25    


 


Schistocytes  1+   07/12/21  04:25    


 


Malaria parasites  Not Reportable   07/12/21  04:25    


 


Bryan Bodies  Not Reportable   07/12/21  04:25    


 


Hem Pathologist Commnt  No   07/12/21  04:25    


 


PT  18.4 Sec. (12.2-14.9)  H  07/13/21  19:20    


 


INR  1.48  (0.87-1.13)  H  07/13/21  19:20    


 


APTT  33.8 Sec. (24.2-36.6)   07/13/21  19:20    


 


ABG pH  7.384 pH Units (7.350-7.450)   07/11/21  14:40    


 


ABG pCO2  32.3 mm Hg  07/11/21  14:40    


 


ABG pO2  107.8 mm Hg (80.0-90.0)  H  07/11/21  14:40    


 


ABG HCO3  18.8 mmol/L (20.0-26.0)  L  07/11/21  14:40    


 


ABG O2 Saturation  97.9 % (95.0-99.0)   07/11/21  14:40    


 


ABG O2 Content  10.3  (0.0-44)   07/11/21  14:40    


 


ABG Base Excess  -5.6 mmol/L (-2.0-3.0)  L  07/11/21  14:40    


 


ABG Hemoglobin  7.4 gm/dl (14.0-18.0)  L  07/11/21  14:40    


 


ABG Carboxyhemoglobin  1.5 % (0.0-5.0)   07/11/21  14:40    


 


ABG Methemoglobin  0.5 % (0.0-1.5)   07/11/21  14:40    


 


Oxyhemoglobin  95.9 % (95.0-99.0)   07/11/21  14:40    


 


FiO2  28 %  07/11/21  14:40    


 


Sodium  133 mmol/L (137-145)  L  07/20/21  04:46    


 


Potassium  4.7 mmol/L (3.6-5.0)  D 07/20/21  04:46    


 


Chloride  93.9 mmol/L ()  L  07/20/21  04:46    


 


Carbon Dioxide  25 mmol/L (22-30)   07/20/21  04:46    


 


Anion Gap  19 mmol/L  07/20/21  04:46    


 


BUN  69 mg/dL (9-20)  H  07/20/21  04:46    


 


Creatinine  5.9 mg/dL (0.8-1.3)  H  07/20/21  04:46    


 


Estimated GFR  9 ml/min  07/20/21  04:46    


 


BUN/Creatinine Ratio  12 %  07/20/21  04:46    


 


Glucose  106 mg/dL ()  H  07/20/21  04:46    


 


POC Glucose  106 mg/dL ()  H  07/20/21  07:27    


 


Hemoglobin A1c  5.3 % (4-6)   07/11/21  02:00    


 


Calcium  7.7 mg/dL (8.4-10.2)  L  07/20/21  04:46    


 


Phosphorus  2.60 mg/dL (2.5-4.5)   07/17/21  06:10    


 


Magnesium  1.90 mg/dL (1.7-2.3)   07/20/21  04:46    


 


Total Bilirubin  0.60 mg/dL (0.1-1.2)   07/15/21  04:31    


 


AST  96 units/L (5-40)  H  07/15/21  04:31    


 


ALT  274 units/L (7-56)  H  07/15/21  04:31    


 


Alkaline Phosphatase  92 units/L ()   07/15/21  04:31    


 


Serum Total Protein  5.4 g/dL (6.1-8.1)  L  07/14/21  04:32    


 


Total Protein  5.1 g/dL (6.3-8.2)  L  07/15/21  04:31    


 


Albumin  3.0 g/dL (3.9-5)  L  07/15/21  04:31    


 


Albumin/Globulin Ratio  1.4 %  07/15/21  04:31    


 


Alpha-1-Globulins  0.4 g/dL (0.2-0.3)  H  07/14/21  04:32    


 


Alpha-2-Globulins  0.5 g/dL (0.5-0.9)   07/14/21  04:32    


 


Beta Globulins  0.2 g/dL (0.2-0.5)   07/14/21  04:32    


 


Gamma Globulins  0.9 g/dL (0.8-1.7)   07/14/21  04:32    


 


Abnorm Protein Band 1  see below   07/14/21  04:32    


 


PEP Interpretation  see below  H  07/14/21  04:32    


 


Lipase  51 units/L (13-60)   07/10/21  22:43    


 


PTH Intact  176.3 pg/mL (15-65)  H  07/12/21  04:25    


 


Urine Color  Yellow  (Yellow)   07/10/21  Unknown


 


Urine Turbidity  Cloudy  (Clear)   07/10/21  Unknown


 


Urine pH  5.0  (5.0-7.0)   07/10/21  Unknown


 


Ur Specific Gravity  1.018  (1.003-1.030)   07/10/21  Unknown


 


Urine Protein  >500 mg/dL (Negative)   07/10/21  Unknown


 


Urine Glucose (UA)  50 mg/dL (Negative)   07/10/21  Unknown


 


Urine Ketones  Neg mg/dL (Negative)   07/10/21  Unknown


 


Urine Blood  Mod  (Negative)   07/10/21  Unknown


 


Urine Nitrite  Neg  (Negative)   07/10/21  Unknown


 


Urine Bilirubin  Neg  (Negative)   07/10/21  Unknown


 


Urine Urobilinogen  < 2.0 mg/dL (<2.0)   07/10/21  Unknown


 


Ur Leukocyte Esterase  Neg  (Negative)   07/10/21  Unknown


 


Urine WBC (Auto)  4.0 /HPF (0.0-6.0)   07/10/21  Unknown


 


Urine RBC (Auto)  10.0 /HPF (0.0-6.0)   07/10/21  Unknown


 


U Epithel Cells (Auto)  1.0 /HPF (0-13.0)   07/10/21  Unknown


 


Urine Mucus  Few /HPF  07/10/21  Unknown


 


Urine Creatinine  78.5 mg/dL (0.1-20.0)  H  07/11/21  15:40    


 


Protein/Creatinin Ratio  9.49   07/11/21  15:40    


 


Urine Sodium  76 mmol/L  07/11/21  15:40    


 


Urine Total Protein  745 mg/dL (5-11.8)  H  07/11/21  15:40    


 


STEVE Screen  Negative  (Negative)   07/14/21  04:32    


 


Proteinase 3 (PR3) Ab  <1.0 AI (<1.0)   07/14/21  04:32    


 


Myeloperoxidase Ab  <1.0 AI (<1.0)   07/14/21  04:32    


 


Glomerular Base Mem IgG  See scanned result   07/14/21  04:32    


 


Complement C3  68 mg/dL ()  L  07/14/21  04:32    


 


Complement C4  20 mg/dL (15-53)   07/14/21  04:32    


 


Coronavirus (PCR)  Negative  (Negative)   07/16/21  Unknown


 


Hepatitis A IgM Ab  Non-reactive  (NonReactive)   07/11/21  00:00    


 


Hep Bs Antigen  Non-reactive  (Negative)   07/11/21  00:00    


 


Hep B Core IgM Ab  Non-reactive  (NonReactive)   07/11/21  00:00    


 


Hepatitis C Antibody  Non-reactive  (NonReactive)   07/11/21  00:00    


 


Blood Type  O POSITIVE   07/12/21  10:53    


 


Antibody Screen  Negative   07/12/21  10:53    


 


Crossmatch  See Detail   07/12/21  10:53    











Malave/IV: 


                                        





Voiding Method                   Toilet











Active Medications





- Current Medications


Current Medications: 














Generic Name Dose Route Start Last Admin





  Trade Name Freq  PRN Reason Stop Dose Admin


 


Acetaminophen  650 mg  07/11/21 02:17 





  Acetaminophen 325 Mg Tab  PO  





  Q6H PRN  





  Pain MILD(1-3)/Fever >100.5/HA  


 


Amlodipine Besylate  10 mg  07/12/21 13:00  07/19/21 19:22





  Amlodipine 10 Mg Tab  PO   Not Given





  QDAY [facility]  


 


Aspirin  81 mg  07/12/21 10:00  07/19/21 19:22





  Aspirin Ec 81 Mg Tab  PO   Not Given





  QDAY [facility]  


 


Calcium Acetate  667 mg  07/13/21 12:00  07/19/21 19:23





  Calcium Acetate 667 Mg Cap  PO   Not Given





  TIDWM [facility]  


 


Carvedilol  12.5 mg  07/11/21 22:00  07/19/21 21:30





  Carvedilol 12.5 Mg Tab  PO   12.5 mg





  BID SOTERO   Administration


 


Dextrose  0 ml  07/11/21 02:17 





  Dextrose 50% In Water (25gm) 50 Ml Syringe  IV  





  Q30MIN PRN  





  Hypoglycemia  





  Protocol  


 


Furosemide  80 mg  07/14/21 10:00  07/19/21 19:23





  Furosemide 40 Mg Tab  PO   Not Given





  QDAY [facility]  


 


Hydralazine HCl  10 mg  07/11/21 06:13  07/11/21 10:10





  Hydralazine 20 Mg/1 Ml Inj  IV   10 mg





  Q4H PRN   Administration





  Blood Pressure  


 


Sodium Chloride  100 mls @ 999 mls/hr  07/12/21 10:00 





  Nacl 0.9%  IV  





  LIBIA PRN  





  Hypotension  


 


Insulin Human Regular  0 units  07/11/21 07:30  07/19/21 21:33





  Insulin Regular, Human 100 Units/1 Ml  SUB-Q   2 units





  ACHS SOTERO   Administration





  Protocol  


 


Isosorbide Mononitrate  30 mg  07/14/21 15:00  07/19/21 19:22





  Isosorbide Mononitrate Er 30 Mg Tab  PO   Not Given





  QDAY [facility]  


 


Magnesium Hydroxide  30 ml  07/11/21 02:17 





  Magnesium Hydroxide (Mom) Oral Liqd Udc  PO  





  Q4H PRN  





  Constipation  


 


Magnesium Oxide  400 mg  07/19/21 10:00  07/19/21 21:30





  Magnesium Oxide 400 Mg Tab  PO   400 mg





  BID SOTERO   Administration


 


Nitroglycerin  0.4 mg  07/13/21 06:00  07/20/21 07:45





  Nitroglycerin 0.4 Mg Patch 24hr  TD   Not Given





  QDAY@0600 SOTERO  


 


Ondansetron HCl  4 mg  07/11/21 02:17  07/13/21 16:38





  Ondansetron 4 Mg/2 Ml Inj  IV   4 mg





  Q8H PRN   Administration





  Nausea And Vomiting  


 


Pantoprazole Sodium  40 mg  07/17/21 16:30  07/19/21 19:22





  Pantoprazole 40 Mg Tab  PO   Not Given





  BIDAC SOTERO  


 


Sodium Chloride  10 ml  07/11/21 10:00  07/19/21 21:32





  Sodium Chloride 0.9% 10 Ml Flush Syringe  IV   10 ml





  BID SOTERO   Administration


 


Sodium Chloride  10 ml  07/11/21 02:17 





  Sodium Chloride 0.9% 10 Ml Flush Syringe  IV  





  PRN PRN  





  LINE FLUSH  














Nutrition/Malnutrition Assess





- Dietary Evaluation


Nutrition/Malnutrition Findings: 


                                        





Nutrition Notes                                            Start:  07/11/21 

12:02


Freq:                                                      Status: Active       




Protocol:                                                                       




 Document     07/16/21 11:16    (Rec: 07/16/21 11:19    [id number])


 Nutrition Notes


     Initial or Follow up                        Reassessment


     Current Diagnosis                           Acute Kidney Injury,Diabetes,


                                                 Hypertension,Heart Failure,


                                                 Hyperlipidemia


     Other Pertinent Diagnosis                   elevated LFTs, GIB, anemia


     Current Diet                                Cardiac, Consistent CHO


     Labs/Tests                                  Reviewed


     Pertinent Medications                       Reviewed


     Height                                      5 ft 5 in


     Weight                                      46.1 kg


     Ideal Body Weight (kg)                      61.81


     BMI                                         16.9


     Weight Status                               Underweight


     Subjective/Other Information                FU for intakes. Per RN pt


                                                 eating 90% of meals and 90% of


                                                 ONS.


     Percent of energy/protein needs met:        100%/100%


     Burn                                        Absent


     Trauma                                      Absent


     GI Symptoms                                 None


     Current % PO                                Good (%)


     Minimum of two criteria                     Yes


     Energy Intake (non-severe)                  <75% Estimated Energy


                                                 Requirement >7 days


     Muscle Mass                                 Mild Depletion (non-severe)


     #1


      Nutrition Diagnosis                        Malnutrition


      As Evidenced by Signs and Symptoms         pt eaing 90% of meals


      Diagnosis Progress(for reassessment        Continues


       documentation)                            


     Is patient on ventilator?                   No


     Is Patient Ambulatory and/or Out of Bed     No


     REE-(Keith-St. Jeor-confined to bed)      [phone]


     Calculation Used for Recommendations        Keith- Alex


     Additional Notes                            Protein: (1.2-1.5g/kg) 62-77g


                                                 Fluid: 1 ml/kcal or per MD


 Nutrition Intervention


     Change Diet Order:                          Continue


     Add Supplement/Snack (indicate name/kcal    Nepro BID


      /protein )                                 


     Provides kCal:                              850


     Provides Protein (gm)                       38


     Goal #1                                     Meet at least 80% of protein


                                                 and energy needs via PO and


                                                 ONS intakes


     Goal #2                                     Weight gain/maintenance


     Anticipated Discharge Needs:                Cardiac, Consistent CHO


     Follow-Up By:                               07/20/21


     Additional Comments                         FU for intakes, ONS tolerance

## 2021-07-22 LAB
ALBUMIN SERPL-MCNC: (no result) G/DL
CREAT UR-MCNC: (no result) MG/DL
GAMMA GLOB SERPL ELPH-MCNC: (no result) G/DL
IMP & REVIEW OF LAB RESULTS: (no result)
PROT/CREAT UR: (no result) MG/G{CREAT}

## 2021-11-29 ENCOUNTER — HOSPITAL ENCOUNTER (EMERGENCY)
Dept: HOSPITAL 5 - ED | Age: 81
Discharge: HOME | End: 2021-11-29
Payer: MEDICARE

## 2021-11-29 VITALS — DIASTOLIC BLOOD PRESSURE: 103 MMHG | SYSTOLIC BLOOD PRESSURE: 162 MMHG

## 2021-11-29 DIAGNOSIS — M25.551: ICD-10-CM
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
## 2021-11-29 DIAGNOSIS — S20.211A: Primary | ICD-10-CM

## 2021-11-29 DIAGNOSIS — I10: ICD-10-CM

## 2021-11-29 DIAGNOSIS — E11.9: ICD-10-CM

## 2021-11-29 DIAGNOSIS — Z87.891: ICD-10-CM
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
## 2021-11-29 DIAGNOSIS — R51.9: ICD-10-CM

## 2021-11-29 DIAGNOSIS — Z79.899: ICD-10-CM

## 2021-11-29 LAB
%HYPO/RBC NFR BLD AUTO: (no result) %
ALBUMIN SERPL-MCNC: 3.3 G/DL (ref 3.9–5)
ALT SERPL-CCNC: 14 UNITS/L (ref 7–56)
ANISOCYTOSIS BLD QL SMEAR: (no result)
APTT BLD: 41.6 SEC. (ref 24.2–36.6)
BAND NEUTROPHILS # (MANUAL): 0 K/MM3
BUN SERPL-MCNC: 59 MG/DL (ref 9–20)
BUN/CREAT SERPL: 12 %
CALCIUM SERPL-MCNC: 8.3 MG/DL (ref 8.4–10.2)
HCT VFR BLD CALC: 30.8 % (ref 35.5–45.6)
HEMOLYSIS INDEX: 3
HGB BLD-MCNC: 9.3 GM/DL (ref 11.8–15.2)
INR PPP: 1.05 (ref 0.87–1.13)
MCHC RBC AUTO-ENTMCNC: 30 % (ref 32–34)
MCV RBC AUTO: 69 FL (ref 84–94)
MYELOCYTES # (MANUAL): 0 K/MM3
OVALOCYTES BLD QL SMEAR: (no result)
PLATELET # BLD: 129 K/MM3 (ref 140–440)
POIKILOCYTOSIS BLD QL SMEAR: (no result)
PROMYELOCYTES # (MANUAL): 0 K/MM3
RBC # BLD AUTO: 4.48 M/MM3 (ref 3.65–5.03)
SCHISTOCYTES BLD QL SMEAR: (no result)
TOTAL CELLS COUNTED BLD: 100

## 2021-11-29 PROCEDURE — 36415 COLL VENOUS BLD VENIPUNCTURE: CPT

## 2021-11-29 PROCEDURE — 80053 COMPREHEN METABOLIC PANEL: CPT

## 2021-11-29 PROCEDURE — 93005 ELECTROCARDIOGRAM TRACING: CPT

## 2021-11-29 PROCEDURE — 72170 X-RAY EXAM OF PELVIS: CPT

## 2021-11-29 PROCEDURE — 83735 ASSAY OF MAGNESIUM: CPT

## 2021-11-29 PROCEDURE — 85610 PROTHROMBIN TIME: CPT

## 2021-11-29 PROCEDURE — 99284 EMERGENCY DEPT VISIT MOD MDM: CPT

## 2021-11-29 PROCEDURE — 85025 COMPLETE CBC W/AUTO DIFF WBC: CPT

## 2021-11-29 PROCEDURE — 85730 THROMBOPLASTIN TIME PARTIAL: CPT

## 2021-11-29 PROCEDURE — 85007 BL SMEAR W/DIFF WBC COUNT: CPT

## 2021-11-29 PROCEDURE — 70450 CT HEAD/BRAIN W/O DYE: CPT

## 2021-11-29 NOTE — CAT SCAN REPORT
CT HEAD WITHOUT CONTRAST



INDICATION / CLINICAL INFORMATION: syncope.



TECHNIQUE: All CT scans at this location are performed using CT dose reduction for ALARA by means of 
automated exposure control. 



COMPARISON: CT head 8/1/2019



FINDINGS:

There is no acute intra-axial hemorrhage, mass effect, or edema. Diffuse cerebral atrophy with compen
satory dilation of ventricles is unchanged. Bilateral lacunar infarcts in the basal ganglia and thala
mi. Additional bilateral periventricular white matter hypodensities are consistent with microangiopat
hic changes. Gray-white matter differentiation is preserved.



Paranasal sinuses and mastoid air cells are clear. No acute osseous abnormality.





IMPRESSION:

1. No acute intracranial abnormality. 

2. Stable findings of chronic small vessel disease as above. Exam is stable compared to 8/1/2019.



Signer Name: Jon Garza MD 

Signed: 11/29/2021 2:35 PM

Workstation Name: [email or web address]

## 2021-11-29 NOTE — XRAY REPORT
[facility] ONE VIEW



INDICATION / CLINICAL INFORMATION:

fall/pain



COMPARISON:

None available.

 

FINDINGS:



BONES / JOINT(S): No acute fracture or subluxation. No significant arthritis.

SOFT TISSUES: No significant abnormality.



ADDITIONAL FINDINGS: None.







Signer Name: Crescencio Dasilva MD 

Signed: 11/29/2021 3:26 PM

Workstation Name: [id number]

## 2021-11-29 NOTE — EMERGENCY DEPARTMENT REPORT
ED General Adult HPI





- General


Chief complaint: Fall


Stated complaint: DIZZY, HIT HEAD AFTER DIALYSIS


Time Seen by Provider: 11/29/21 13:45


Source: patient, family


Mode of arrival: Ambulatory


Limitations: Language Barrier





- History of Present Illness


Initial comments: 





Patient's daughter acts as  for the patient.  She states that her dad

went to dialysis today and due to his blood pressure they did not dialyze him.  

She states her father has no complaints.  He does not have any chest pain or 

shortness of breath.  She does report 3 days ago the patient fell and has had 

right hip, rib, shoulder pain since that time.  


-: Sudden


Location: pelvis, upper extremity


Severity scale (0 -10): 4


Quality: aching


Consistency: constant


Improves with: rest


Worsens with: movement


Associated Symptoms: denies other symptoms


Treatments Prior to Arrival: none





- Related Data


                                Home Medications











 Medication  Instructions  Recorded  Confirmed  Last Taken


 


Atorvastatin Calcium [Lipitor] 80 mg PO QDAY 07/10/21 07/10/21 Unknown








                                  Previous Rx's











 Medication  Instructions  Recorded  Last Taken  Type


 


Aspirin EC [Halfprin EC] 81 mg PO QDAY #30 07/20/21 Unknown Rx


 


Calcium Acetate [Phoslo] 667 mg PO TIDWM #30 capsule 07/20/21 Unknown Rx


 


Furosemide [Lasix TAB] 80 mg PO QDAY #30 tablet 07/20/21 Unknown Rx


 


ISOSORBIDE MONOnitrate [Imdur ER] 30 mg PO QDAY #30 tablet 07/20/21 Unknown Rx


 


Pantoprazole [Protonix TAB] 40 mg PO BIDAC #60 tablet 07/20/21 Unknown Rx


 


amLODIPine 10 mg PO QDAY #30 tablet 07/20/21 Unknown Rx


 


carvediloL [Coreg] 12.5 mg PO BID #60 tablet 07/20/21 Unknown Rx











                                    Allergies











Allergy/AdvReac Type Severity Reaction Status Date / Time


 


No Known Allergies Allergy   Verified 11/29/21 13:29














ED Review of Systems


ROS: 


Stated complaint: DIZZY, HIT HEAD AFTER DIALYSIS


Other details as noted in HPI





Comment: All other systems reviewed and negative (Was obtained by  

who is the patient's daughter)


Constitutional: denies: chills, fever


Eyes: denies: eye pain, eye discharge, vision change


ENT: denies: ear pain, throat pain


Respiratory: denies: cough, shortness of breath, wheezing


Cardiovascular: denies: chest pain, palpitations


Endocrine: no symptoms reported


Gastrointestinal: denies: abdominal pain, nausea, diarrhea


Genitourinary: denies: urgency, dysuria


Musculoskeletal: denies: back pain, joint swelling, arthralgia


Skin: denies: rash, lesions


Neurological: denies: headache, weakness, paresthesias


Psychiatric: denies: anxiety, depression


Hematological/Lymphatic: denies: easy bleeding, easy bruising





ED Past Medical Hx





- Past Medical History


Hx Hypertension: Yes


Hx Diabetes: Yes


Additional medical history: Aortic dissection





- Surgical History


Additional Surgical History: Aortic dissection repair





- Social History


Smoking Status: Former Smoker


Substance Use Type: None





- Medications


Home Medications: 


                                Home Medications











 Medication  Instructions  Recorded  Confirmed  Last Taken  Type


 


Atorvastatin Calcium [Lipitor] 80 mg PO QDAY 07/10/21 07/10/21 Unknown History


 


Aspirin EC [Halfprin EC] 81 mg PO QDAY #30 07/20/21  Unknown Rx


 


Calcium Acetate [Phoslo] 667 mg PO TIDWM #30 capsule 07/20/21  Unknown Rx


 


Furosemide [Lasix TAB] 80 mg PO QDAY #30 tablet 07/20/21  Unknown Rx


 


ISOSORBIDE MONOnitrate [Imdur ER] 30 mg PO QDAY #30 tablet 07/20/21  Unknown Rx


 


Pantoprazole [Protonix TAB] 40 mg PO BIDAC #60 tablet 07/20/21  Unknown Rx


 


amLODIPine 10 mg PO QDAY #30 tablet 07/20/21  Unknown Rx


 


carvediloL [Coreg] 12.5 mg PO BID #60 tablet 07/20/21  Unknown Rx














ED Physical Exam





- General


Limitations: Language Barrier


General appearance: alert, in no apparent distress





- Head


Head exam: Present: atraumatic, normocephalic





- Eye


Eye exam: Present: normal appearance





- ENT


ENT exam: Present: mucous membranes moist





- Neck


Neck exam: Present: normal inspection





- Respiratory


Respiratory exam: Present: normal lung sounds bilaterally.  Absent: respiratory 

distress





- Cardiovascular


Cardiovascular Exam: Present: regular rate, normal rhythm.  Absent: systolic 

murmur, diastolic murmur, rubs, gallop





- GI/Abdominal


GI/Abdominal exam: Present: soft, normal bowel sounds.  Absent: distended, 

tenderness





- Rectal


Rectal exam: Present: deferred





- Extremities Exam


Extremities exam: Present: tenderness (Tenderness palpation to the humeral neck 

right arm, tenderness palpation of ribs 2 through 6 right side, tenderness 

palpation right hip)





- Back Exam


Back exam: Present: normal inspection





- Neurological Exam


Neurological exam: Present: alert, oriented X3





- Psychiatric


Psychiatric exam: Present: normal affect, normal mood





- Skin


Skin exam: Present: warm, dry, intact, normal color.  Absent: rash





ED Course





                                   Vital Signs











  11/29/21





  13:31


 


Temperature 97.5 F L


 


Pulse Rate 76


 


Respiratory 16





Rate 


 


Blood Pressure 162/103


 


O2 Sat by Pulse 98





Oximetry 














ED Medical Decision Making





- Lab Data


Result diagrams: 


                                 11/29/21 14:10





                                 11/29/21 14:10








                                   Lab Results











  11/29/21 11/29/21 11/29/21 Range/Units





  14:10 14:10 14:10 


 


WBC  5.6    (4.5-11.0)  K/mm3


 


RBC  4.48    (3.65-5.03)  M/mm3


 


Hgb  9.3 L    (11.8-15.2)  gm/dl


 


Hct  30.8 L    (35.5-45.6)  %


 


MCV  69 L    (84-94)  fl


 


MCH  21 L    (28-32)  pg


 


MCHC  30 L    (32-34)  %


 


RDW  19.6 H    (13.2-15.2)  %


 


Plt Count  129 L    (140-440)  K/mm3


 


Add Manual Diff  Complete    


 


Total Counted  100    


 


Seg Neuts % (Manual)  70.0    (40.0-70.0)  %


 


Lymphocytes % (Manual)  17.0    (13.4-35.0)  %


 


Monocytes % (Manual)  3.0    (0.0-7.3)  %


 


Eosinophils % (Manual)  6.0 H    (0.0-4.3)  %


 


Basophils % (Manual)  4.0 H    (0.0-1.8)  %


 


Nucleated RBC %  Not Reportable    


 


Seg Neutrophils # Man  3.9    (1.8-7.7)  K/mm3


 


Band Neutrophils #  0.0    K/mm3


 


Lymphocytes # (Manual)  1.0 L    (1.2-5.4)  K/mm3


 


Abs React Lymphs (Man)  0.0    K/mm3


 


Monocytes # (Manual)  0.2    (0.0-0.8)  K/mm3


 


Eosinophils # (Manual)  0.3    (0.0-0.4)  K/mm3


 


Basophils # (Manual)  0.2 H    (0.0-0.1)  K/mm3


 


Metamyelocytes #  0.0    K/mm3


 


Myelocytes #  0.0    K/mm3


 


Promyelocytes #  0.0    K/mm3


 


Blast Cells #  0.0    K/mm3


 


WBC Morphology  Not Reportable    


 


Hypersegmented Neuts  Not Reportable    


 


Hyposegmented Neuts  Not Reportable    


 


Hypogranular Neuts  Not Reportable    


 


Smudge Cells  Not Reportable    


 


Toxic Granulation  Not Reportable    


 


Toxic Vacuolation  Not Reportable    


 


Dohle Bodies  Not Reportable    


 


Pelger-Huet Anomaly  Not Reportable    


 


Curt Rods  Not Reportable    


 


Platelet Estimate  Consistent w auto    


 


Clumped Platelets  Not Reportable    


 


Plt Clumps, EDTA  Not Reportable    


 


Large Platelets  Not Reportable    


 


Giant Platelets  Not Reportable    


 


Platelet Satelliting  Not Reportable    


 


Plt Morphology Comment  Not Reportable    


 


RBC Morphology  Not Reportable    


 


Dimorphic RBCs  Not Reportable    


 


Polychromasia  Not Reportable    


 


Hypochromasia  1+    


 


Poikilocytosis  2+    


 


Anisocytosis  2+    


 


Microcytosis  2+    


 


Macrocytosis  Not Reportable    


 


Spherocytes  Not Reportable    


 


Pappenheimer Bodies  Not Reportable    


 


Sickle Cells  Not Reportable    


 


Target Cells  Few    


 


Tear Drop Cells  Few    


 


Ovalocytes  1+    


 


Helmet Cells  Not Reportable    


 


Negrete-Renwick Bodies  Not Reportable    


 


Cabot Rings  Not Reportable    


 


Wana Cells  Not Reportable    


 


Bite Cells  Not Reportable    


 


Crenated Cell  Not Reportable    


 


Elliptocytes  Not Reportable    


 


Acanthocytes (Spur)  1+    


 


Rouleaux  Not Reportable    


 


Hemoglobin C Crystals  Not Reportable    


 


Schistocytes  1+    


 


Malaria parasites  Not Reportable    


 


Brayn Bodies  Not Reportable    


 


Hem Pathologist Commnt  No    


 


PT   14.8   (12.2-14.9)  Sec.


 


INR   1.05   (0.87-1.13)  


 


APTT   41.6 H   (24.2-36.6)  Sec.


 


Sodium    140  (137-145)  mmol/L


 


Potassium    4.0  (3.6-5.0)  mmol/L


 


Chloride    102.0  ()  mmol/L


 


Carbon Dioxide    24  (22-30)  mmol/L


 


Anion Gap    18  mmol/L


 


BUN    59 H  (9-20)  mg/dL


 


Creatinine    4.9 H  (0.8-1.3)  mg/dL


 


Estimated GFR    11  ml/min


 


BUN/Creatinine Ratio    12  %


 


Glucose    118 H  ()  mg/dL


 


Calcium    8.3 L  (8.4-10.2)  mg/dL


 


Magnesium    1.70  (1.7-2.3)  mg/dL


 


Total Bilirubin    0.90  (0.1-1.2)  mg/dL


 


AST    22  (5-40)  units/L


 


ALT    14  (7-56)  units/L


 


Alkaline Phosphatase    111  ()  units/L


 


Total Protein    6.4  (6.3-8.2)  g/dL


 


Albumin    3.3 L  (3.9-5)  g/dL


 


Albumin/Globulin Ratio    1.1  %














- EKG Data


-: EKG Interpreted by Me


EKG shows normal: sinus rhythm





- EKG Data


Interpretation: LVH





- Radiology Data


Radiology results: report reviewed





- Medical Decision Making





Discussed results with the patient's daughter and the need to follow-up with 

 at the dialysis center set up dialysis for tomorrow she stated 

that she understood these instructions


Critical care attestation.: 


If time is entered above; I have spent that time in minutes in the direct care 

of this critically ill patient, excluding procedure time.








ED Disposition


Clinical Impression: 


 Hypertension, Fall, Rib contusion, Hip pain





Disposition: 01 HOME / SELF CARE / HOMELESS


Is pt being admited?: No


Does the pt Need Aspirin: No


Condition: Stable


Instructions:  Hypertension (ED), Hip Pain, Contusion, Easy-to-Read, 

Hypertension, Adult


Additional Instructions: 


return if worse


Referrals: 


PRIMARY CARE,MD [Primary Care Provider] - 3-5 Days


PAIGE MORRISON MD [Staff Physician] - 3-5 Days


Time of Disposition: 17:37

## 2021-11-29 NOTE — XRAY REPORT
RIGHT SHOULDER 3 VIEWS



INDICATION / CLINICAL INFORMATION:

fall/pain



COMPARISON:

None available.

 

FINDINGS:



BONES / JOINT(S): No acute fracture or subluxation. No significant arthritis.

SOFT TISSUES: No significant abnormality.



ADDITIONAL FINDINGS: None.







Signer Name: Crescencio Dasilva MD 

Signed: 11/29/2021 3:27 PM

Workstation Name: [id number]

## 2021-11-29 NOTE — XRAY REPORT
Chest with right RIBS 4 views



HISTORY: Pain status post fall.



COMPARISON: Chest x-ray 7/15/2021.



FINDINGS: Heart size is normal status post previous median sternotomy. The aorta remains tortuous and
 ectatic. A left-sided dialysis catheter is unchanged in position. Left-sided parenchymal scarring re
debby without acute abnormality.



No bony injury.



Signer Name: Crescencio Dasilva MD 

Signed: 11/29/2021 3:29 PM

Workstation Name: [id number]

## 2021-11-30 NOTE — ELECTROCARDIOGRAPH REPORT
[facility]

                                       

Test Date:    2021               Test Time:    13:36:34

Pat Name:     LORETTA MOORE              Department:   

Patient ID:   [id number]          Room:          

Gender:       M                        Technician:   KINGSLEY

:          1940               Requested By: INÉS SEAMAN

Order Number: [id number]              Reading MD:   Toño Stafford

                                 Measurements

Intervals                              Axis          

Rate:         70                       P:            11

SD:           217                      QRS:          63

QRSD:         96                       T:            149

QT:           426                                    

QTc:          460                                    

                           Interpretive Statements

Sinus rhythm

Borderline prolonged SD interval

LVH with secondary repolarization abnormality

Compared to ECG 2021 10:38:33

ST (T wave) deviation no longer present

Prolonged QT interval no longer present

Electronically Signed On 2021 11:13:25 EST by Toño Stafford

## 2022-03-05 ENCOUNTER — HOSPITAL ENCOUNTER (EMERGENCY)
Dept: HOSPITAL 5 - ED | Age: 82
Discharge: HOME | End: 2022-03-05
Payer: MEDICARE

## 2022-03-05 VITALS — DIASTOLIC BLOOD PRESSURE: 67 MMHG | SYSTOLIC BLOOD PRESSURE: 122 MMHG

## 2022-03-05 DIAGNOSIS — T82.838A: Primary | ICD-10-CM

## 2022-03-05 DIAGNOSIS — I12.0: ICD-10-CM

## 2022-03-05 DIAGNOSIS — Z99.2: ICD-10-CM
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
## 2022-03-05 DIAGNOSIS — M79.89: ICD-10-CM

## 2022-03-05 DIAGNOSIS — E11.22: ICD-10-CM

## 2022-03-05 DIAGNOSIS — N18.6: ICD-10-CM
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
## 2022-03-05 LAB
APTT BLD: 39.8 SEC. (ref 24.2–36.6)
BASOPHILS # (AUTO): 0.1 K/MM3 (ref 0–0.1)
BASOPHILS NFR BLD AUTO: 1.1 % (ref 0–1.8)
BUN SERPL-MCNC: 24 MG/DL (ref 9–20)
BUN/CREAT SERPL: 6 %
CALCIUM SERPL-MCNC: 8.7 MG/DL (ref 8.4–10.2)
EOSINOPHIL # BLD AUTO: 0.5 K/MM3 (ref 0–0.4)
EOSINOPHIL NFR BLD AUTO: 9.1 % (ref 0–4.3)
HCT VFR BLD CALC: 29.5 % (ref 35.5–45.6)
HEMOLYSIS INDEX: 4
HGB BLD-MCNC: 9.8 GM/DL (ref 11.8–15.2)
INR PPP: 0.99 (ref 0.87–1.13)
LYMPHOCYTES # BLD AUTO: 1.1 K/MM3 (ref 1.2–5.4)
LYMPHOCYTES NFR BLD AUTO: 18.7 % (ref 13.4–35)
MCHC RBC AUTO-ENTMCNC: 33 % (ref 32–34)
MCV RBC AUTO: 66 FL (ref 84–94)
MONOCYTES # (AUTO): 0.6 K/MM3 (ref 0–0.8)
MONOCYTES % (AUTO): 9.9 % (ref 0–7.3)
PLATELET # BLD: 152 K/MM3 (ref 140–440)
RBC # BLD AUTO: 4.5 M/MM3 (ref 3.65–5.03)

## 2022-03-05 PROCEDURE — 85025 COMPLETE CBC W/AUTO DIFF WBC: CPT

## 2022-03-05 PROCEDURE — 99284 EMERGENCY DEPT VISIT MOD MDM: CPT

## 2022-03-05 PROCEDURE — 85610 PROTHROMBIN TIME: CPT

## 2022-03-05 PROCEDURE — 36415 COLL VENOUS BLD VENIPUNCTURE: CPT

## 2022-03-05 PROCEDURE — 93990 DOPPLER FLOW TESTING: CPT

## 2022-03-05 PROCEDURE — 85730 THROMBOPLASTIN TIME PARTIAL: CPT

## 2022-03-05 PROCEDURE — 82550 ASSAY OF CK (CPK): CPT

## 2022-03-05 PROCEDURE — 80048 BASIC METABOLIC PNL TOTAL CA: CPT

## 2022-03-05 NOTE — EVENT NOTE
Date: 03/05/22





Medical screening examination note:





Nephrology: Dr. Boles





Vascular surgery: [facility] vascular





81-year-old gentleman presenting with daughter, with a complaint of bleeding 

from left upper extremity fistula.  Denies physical pain.  Left upper extremity 

swollen from the elbow distal.  Has ecchymosis.  Diminished radial pulses.





Patient denies additional complaints.





Check appropriate laboratory studies, hemodialysis access duplex study, and 

upper extremity arterial study.





Detailed history and physical to be performed by myself or oncoming ER provider.

## 2022-03-05 NOTE — EVENT NOTE
Date: 03/05/22





Contacted about this patient regarding AV fistula malfunction.





Arterial ultrasound demonstrates appropriate monophasic waveforms from the left 

subclavian artery to the left brachial artery and multiphasic waveforms in the 

radial and ulnar artery.  The monophasic waveforms are compatible with a AV 

fistula.





Hemodialysis ultrasound demonstrates severely decreased flow rates compatible 

with failing AV fistula.





Patient requires outpatient fistulogram, angioplasty, possible stenting.  The 

patient has a PermCath which he can use in the interim until he is seen by his 

usual vascular physicians at [facility].

## 2022-03-05 NOTE — VASCULAR LAB REPORT
hemodialysis access



INDICATION:

left arm swelling bleeding.



TECHNIQUE:

AV fistula left arm evaluation



COMPARISON:

None available.



FINDINGS:

The left brachial cephalic AV fistula is patent without evidence of stenosis. The inflow brachial art
matheus is patent. There is scattered subcutaneous edema throughout the left upper extremity.



Inflow velocity measures 122 cm/s.



IMPRESSION:

1. Left brachiocephalic AV fistula appears intact with appropriate flow. 



Signer Name: Srinivasan Rodriguez MD 

Signed: 3/5/2022 3:08 PM

Workstation Name: [id number]

## 2022-03-05 NOTE — VASCULAR LAB REPORT
DUPLEX DOPPLER UPPER EXTREMITY ARTERIAL, LEFT



INDICATION / CLINICAL INFORMATION:

left arm swelling  dec pulses.



TECHNIQUE:

Arterial duplex examination of the left upper extremity performed using B-mode, color flow and spectr
al Doppler assessment.



FINDINGS: 



LEFT:

Axillary:  cm/sec.  Triphasic waveform.

Brachial:  cm/sec.  Triphasic waveform.

Radial: PSV 48 cm/sec.  Triphasic waveform.

Ulnar: PSV 29 cm/sec.  Triphasic waveform.





IMPRESSION:

1. No significant upper extremity peripheral artery disease.





Doppler Waveform: 

- Triphasic is normal. 

- Biphasic is abnormal if clear transition from triphasic signal along vascular tree.

- Monophasic is abnormal.







Signer Name: Srinivasan Rodriguez MD 

Signed: 3/5/2022 3:09 PM

Workstation Name: [id number]

## 2022-07-03 ENCOUNTER — HOSPITAL ENCOUNTER (EMERGENCY)
Dept: HOSPITAL 5 - ED | Age: 82
LOS: 1 days | Discharge: HOME | End: 2022-07-04
Payer: MEDICARE

## 2022-07-03 VITALS — SYSTOLIC BLOOD PRESSURE: 130 MMHG | DIASTOLIC BLOOD PRESSURE: 72 MMHG
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
## 2022-07-03 DIAGNOSIS — I10: ICD-10-CM

## 2022-07-03 DIAGNOSIS — E11.9: ICD-10-CM
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
## 2022-07-03 DIAGNOSIS — S40.022A: Primary | ICD-10-CM

## 2022-07-03 DIAGNOSIS — Z79.899: ICD-10-CM
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
## 2022-07-03 LAB
%HYPO/RBC NFR BLD AUTO: (no result) %
ANISOCYTOSIS BLD QL SMEAR: (no result)
BAND NEUTROPHILS # (MANUAL): 0 K/MM3
BUN SERPL-MCNC: 51 MG/DL (ref 9–20)
BUN/CREAT SERPL: 10 %
CALCIUM SERPL-MCNC: 9.3 MG/DL (ref 8.4–10.2)
HCT VFR BLD CALC: 26.2 % (ref 35.5–45.6)
HEMOLYSIS INDEX: 6
HGB BLD-MCNC: 8.4 GM/DL (ref 11.8–15.2)
MCHC RBC AUTO-ENTMCNC: 32 % (ref 32–34)
MCV RBC AUTO: 67 FL (ref 84–94)
MYELOCYTES # (MANUAL): 0 K/MM3
PLATELET # BLD: 128 K/MM3 (ref 140–440)
POIKILOCYTOSIS BLD QL SMEAR: (no result)
PROMYELOCYTES # (MANUAL): 0 K/MM3
RBC # BLD AUTO: 3.91 M/MM3 (ref 3.65–5.03)
TOTAL CELLS COUNTED BLD: 100

## 2022-07-03 PROCEDURE — 73206 CT ANGIO UPR EXTRM W/O&W/DYE: CPT

## 2022-07-03 PROCEDURE — 36415 COLL VENOUS BLD VENIPUNCTURE: CPT

## 2022-07-03 PROCEDURE — 99284 EMERGENCY DEPT VISIT MOD MDM: CPT

## 2022-07-03 PROCEDURE — 80048 BASIC METABOLIC PNL TOTAL CA: CPT

## 2022-07-03 PROCEDURE — 85025 COMPLETE CBC W/AUTO DIFF WBC: CPT

## 2022-07-03 PROCEDURE — 85007 BL SMEAR W/DIFF WBC COUNT: CPT

## 2022-07-03 NOTE — EMERGENCY DEPARTMENT REPORT
ED General Adult HPI





- General


Chief complaint: Extremity Injury, Upper


Stated complaint: ARM SWOLLEN/DISCOLORED


Time Seen by Provider: 07/03/22 19:29


Source: patient, family


Mode of arrival: Ambulatory


Limitations: Language Barrier





- History of Present Illness


Initial comments: 





81-year-old male patient presents for swelling, pain, and bruising of the left 

upper arm.  Patient states his symptoms began after having dialysis on 

Wednesday.  He states he also got dialysis on Friday without any difficulties.  

Patient follows with Dr. Dubon, nephrology.  No difficulty moving the arm.  


-: Sudden


Severity scale (0 -10): 8





- Related Data


                                Home Medications











 Medication  Instructions  Recorded  Confirmed  Last Taken


 


Atorvastatin Calcium [Lipitor] 80 mg PO QDAY 07/10/21 12/14/21 Unknown


 


Pantoprazole [Protonix TAB] 40 mg PO QDAY 12/14/21 12/14/21 Unknown


 


carvediloL [Coreg] 25 mg PO BID 12/14/21 12/14/21 Unknown








                                  Previous Rx's











 Medication  Instructions  Recorded  Last Taken  Type


 


Aspirin EC [Halfprin EC] 81 mg PO QDAY #30 07/20/21 Unknown Rx


 


Calcium Acetate [Phoslo] 667 mg PO TIDWM #30 capsule 07/20/21 Unknown Rx


 


Furosemide [Lasix TAB] 80 mg PO QDAY #30 tablet 07/20/21 Unknown Rx


 


ISOSORBIDE MONOnitrate [Imdur ER] 30 mg PO QDAY #30 tablet 07/20/21 Unknown Rx


 


amLODIPine 10 mg PO QDAY #30 tablet 07/20/21 Unknown Rx


 


traMADoL [Ultram 50 MG tab] 50 mg PO Q6HR PRN #12 tablet 07/03/22 Unknown Rx











                                    Allergies











Allergy/AdvReac Type Severity Reaction Status Date / Time


 


No Known Allergies Allergy   Verified 12/14/21 11:07














ED Review of Systems


ROS: 


Stated complaint: ARM SWOLLEN/DISCOLORED


Other details as noted in HPI





Constitutional: denies: chills, fever, malaise


Musculoskeletal: joint swelling


Skin: change in color


Neurological: denies: paresthesias





ED Past Medical Hx





- Past Medical History


Previous Medical History?: Yes


Hx Hypertension: Yes


Hx Diabetes: Yes


Additional medical history: Aortic dissection





- Surgical History


Past Surgical History?: Yes


Additional Surgical History: Aortic dissection repair





- Social History


Smoking Status: Never Smoker





- Medications


Home Medications: 


                                Home Medications











 Medication  Instructions  Recorded  Confirmed  Last Taken  Type


 


Atorvastatin Calcium [Lipitor] 80 mg PO QDAY 07/10/21 12/14/21 Unknown History


 


Aspirin EC [Halfprin EC] 81 mg PO QDAY #30 07/20/21 12/14/21 Unknown Rx


 


Calcium Acetate [Phoslo] 667 mg PO TIDWM #30 capsule 07/20/21 12/14/21 Unknown 

Rx


 


Furosemide [Lasix TAB] 80 mg PO QDAY #30 tablet 07/20/21 12/14/21 Unknown Rx


 


ISOSORBIDE MONOnitrate [Imdur ER] 30 mg PO QDAY #30 tablet 07/20/21 12/14/21 

Unknown Rx


 


amLODIPine 10 mg PO QDAY #30 tablet 07/20/21 12/14/21 Unknown Rx


 


Pantoprazole [Protonix TAB] 40 mg PO QDAY 12/14/21 12/14/21 Unknown History


 


carvediloL [Coreg] 25 mg PO BID 12/14/21 12/14/21 Unknown History


 


traMADoL [Ultram 50 MG tab] 50 mg PO Q6HR PRN #12 tablet 07/03/22  Unknown Rx














ED Physical Exam





- General


Limitations: Language Barrier


General appearance: alert, in no apparent distress





- Head


Head exam: Present: atraumatic, normocephalic





- Eye


Eye exam: Present: normal appearance.  Absent: scleral icterus





- Extremities Exam


Extremities exam: Present: other (Moderate amount of swelling noted to left 

upper arm with significant ecchymosis noted circumferentially; thrill is 

palpated; normal ulnar and radial pulse; patient has full range of motion of the

arm; no erythema or cellulitic changes noted)





- Neurological Exam


Neurological exam: Present: alert, oriented X3





- Psychiatric


Psychiatric exam: Present: normal affect, normal mood





- Skin


Skin exam: Present: warm, dry, intact.  Absent: rash





ED Course


                                   Vital Signs











  07/03/22





  15:37


 


Temperature 97.8 F


 


Pulse Rate 76


 


Respiratory 20





Rate 


 


Blood Pressure 130/72





[Right] 


 


O2 Sat by Pulse 98





Oximetry 














ED Medical Decision Making





- Lab Data


Result diagrams: 


                                 07/03/22 16:06





                                07/03/22 Unknown





- Radiology Data


Radiology results: report reviewed








CT angio upper extremity LT  


 


 INDICATION / CLINICAL INFORMATION: swelling and buising around fistula. 

Swelling and bruising 


started after dialysis on 627  


 


 TECHNIQUE: Axial CT images were obtained after injection of 100 cc Omni 350 IV 

contrast using CTA 


protocol. 3 plane MIP / 3D reconstructions were produced. All CT scans at this 

location are 


performed using CT dose reduction for ALARA by means of automated exposure 

control.   


 


 COMPARISON: Prior CT chest 8/1/2019  


 


 FINDINGS:  


 CTA of the left upper extremity including the upper chest was obtained.  


 


 Of note, there is a type B aortic dissection present. This has been noted on 

prior CT chest exams 


and appears grossly unchanged. The descending thoracic aorta is aneurysmal with 

a maximum transverse


diameter 4.8 cm. Again this is unchanged from chest CT from 2019.  


 


 Evaluation of the left upper extremity reveals patent arteries throughout the 

upper arm and 


forearm. The AV fistula is identified. There is no convincing evidence for 

active extravasation from


the AV fistula. However, there is extensive fullness and heterogeneity 

throughout the musculature of


the upper arm- primarily the biceps muscle- consistent with large intramuscular 

hematoma.  


 


 No significant osseous abnormality noted.  


 


 


 


 IMPRESSION:  


 1. No evidence of active extravasation within the left arm. The AV fistula 

appears intact as do the


arteries of the left upper extremity.  


 2. Prominently enlarged biceps muscle consistent with large intramuscular 

hematoma. The source of 


the hematoma is not identified on this exam.  


 3. 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 Thoracic aortic aneurysm 

appears unchanged 


compared with older CT from 2019.  





- Medical Decision Making








81-year-old male patient presents for swelling, pain, and bruising of the left 

upper arm.  Patient states his symptoms began after having dialysis on 

Wednesday.  He states he also got dialysis on Friday without any difficulties.  

Patient follows with Dr. Dubon, nephrology.  No difficulty moving the arm.  








Ultrasound is not available today.  No significant abnormalities noted on CBC or

 BMP.  Discussed patient with Dr. Niño, vascular surgery, who recommends CTA 

with contrast of the arm.  Dr. Niño states immediate dialysis is not 

necessary.





CT angio does not show any extravasation and shows large biceps intramuscular 

hematoma.  Discussed findings with Dr. Niño-states he will get patient set up

 for an office visit for Tuesday.  He again expresses that no immediate dialysis

 is necessary after IV contrast.  Patient well-appearing and stable for 

discharge home.  Advised patient to perform warm compresses to the arm.  Strict 

return precautions discussed in detail with patient and patient's sister who 

verbalized understanding


Critical care attestation.: 


If time is entered above; I have spent that time in minutes in the direct care 

of this critically ill patient, excluding procedure time.








ED Disposition


Clinical Impression: 


 Hematoma





Disposition: 01 HOME / SELF CARE / HOMELESS


Is pt being admited?: No


Condition: Stable


Prescriptions: 


traMADoL [Ultram 50 MG tab] 50 mg PO Q6HR PRN #12 tablet


 PRN Reason: Pain


Referrals: 


JULIA NIÑO MD [Staff Physician] - 07/05/22